# Patient Record
Sex: MALE | Race: WHITE | Employment: FULL TIME | URBAN - METROPOLITAN AREA
[De-identification: names, ages, dates, MRNs, and addresses within clinical notes are randomized per-mention and may not be internally consistent; named-entity substitution may affect disease eponyms.]

---

## 2019-01-19 ENCOUNTER — HOSPITAL ENCOUNTER (INPATIENT)
Age: 49
LOS: 3 days | Discharge: HOME | DRG: 661 | End: 2019-01-22
Attending: FAMILY MEDICINE | Admitting: INTERNAL MEDICINE
Payer: COMMERCIAL

## 2019-01-19 ENCOUNTER — APPOINTMENT (OUTPATIENT)
Dept: CT IMAGING | Age: 49
DRG: 661 | End: 2019-01-19
Payer: COMMERCIAL

## 2019-01-19 DIAGNOSIS — N20.0 KIDNEY STONE: Primary | ICD-10-CM

## 2019-01-19 DIAGNOSIS — N20.1 URETERAL CALCULUS, LEFT: ICD-10-CM

## 2019-01-19 PROBLEM — N17.9 AKI (ACUTE KIDNEY INJURY) (HCC): Status: ACTIVE | Noted: 2019-01-19

## 2019-01-19 PROBLEM — E78.5 HYPERLIPIDEMIA: Status: ACTIVE | Noted: 2019-01-19

## 2019-01-19 PROBLEM — N13.2 HYDRONEPHROSIS WITH URINARY OBSTRUCTION DUE TO URETERAL CALCULUS: Status: ACTIVE | Noted: 2019-01-19

## 2019-01-19 PROBLEM — I10 ESSENTIAL HYPERTENSION: Status: ACTIVE | Noted: 2019-01-19

## 2019-01-19 PROBLEM — N13.30 HYDRONEPHROSIS: Status: ACTIVE | Noted: 2019-01-19

## 2019-01-19 PROBLEM — E11.9 TYPE 2 DIABETES MELLITUS WITHOUT COMPLICATION, WITHOUT LONG-TERM CURRENT USE OF INSULIN (HCC): Status: ACTIVE | Noted: 2019-01-19

## 2019-01-19 LAB
ALBUMIN SERPL-MCNC: 3.8 G/DL (ref 3.5–5.2)
ALBUMIN/GLOBULIN RATIO: ABNORMAL (ref 1–2.5)
ALP BLD-CCNC: 80 U/L (ref 40–129)
ALT SERPL-CCNC: 14 U/L (ref 5–41)
ANION GAP SERPL CALCULATED.3IONS-SCNC: 15 MMOL/L (ref 9–17)
AST SERPL-CCNC: 10 U/L
BILIRUB SERPL-MCNC: 0.69 MG/DL (ref 0.3–1.2)
BUN BLDV-MCNC: 18 MG/DL (ref 6–20)
BUN/CREAT BLD: 11 (ref 9–20)
C-REACTIVE PROTEIN: 33.7 MG/L (ref 0–5)
CALCIUM SERPL-MCNC: 8.6 MG/DL (ref 8.6–10.4)
CHLORIDE BLD-SCNC: 101 MMOL/L (ref 98–107)
CHP ED QC CHECK: NORMAL
CO2: 24 MMOL/L (ref 20–31)
CREAT SERPL-MCNC: 1.62 MG/DL (ref 0.7–1.2)
GFR AFRICAN AMERICAN: 56 ML/MIN
GFR NON-AFRICAN AMERICAN: 46 ML/MIN
GFR SERPL CREATININE-BSD FRML MDRD: ABNORMAL ML/MIN/{1.73_M2}
GFR SERPL CREATININE-BSD FRML MDRD: ABNORMAL ML/MIN/{1.73_M2}
GLUCOSE BLD-MCNC: 168 MG/DL (ref 75–110)
GLUCOSE BLD-MCNC: 188 MG/DL (ref 70–99)
HCT VFR BLD CALC: 42.6 % (ref 41–53)
HEMOGLOBIN: 14.6 G/DL (ref 13.5–17.5)
MCH RBC QN AUTO: 28.4 PG (ref 26–34)
MCHC RBC AUTO-ENTMCNC: 34.3 G/DL (ref 31–37)
MCV RBC AUTO: 82.6 FL (ref 80–100)
NRBC AUTOMATED: NORMAL PER 100 WBC
PDW BLD-RTO: 14.1 % (ref 11.5–14.5)
PLATELET # BLD: 194 K/UL (ref 130–400)
PMV BLD AUTO: 7.6 FL (ref 6–12)
POTASSIUM SERPL-SCNC: 4.1 MMOL/L (ref 3.7–5.3)
RBC # BLD: 5.16 M/UL (ref 4.5–5.9)
SODIUM BLD-SCNC: 140 MMOL/L (ref 135–144)
TOTAL PROTEIN: 6.7 G/DL (ref 6.4–8.3)
WBC # BLD: 8.8 K/UL (ref 3.5–11)

## 2019-01-19 PROCEDURE — 6360000002 HC RX W HCPCS: Performed by: FAMILY MEDICINE

## 2019-01-19 PROCEDURE — 2580000003 HC RX 258: Performed by: INTERNAL MEDICINE

## 2019-01-19 PROCEDURE — 87086 URINE CULTURE/COLONY COUNT: CPT

## 2019-01-19 PROCEDURE — 82947 ASSAY GLUCOSE BLOOD QUANT: CPT

## 2019-01-19 PROCEDURE — 99222 1ST HOSP IP/OBS MODERATE 55: CPT | Performed by: INTERNAL MEDICINE

## 2019-01-19 PROCEDURE — 2580000003 HC RX 258: Performed by: FAMILY MEDICINE

## 2019-01-19 PROCEDURE — 1200000000 HC SEMI PRIVATE

## 2019-01-19 PROCEDURE — 99285 EMERGENCY DEPT VISIT HI MDM: CPT

## 2019-01-19 PROCEDURE — 36415 COLL VENOUS BLD VENIPUNCTURE: CPT

## 2019-01-19 PROCEDURE — 86140 C-REACTIVE PROTEIN: CPT

## 2019-01-19 PROCEDURE — 80053 COMPREHEN METABOLIC PANEL: CPT

## 2019-01-19 PROCEDURE — 85027 COMPLETE CBC AUTOMATED: CPT

## 2019-01-19 PROCEDURE — 74176 CT ABD & PELVIS W/O CONTRAST: CPT

## 2019-01-19 PROCEDURE — 6370000000 HC RX 637 (ALT 250 FOR IP): Performed by: INTERNAL MEDICINE

## 2019-01-19 PROCEDURE — 96360 HYDRATION IV INFUSION INIT: CPT

## 2019-01-19 RX ORDER — ONDANSETRON 2 MG/ML
4 INJECTION INTRAMUSCULAR; INTRAVENOUS EVERY 6 HOURS PRN
Status: DISCONTINUED | OUTPATIENT
Start: 2019-01-19 | End: 2019-01-19 | Stop reason: CLARIF

## 2019-01-19 RX ORDER — BISACODYL 10 MG
10 SUPPOSITORY, RECTAL RECTAL DAILY PRN
Status: DISCONTINUED | OUTPATIENT
Start: 2019-01-19 | End: 2019-01-22 | Stop reason: HOSPADM

## 2019-01-19 RX ORDER — POTASSIUM CHLORIDE 20 MEQ/1
40 TABLET, EXTENDED RELEASE ORAL PRN
Status: DISCONTINUED | OUTPATIENT
Start: 2019-01-19 | End: 2019-01-22 | Stop reason: HOSPADM

## 2019-01-19 RX ORDER — ONDANSETRON 4 MG/1
4 TABLET, ORALLY DISINTEGRATING ORAL EVERY 6 HOURS PRN
Status: DISCONTINUED | OUTPATIENT
Start: 2019-01-19 | End: 2019-01-22 | Stop reason: HOSPADM

## 2019-01-19 RX ORDER — SIMVASTATIN 20 MG
20 TABLET ORAL NIGHTLY
Status: DISCONTINUED | OUTPATIENT
Start: 2019-01-19 | End: 2019-01-22 | Stop reason: HOSPADM

## 2019-01-19 RX ORDER — CIPROFLOXACIN 2 MG/ML
400 INJECTION, SOLUTION INTRAVENOUS ONCE
Status: COMPLETED | OUTPATIENT
Start: 2019-01-19 | End: 2019-01-19

## 2019-01-19 RX ORDER — 0.9 % SODIUM CHLORIDE 0.9 %
1000 INTRAVENOUS SOLUTION INTRAVENOUS ONCE
Status: COMPLETED | OUTPATIENT
Start: 2019-01-19 | End: 2019-01-19

## 2019-01-19 RX ORDER — CIPROFLOXACIN 500 MG/1
500 TABLET, FILM COATED ORAL EVERY 12 HOURS SCHEDULED
Status: DISCONTINUED | OUTPATIENT
Start: 2019-01-19 | End: 2019-01-22 | Stop reason: HOSPADM

## 2019-01-19 RX ORDER — ONDANSETRON 2 MG/ML
4 INJECTION INTRAMUSCULAR; INTRAVENOUS EVERY 6 HOURS PRN
Status: DISCONTINUED | OUTPATIENT
Start: 2019-01-19 | End: 2019-01-22 | Stop reason: HOSPADM

## 2019-01-19 RX ORDER — SODIUM CHLORIDE 0.9 % (FLUSH) 0.9 %
10 SYRINGE (ML) INJECTION PRN
Status: DISCONTINUED | OUTPATIENT
Start: 2019-01-19 | End: 2019-01-22 | Stop reason: HOSPADM

## 2019-01-19 RX ORDER — NICOTINE POLACRILEX 4 MG
15 LOZENGE BUCCAL PRN
Status: DISCONTINUED | OUTPATIENT
Start: 2019-01-19 | End: 2019-01-22 | Stop reason: HOSPADM

## 2019-01-19 RX ORDER — POTASSIUM CHLORIDE 7.45 MG/ML
10 INJECTION INTRAVENOUS PRN
Status: DISCONTINUED | OUTPATIENT
Start: 2019-01-19 | End: 2019-01-22 | Stop reason: HOSPADM

## 2019-01-19 RX ORDER — MAGNESIUM SULFATE 1 G/100ML
1 INJECTION INTRAVENOUS PRN
Status: DISCONTINUED | OUTPATIENT
Start: 2019-01-19 | End: 2019-01-22 | Stop reason: HOSPADM

## 2019-01-19 RX ORDER — CARVEDILOL 6.25 MG/1
6.25 TABLET ORAL 2 TIMES DAILY WITH MEALS
Status: DISCONTINUED | OUTPATIENT
Start: 2019-01-19 | End: 2019-01-22 | Stop reason: HOSPADM

## 2019-01-19 RX ORDER — POTASSIUM CHLORIDE 20MEQ/15ML
40 LIQUID (ML) ORAL PRN
Status: DISCONTINUED | OUTPATIENT
Start: 2019-01-19 | End: 2019-01-22 | Stop reason: HOSPADM

## 2019-01-19 RX ORDER — HYDROCODONE BITARTRATE AND ACETAMINOPHEN 5; 325 MG/1; MG/1
1 TABLET ORAL EVERY 4 HOURS PRN
Status: DISCONTINUED | OUTPATIENT
Start: 2019-01-19 | End: 2019-01-22 | Stop reason: HOSPADM

## 2019-01-19 RX ORDER — DEXTROSE MONOHYDRATE 50 MG/ML
100 INJECTION, SOLUTION INTRAVENOUS PRN
Status: DISCONTINUED | OUTPATIENT
Start: 2019-01-19 | End: 2019-01-22 | Stop reason: HOSPADM

## 2019-01-19 RX ORDER — DEXTROSE MONOHYDRATE 25 G/50ML
12.5 INJECTION, SOLUTION INTRAVENOUS PRN
Status: DISCONTINUED | OUTPATIENT
Start: 2019-01-19 | End: 2019-01-22 | Stop reason: HOSPADM

## 2019-01-19 RX ORDER — SODIUM CHLORIDE 9 MG/ML
INJECTION, SOLUTION INTRAVENOUS CONTINUOUS
Status: DISCONTINUED | OUTPATIENT
Start: 2019-01-19 | End: 2019-01-22 | Stop reason: HOSPADM

## 2019-01-19 RX ORDER — SODIUM CHLORIDE 0.9 % (FLUSH) 0.9 %
10 SYRINGE (ML) INJECTION EVERY 12 HOURS SCHEDULED
Status: DISCONTINUED | OUTPATIENT
Start: 2019-01-19 | End: 2019-01-22 | Stop reason: HOSPADM

## 2019-01-19 RX ORDER — ALLOPURINOL 300 MG/1
300 TABLET ORAL DAILY
Status: DISCONTINUED | OUTPATIENT
Start: 2019-01-19 | End: 2019-01-22 | Stop reason: HOSPADM

## 2019-01-19 RX ADMIN — SODIUM CHLORIDE: 9 INJECTION, SOLUTION INTRAVENOUS at 18:18

## 2019-01-19 RX ADMIN — SODIUM CHLORIDE 1000 ML: 9 INJECTION, SOLUTION INTRAVENOUS at 14:04

## 2019-01-19 RX ADMIN — CIPROFLOXACIN 400 MG: 2 INJECTION, SOLUTION INTRAVENOUS at 15:00

## 2019-01-19 RX ADMIN — CARVEDILOL 6.25 MG: 6.25 TABLET, FILM COATED ORAL at 18:18

## 2019-01-19 RX ADMIN — ALLOPURINOL 300 MG: 300 TABLET ORAL at 18:18

## 2019-01-19 RX ADMIN — CIPROFLOXACIN 500 MG: 500 TABLET ORAL at 20:38

## 2019-01-19 ASSESSMENT — ENCOUNTER SYMPTOMS
EYES NEGATIVE: 1
ALLERGIC/IMMUNOLOGIC NEGATIVE: 1
GASTROINTESTINAL NEGATIVE: 1
RESPIRATORY NEGATIVE: 1

## 2019-01-19 ASSESSMENT — PAIN SCALES - GENERAL: PAINLEVEL_OUTOF10: 8

## 2019-01-19 ASSESSMENT — PAIN DESCRIPTION - LOCATION: LOCATION: ABDOMEN

## 2019-01-19 ASSESSMENT — PAIN DESCRIPTION - PROGRESSION: CLINICAL_PROGRESSION: NOT CHANGED

## 2019-01-19 ASSESSMENT — PAIN DESCRIPTION - DESCRIPTORS: DESCRIPTORS: ACHING;DISCOMFORT

## 2019-01-19 ASSESSMENT — PAIN DESCRIPTION - PAIN TYPE: TYPE: ACUTE PAIN

## 2019-01-19 ASSESSMENT — PAIN DESCRIPTION - FREQUENCY: FREQUENCY: CONTINUOUS

## 2019-01-19 ASSESSMENT — PAIN DESCRIPTION - ONSET: ONSET: ON-GOING

## 2019-01-19 ASSESSMENT — PAIN DESCRIPTION - ORIENTATION: ORIENTATION: LEFT;LOWER

## 2019-01-20 ENCOUNTER — ANESTHESIA (OUTPATIENT)
Dept: OPERATING ROOM | Age: 49
DRG: 661 | End: 2019-01-20
Payer: COMMERCIAL

## 2019-01-20 ENCOUNTER — APPOINTMENT (OUTPATIENT)
Dept: GENERAL RADIOLOGY | Age: 49
DRG: 661 | End: 2019-01-20
Payer: COMMERCIAL

## 2019-01-20 ENCOUNTER — ANESTHESIA EVENT (OUTPATIENT)
Dept: OPERATING ROOM | Age: 49
DRG: 661 | End: 2019-01-20
Payer: COMMERCIAL

## 2019-01-20 VITALS — OXYGEN SATURATION: 95 % | SYSTOLIC BLOOD PRESSURE: 135 MMHG | DIASTOLIC BLOOD PRESSURE: 79 MMHG

## 2019-01-20 PROBLEM — N20.1 URETERAL CALCULUS, LEFT: Status: ACTIVE | Noted: 2019-01-20

## 2019-01-20 LAB
ANION GAP SERPL CALCULATED.3IONS-SCNC: 14 MMOL/L (ref 9–17)
BUN BLDV-MCNC: 25 MG/DL (ref 6–20)
BUN/CREAT BLD: 7 (ref 9–20)
CALCIUM SERPL-MCNC: 8.3 MG/DL (ref 8.6–10.4)
CHLORIDE BLD-SCNC: 102 MMOL/L (ref 98–107)
CO2: 24 MMOL/L (ref 20–31)
CREAT SERPL-MCNC: 3.57 MG/DL (ref 0.7–1.2)
CULTURE: NO GROWTH
GFR AFRICAN AMERICAN: 22 ML/MIN
GFR NON-AFRICAN AMERICAN: 18 ML/MIN
GFR SERPL CREATININE-BSD FRML MDRD: ABNORMAL ML/MIN/{1.73_M2}
GFR SERPL CREATININE-BSD FRML MDRD: ABNORMAL ML/MIN/{1.73_M2}
GLUCOSE BLD-MCNC: 163 MG/DL (ref 75–110)
GLUCOSE BLD-MCNC: 175 MG/DL (ref 75–110)
GLUCOSE BLD-MCNC: 182 MG/DL (ref 70–99)
GLUCOSE BLD-MCNC: 195 MG/DL (ref 75–110)
GLUCOSE BLD-MCNC: 201 MG/DL (ref 75–110)
GLUCOSE BLD-MCNC: 208 MG/DL (ref 75–110)
HCT VFR BLD CALC: 39.8 % (ref 41–53)
HEMOGLOBIN: 13.5 G/DL (ref 13.5–17.5)
INR BLD: 1.1
Lab: NORMAL
MCH RBC QN AUTO: 28.3 PG (ref 26–34)
MCHC RBC AUTO-ENTMCNC: 33.9 G/DL (ref 31–37)
MCV RBC AUTO: 83.6 FL (ref 80–100)
NRBC AUTOMATED: ABNORMAL PER 100 WBC
PDW BLD-RTO: 14.4 % (ref 11.5–14.5)
PLATELET # BLD: 179 K/UL (ref 130–400)
PMV BLD AUTO: 7.4 FL (ref 6–12)
POTASSIUM SERPL-SCNC: 4.2 MMOL/L (ref 3.7–5.3)
PROTHROMBIN TIME: 11 SEC (ref 9.7–11.6)
RBC # BLD: 4.77 M/UL (ref 4.5–5.9)
SODIUM BLD-SCNC: 140 MMOL/L (ref 135–144)
SPECIMEN DESCRIPTION: NORMAL
STATUS: NORMAL
WBC # BLD: 8.7 K/UL (ref 3.5–11)

## 2019-01-20 PROCEDURE — 99232 SBSQ HOSP IP/OBS MODERATE 35: CPT | Performed by: INTERNAL MEDICINE

## 2019-01-20 PROCEDURE — 3600000012 HC SURGERY LEVEL 2 ADDTL 15MIN: Performed by: UROLOGY

## 2019-01-20 PROCEDURE — C1769 GUIDE WIRE: HCPCS | Performed by: UROLOGY

## 2019-01-20 PROCEDURE — C2617 STENT, NON-COR, TEM W/O DEL: HCPCS | Performed by: UROLOGY

## 2019-01-20 PROCEDURE — 85027 COMPLETE CBC AUTOMATED: CPT

## 2019-01-20 PROCEDURE — 82947 ASSAY GLUCOSE BLOOD QUANT: CPT

## 2019-01-20 PROCEDURE — 0T778DZ DILATION OF LEFT URETER WITH INTRALUMINAL DEVICE, VIA NATURAL OR ARTIFICIAL OPENING ENDOSCOPIC: ICD-10-PCS | Performed by: UROLOGY

## 2019-01-20 PROCEDURE — 74420 UROGRAPHY RTRGR +-KUB: CPT

## 2019-01-20 PROCEDURE — 7100000001 HC PACU RECOVERY - ADDTL 15 MIN: Performed by: UROLOGY

## 2019-01-20 PROCEDURE — 2709999900 HC NON-CHARGEABLE SUPPLY: Performed by: UROLOGY

## 2019-01-20 PROCEDURE — 80048 BASIC METABOLIC PNL TOTAL CA: CPT

## 2019-01-20 PROCEDURE — 1200000000 HC SEMI PRIVATE

## 2019-01-20 PROCEDURE — 3700000001 HC ADD 15 MINUTES (ANESTHESIA): Performed by: UROLOGY

## 2019-01-20 PROCEDURE — BT1FZZZ FLUOROSCOPY OF LEFT KIDNEY, URETER AND BLADDER: ICD-10-PCS | Performed by: UROLOGY

## 2019-01-20 PROCEDURE — 3600000002 HC SURGERY LEVEL 2 BASE: Performed by: UROLOGY

## 2019-01-20 PROCEDURE — 6370000000 HC RX 637 (ALT 250 FOR IP): Performed by: INTERNAL MEDICINE

## 2019-01-20 PROCEDURE — 3700000000 HC ANESTHESIA ATTENDED CARE: Performed by: UROLOGY

## 2019-01-20 PROCEDURE — 6360000004 HC RX CONTRAST MEDICATION: Performed by: UROLOGY

## 2019-01-20 PROCEDURE — 6360000002 HC RX W HCPCS: Performed by: ANESTHESIOLOGY

## 2019-01-20 PROCEDURE — 6360000002 HC RX W HCPCS: Performed by: INTERNAL MEDICINE

## 2019-01-20 PROCEDURE — 36415 COLL VENOUS BLD VENIPUNCTURE: CPT

## 2019-01-20 PROCEDURE — 2580000003 HC RX 258: Performed by: INTERNAL MEDICINE

## 2019-01-20 PROCEDURE — 3209999900 FLUORO FOR SURGICAL PROCEDURES

## 2019-01-20 PROCEDURE — 85610 PROTHROMBIN TIME: CPT

## 2019-01-20 PROCEDURE — 2500000003 HC RX 250 WO HCPCS: Performed by: ANESTHESIOLOGY

## 2019-01-20 PROCEDURE — C1758 CATHETER, URETERAL: HCPCS | Performed by: UROLOGY

## 2019-01-20 PROCEDURE — 7100000000 HC PACU RECOVERY - FIRST 15 MIN: Performed by: UROLOGY

## 2019-01-20 DEVICE — URETERAL STENT
Type: IMPLANTABLE DEVICE | Site: URETER | Status: FUNCTIONAL
Brand: POLARIS™ ULTRA

## 2019-01-20 RX ORDER — FENTANYL CITRATE 50 UG/ML
50 INJECTION, SOLUTION INTRAMUSCULAR; INTRAVENOUS EVERY 5 MIN PRN
Status: DISCONTINUED | OUTPATIENT
Start: 2019-01-20 | End: 2019-01-20 | Stop reason: HOSPADM

## 2019-01-20 RX ORDER — PROPOFOL 10 MG/ML
INJECTION, EMULSION INTRAVENOUS PRN
Status: DISCONTINUED | OUTPATIENT
Start: 2019-01-20 | End: 2019-01-20 | Stop reason: SDUPTHER

## 2019-01-20 RX ORDER — ONDANSETRON 2 MG/ML
INJECTION INTRAMUSCULAR; INTRAVENOUS PRN
Status: DISCONTINUED | OUTPATIENT
Start: 2019-01-20 | End: 2019-01-20 | Stop reason: SDUPTHER

## 2019-01-20 RX ORDER — ONDANSETRON 2 MG/ML
4 INJECTION INTRAMUSCULAR; INTRAVENOUS
Status: DISCONTINUED | OUTPATIENT
Start: 2019-01-20 | End: 2019-01-20 | Stop reason: HOSPADM

## 2019-01-20 RX ORDER — SUCCINYLCHOLINE CHLORIDE 20 MG/ML
INJECTION INTRAMUSCULAR; INTRAVENOUS PRN
Status: DISCONTINUED | OUTPATIENT
Start: 2019-01-20 | End: 2019-01-20 | Stop reason: SDUPTHER

## 2019-01-20 RX ORDER — LIDOCAINE HYDROCHLORIDE 20 MG/ML
INJECTION, SOLUTION INFILTRATION; PERINEURAL PRN
Status: DISCONTINUED | OUTPATIENT
Start: 2019-01-20 | End: 2019-01-20 | Stop reason: SDUPTHER

## 2019-01-20 RX ORDER — FENTANYL CITRATE 50 UG/ML
INJECTION, SOLUTION INTRAMUSCULAR; INTRAVENOUS PRN
Status: DISCONTINUED | OUTPATIENT
Start: 2019-01-20 | End: 2019-01-20 | Stop reason: SDUPTHER

## 2019-01-20 RX ORDER — FENTANYL CITRATE 50 UG/ML
25 INJECTION, SOLUTION INTRAMUSCULAR; INTRAVENOUS EVERY 5 MIN PRN
Status: DISCONTINUED | OUTPATIENT
Start: 2019-01-20 | End: 2019-01-20 | Stop reason: HOSPADM

## 2019-01-20 RX ADMIN — PROPOFOL 100 MG: 10 INJECTION, EMULSION INTRAVENOUS at 10:24

## 2019-01-20 RX ADMIN — CIPROFLOXACIN 500 MG: 500 TABLET ORAL at 21:20

## 2019-01-20 RX ADMIN — SODIUM CHLORIDE: 9 INJECTION, SOLUTION INTRAVENOUS at 15:41

## 2019-01-20 RX ADMIN — INSULIN LISPRO 4 UNITS: 100 INJECTION, SOLUTION INTRAVENOUS; SUBCUTANEOUS at 17:22

## 2019-01-20 RX ADMIN — ONDANSETRON 4 MG: 2 INJECTION, SOLUTION INTRAMUSCULAR; INTRAVENOUS at 10:16

## 2019-01-20 RX ADMIN — PROPOFOL 200 MG: 10 INJECTION, EMULSION INTRAVENOUS at 10:11

## 2019-01-20 RX ADMIN — ONDANSETRON 4 MG: 2 INJECTION INTRAMUSCULAR; INTRAVENOUS at 07:41

## 2019-01-20 RX ADMIN — SUCCINYLCHOLINE CHLORIDE 100 MG: 20 INJECTION, SOLUTION INTRAMUSCULAR; INTRAVENOUS at 10:11

## 2019-01-20 RX ADMIN — INSULIN LISPRO 2 UNITS: 100 INJECTION, SOLUTION INTRAVENOUS; SUBCUTANEOUS at 21:27

## 2019-01-20 RX ADMIN — CARVEDILOL 6.25 MG: 6.25 TABLET, FILM COATED ORAL at 15:41

## 2019-01-20 RX ADMIN — FENTANYL CITRATE 100 MCG: 50 INJECTION, SOLUTION INTRAMUSCULAR; INTRAVENOUS at 10:11

## 2019-01-20 RX ADMIN — CIPROFLOXACIN 500 MG: 500 TABLET ORAL at 07:58

## 2019-01-20 RX ADMIN — LIDOCAINE HYDROCHLORIDE 5 ML: 20 INJECTION, SOLUTION INFILTRATION; PERINEURAL at 10:11

## 2019-01-20 RX ADMIN — SIMVASTATIN 20 MG: 20 TABLET, FILM COATED ORAL at 21:20

## 2019-01-20 ASSESSMENT — PULMONARY FUNCTION TESTS
PIF_VALUE: 36
PIF_VALUE: 0
PIF_VALUE: 26
PIF_VALUE: 25
PIF_VALUE: 35
PIF_VALUE: 0
PIF_VALUE: 29
PIF_VALUE: 0
PIF_VALUE: 29
PIF_VALUE: 2
PIF_VALUE: 36
PIF_VALUE: 40
PIF_VALUE: 1
PIF_VALUE: 27
PIF_VALUE: 25
PIF_VALUE: -1
PIF_VALUE: 27
PIF_VALUE: 40
PIF_VALUE: 26
PIF_VALUE: 27
PIF_VALUE: 2
PIF_VALUE: 25
PIF_VALUE: 27
PIF_VALUE: 0
PIF_VALUE: 37
PIF_VALUE: 26
PIF_VALUE: 26
PIF_VALUE: 1
PIF_VALUE: 25
PIF_VALUE: -2

## 2019-01-20 ASSESSMENT — PAIN DESCRIPTION - PAIN TYPE: TYPE: SURGICAL PAIN

## 2019-01-20 ASSESSMENT — PAIN DESCRIPTION - DESCRIPTORS: DESCRIPTORS: ACHING;DISCOMFORT

## 2019-01-20 ASSESSMENT — PAIN DESCRIPTION - LOCATION: LOCATION: ABDOMEN

## 2019-01-20 ASSESSMENT — PAIN DESCRIPTION - PROGRESSION: CLINICAL_PROGRESSION: NOT CHANGED

## 2019-01-20 ASSESSMENT — PAIN DESCRIPTION - FREQUENCY: FREQUENCY: CONTINUOUS

## 2019-01-20 ASSESSMENT — PAIN DESCRIPTION - ONSET: ONSET: ON-GOING

## 2019-01-20 ASSESSMENT — PAIN SCALES - GENERAL
PAINLEVEL_OUTOF10: 0
PAINLEVEL_OUTOF10: 0
PAINLEVEL_OUTOF10: 1
PAINLEVEL_OUTOF10: 0
PAINLEVEL_OUTOF10: 0

## 2019-01-20 ASSESSMENT — PAIN DESCRIPTION - ORIENTATION: ORIENTATION: LEFT;LOWER

## 2019-01-21 LAB
ANION GAP SERPL CALCULATED.3IONS-SCNC: 13 MMOL/L (ref 9–17)
BUN BLDV-MCNC: 22 MG/DL (ref 6–20)
BUN/CREAT BLD: 9 (ref 9–20)
CALCIUM SERPL-MCNC: 8.7 MG/DL (ref 8.6–10.4)
CHLORIDE BLD-SCNC: 106 MMOL/L (ref 98–107)
CO2: 25 MMOL/L (ref 20–31)
CREAT SERPL-MCNC: 2.35 MG/DL (ref 0.7–1.2)
GFR AFRICAN AMERICAN: 36 ML/MIN
GFR NON-AFRICAN AMERICAN: 30 ML/MIN
GFR SERPL CREATININE-BSD FRML MDRD: ABNORMAL ML/MIN/{1.73_M2}
GFR SERPL CREATININE-BSD FRML MDRD: ABNORMAL ML/MIN/{1.73_M2}
GLUCOSE BLD-MCNC: 168 MG/DL (ref 75–110)
GLUCOSE BLD-MCNC: 174 MG/DL (ref 75–110)
GLUCOSE BLD-MCNC: 177 MG/DL (ref 70–99)
GLUCOSE BLD-MCNC: 200 MG/DL (ref 75–110)
GLUCOSE BLD-MCNC: 220 MG/DL (ref 75–110)
POTASSIUM SERPL-SCNC: 4.2 MMOL/L (ref 3.7–5.3)
SODIUM BLD-SCNC: 144 MMOL/L (ref 135–144)

## 2019-01-21 PROCEDURE — 6370000000 HC RX 637 (ALT 250 FOR IP): Performed by: INTERNAL MEDICINE

## 2019-01-21 PROCEDURE — 83036 HEMOGLOBIN GLYCOSYLATED A1C: CPT

## 2019-01-21 PROCEDURE — 36415 COLL VENOUS BLD VENIPUNCTURE: CPT

## 2019-01-21 PROCEDURE — 99232 SBSQ HOSP IP/OBS MODERATE 35: CPT | Performed by: INTERNAL MEDICINE

## 2019-01-21 PROCEDURE — 80048 BASIC METABOLIC PNL TOTAL CA: CPT

## 2019-01-21 PROCEDURE — 1200000000 HC SEMI PRIVATE

## 2019-01-21 PROCEDURE — 82947 ASSAY GLUCOSE BLOOD QUANT: CPT

## 2019-01-21 PROCEDURE — 2580000003 HC RX 258: Performed by: INTERNAL MEDICINE

## 2019-01-21 RX ORDER — GLIPIZIDE 10 MG/1
10 TABLET, FILM COATED, EXTENDED RELEASE ORAL 2 TIMES DAILY
COMMUNITY

## 2019-01-21 RX ADMIN — CARVEDILOL 6.25 MG: 6.25 TABLET, FILM COATED ORAL at 08:44

## 2019-01-21 RX ADMIN — CIPROFLOXACIN 500 MG: 500 TABLET ORAL at 08:44

## 2019-01-21 RX ADMIN — INSULIN LISPRO 3 UNITS: 100 INJECTION, SOLUTION INTRAVENOUS; SUBCUTANEOUS at 22:17

## 2019-01-21 RX ADMIN — SIMVASTATIN 20 MG: 20 TABLET, FILM COATED ORAL at 22:17

## 2019-01-21 RX ADMIN — CARVEDILOL 6.25 MG: 6.25 TABLET, FILM COATED ORAL at 17:03

## 2019-01-21 RX ADMIN — INSULIN LISPRO 3 UNITS: 100 INJECTION, SOLUTION INTRAVENOUS; SUBCUTANEOUS at 17:02

## 2019-01-21 RX ADMIN — ALLOPURINOL 300 MG: 300 TABLET ORAL at 08:44

## 2019-01-21 RX ADMIN — SODIUM CHLORIDE: 9 INJECTION, SOLUTION INTRAVENOUS at 16:26

## 2019-01-21 RX ADMIN — INSULIN LISPRO 2 UNITS: 100 INJECTION, SOLUTION INTRAVENOUS; SUBCUTANEOUS at 08:45

## 2019-01-21 RX ADMIN — CIPROFLOXACIN 500 MG: 500 TABLET ORAL at 22:17

## 2019-01-21 RX ADMIN — INSULIN LISPRO 4 UNITS: 100 INJECTION, SOLUTION INTRAVENOUS; SUBCUTANEOUS at 11:42

## 2019-01-21 ASSESSMENT — PAIN DESCRIPTION - PROGRESSION

## 2019-01-21 ASSESSMENT — PAIN SCALES - GENERAL: PAINLEVEL_OUTOF10: 1

## 2019-01-21 ASSESSMENT — PAIN DESCRIPTION - ONSET: ONSET: ON-GOING

## 2019-01-21 ASSESSMENT — PAIN DESCRIPTION - DESCRIPTORS: DESCRIPTORS: ACHING;DISCOMFORT

## 2019-01-21 ASSESSMENT — PAIN DESCRIPTION - PAIN TYPE: TYPE: SURGICAL PAIN

## 2019-01-21 ASSESSMENT — PAIN DESCRIPTION - FREQUENCY: FREQUENCY: CONTINUOUS

## 2019-01-21 ASSESSMENT — PAIN DESCRIPTION - LOCATION: LOCATION: ABDOMEN

## 2019-01-21 ASSESSMENT — PAIN DESCRIPTION - ORIENTATION: ORIENTATION: LEFT;LOWER

## 2019-01-22 ENCOUNTER — APPOINTMENT (OUTPATIENT)
Dept: GENERAL RADIOLOGY | Age: 49
DRG: 661 | End: 2019-01-22
Payer: COMMERCIAL

## 2019-01-22 VITALS
BODY MASS INDEX: 38.04 KG/M2 | HEIGHT: 70 IN | RESPIRATION RATE: 16 BRPM | SYSTOLIC BLOOD PRESSURE: 158 MMHG | HEART RATE: 78 BPM | TEMPERATURE: 97.5 F | WEIGHT: 265.7 LBS | OXYGEN SATURATION: 91 % | DIASTOLIC BLOOD PRESSURE: 84 MMHG

## 2019-01-22 LAB
ANION GAP SERPL CALCULATED.3IONS-SCNC: 15 MMOL/L (ref 9–17)
BUN BLDV-MCNC: 16 MG/DL (ref 6–20)
BUN/CREAT BLD: 10 (ref 9–20)
CALCIUM SERPL-MCNC: 8.7 MG/DL (ref 8.6–10.4)
CHLORIDE BLD-SCNC: 102 MMOL/L (ref 98–107)
CO2: 25 MMOL/L (ref 20–31)
CREAT SERPL-MCNC: 1.54 MG/DL (ref 0.7–1.2)
ESTIMATED AVERAGE GLUCOSE: 214 MG/DL
GFR AFRICAN AMERICAN: 59 ML/MIN
GFR NON-AFRICAN AMERICAN: 49 ML/MIN
GFR SERPL CREATININE-BSD FRML MDRD: ABNORMAL ML/MIN/{1.73_M2}
GFR SERPL CREATININE-BSD FRML MDRD: ABNORMAL ML/MIN/{1.73_M2}
GLUCOSE BLD-MCNC: 172 MG/DL (ref 75–110)
GLUCOSE BLD-MCNC: 182 MG/DL (ref 75–110)
GLUCOSE BLD-MCNC: 184 MG/DL (ref 70–99)
GLUCOSE BLD-MCNC: 240 MG/DL (ref 75–110)
HBA1C MFR BLD: 9.1 % (ref 4–6)
POTASSIUM SERPL-SCNC: 4 MMOL/L (ref 3.7–5.3)
SODIUM BLD-SCNC: 142 MMOL/L (ref 135–144)

## 2019-01-22 PROCEDURE — 6370000000 HC RX 637 (ALT 250 FOR IP): Performed by: UROLOGY

## 2019-01-22 PROCEDURE — 36415 COLL VENOUS BLD VENIPUNCTURE: CPT

## 2019-01-22 PROCEDURE — 99232 SBSQ HOSP IP/OBS MODERATE 35: CPT | Performed by: INTERNAL MEDICINE

## 2019-01-22 PROCEDURE — 6370000000 HC RX 637 (ALT 250 FOR IP): Performed by: INTERNAL MEDICINE

## 2019-01-22 PROCEDURE — 74018 RADEX ABDOMEN 1 VIEW: CPT

## 2019-01-22 PROCEDURE — 2580000003 HC RX 258: Performed by: INTERNAL MEDICINE

## 2019-01-22 PROCEDURE — 80048 BASIC METABOLIC PNL TOTAL CA: CPT

## 2019-01-22 PROCEDURE — 51798 US URINE CAPACITY MEASURE: CPT

## 2019-01-22 PROCEDURE — 82947 ASSAY GLUCOSE BLOOD QUANT: CPT

## 2019-01-22 RX ORDER — CIPROFLOXACIN 500 MG/1
500 TABLET, FILM COATED ORAL EVERY 12 HOURS SCHEDULED
Qty: 10 TABLET | Refills: 0 | Status: SHIPPED | OUTPATIENT
Start: 2019-01-22 | End: 2019-01-27

## 2019-01-22 RX ORDER — HYDROCODONE BITARTRATE AND ACETAMINOPHEN 5; 325 MG/1; MG/1
1 TABLET ORAL EVERY 6 HOURS PRN
Qty: 12 TABLET | Refills: 0 | Status: SHIPPED | OUTPATIENT
Start: 2019-01-22 | End: 2019-01-25

## 2019-01-22 RX ORDER — BISACODYL 10 MG
10 SUPPOSITORY, RECTAL RECTAL DAILY PRN
Status: DISCONTINUED | OUTPATIENT
Start: 2019-01-22 | End: 2019-01-22 | Stop reason: SDUPTHER

## 2019-01-22 RX ADMIN — INSULIN LISPRO 3 UNITS: 100 INJECTION, SOLUTION INTRAVENOUS; SUBCUTANEOUS at 08:19

## 2019-01-22 RX ADMIN — INSULIN LISPRO 6 UNITS: 100 INJECTION, SOLUTION INTRAVENOUS; SUBCUTANEOUS at 13:14

## 2019-01-22 RX ADMIN — HYDROCODONE BITARTRATE AND ACETAMINOPHEN 1 TABLET: 5; 325 TABLET ORAL at 08:19

## 2019-01-22 RX ADMIN — HYDROCODONE BITARTRATE AND ACETAMINOPHEN 1 TABLET: 5; 325 TABLET ORAL at 16:04

## 2019-01-22 RX ADMIN — CARVEDILOL 6.25 MG: 6.25 TABLET, FILM COATED ORAL at 17:13

## 2019-01-22 RX ADMIN — MAGNESIUM HYDROXIDE 30 ML: 400 SUSPENSION ORAL at 16:04

## 2019-01-22 RX ADMIN — SODIUM CHLORIDE: 9 INJECTION, SOLUTION INTRAVENOUS at 05:37

## 2019-01-22 RX ADMIN — ALLOPURINOL 300 MG: 300 TABLET ORAL at 08:18

## 2019-01-22 RX ADMIN — INSULIN LISPRO 3 UNITS: 100 INJECTION, SOLUTION INTRAVENOUS; SUBCUTANEOUS at 17:13

## 2019-01-22 RX ADMIN — CIPROFLOXACIN 500 MG: 500 TABLET ORAL at 08:18

## 2019-01-22 RX ADMIN — CARVEDILOL 6.25 MG: 6.25 TABLET, FILM COATED ORAL at 08:19

## 2019-01-22 ASSESSMENT — PAIN DESCRIPTION - PROGRESSION

## 2019-01-22 ASSESSMENT — PAIN SCALES - GENERAL
PAINLEVEL_OUTOF10: 0
PAINLEVEL_OUTOF10: 4
PAINLEVEL_OUTOF10: 0
PAINLEVEL_OUTOF10: 4
PAINLEVEL_OUTOF10: 0
PAINLEVEL_OUTOF10: 0

## 2019-02-06 ENCOUNTER — ANESTHESIA EVENT (OUTPATIENT)
Dept: OPERATING ROOM | Age: 49
End: 2019-02-06
Payer: COMMERCIAL

## 2019-02-07 ENCOUNTER — HOSPITAL ENCOUNTER (OUTPATIENT)
Age: 49
Setting detail: OUTPATIENT SURGERY
Discharge: HOME | End: 2019-02-07
Attending: UROLOGY | Admitting: UROLOGY
Payer: COMMERCIAL

## 2019-02-07 ENCOUNTER — APPOINTMENT (OUTPATIENT)
Dept: GENERAL RADIOLOGY | Age: 49
End: 2019-02-07
Attending: UROLOGY
Payer: COMMERCIAL

## 2019-02-07 ENCOUNTER — ANESTHESIA (OUTPATIENT)
Dept: OPERATING ROOM | Age: 49
End: 2019-02-07
Payer: COMMERCIAL

## 2019-02-07 VITALS
BODY MASS INDEX: 38.37 KG/M2 | OXYGEN SATURATION: 98 % | RESPIRATION RATE: 9 BRPM | SYSTOLIC BLOOD PRESSURE: 134 MMHG | WEIGHT: 268 LBS | TEMPERATURE: 97.5 F | HEART RATE: 99 BPM | DIASTOLIC BLOOD PRESSURE: 84 MMHG | HEIGHT: 70 IN

## 2019-02-07 VITALS
DIASTOLIC BLOOD PRESSURE: 58 MMHG | TEMPERATURE: 97 F | SYSTOLIC BLOOD PRESSURE: 114 MMHG | OXYGEN SATURATION: 96 % | RESPIRATION RATE: 20 BRPM

## 2019-02-07 DIAGNOSIS — N13.2 HYDRONEPHROSIS WITH URINARY OBSTRUCTION DUE TO URETERAL CALCULUS: Primary | ICD-10-CM

## 2019-02-07 LAB
GLUCOSE BLD-MCNC: 142 MG/DL (ref 75–110)
GLUCOSE BLD-MCNC: 181 MG/DL (ref 74–100)
POC POTASSIUM: 4.4 MMOL/L (ref 3.5–4.5)

## 2019-02-07 PROCEDURE — 6360000002 HC RX W HCPCS: Performed by: STUDENT IN AN ORGANIZED HEALTH CARE EDUCATION/TRAINING PROGRAM

## 2019-02-07 PROCEDURE — 84132 ASSAY OF SERUM POTASSIUM: CPT

## 2019-02-07 PROCEDURE — 6370000000 HC RX 637 (ALT 250 FOR IP): Performed by: UROLOGY

## 2019-02-07 PROCEDURE — 3700000001 HC ADD 15 MINUTES (ANESTHESIA): Performed by: UROLOGY

## 2019-02-07 PROCEDURE — 3600000014 HC SURGERY LEVEL 4 ADDTL 15MIN: Performed by: UROLOGY

## 2019-02-07 PROCEDURE — 7100000001 HC PACU RECOVERY - ADDTL 15 MIN: Performed by: UROLOGY

## 2019-02-07 PROCEDURE — 6360000002 HC RX W HCPCS: Performed by: NURSE ANESTHETIST, CERTIFIED REGISTERED

## 2019-02-07 PROCEDURE — C1758 CATHETER, URETERAL: HCPCS | Performed by: UROLOGY

## 2019-02-07 PROCEDURE — 6370000000 HC RX 637 (ALT 250 FOR IP): Performed by: STUDENT IN AN ORGANIZED HEALTH CARE EDUCATION/TRAINING PROGRAM

## 2019-02-07 PROCEDURE — 3700000000 HC ANESTHESIA ATTENDED CARE: Performed by: UROLOGY

## 2019-02-07 PROCEDURE — 7100000011 HC PHASE II RECOVERY - ADDTL 15 MIN: Performed by: UROLOGY

## 2019-02-07 PROCEDURE — C2617 STENT, NON-COR, TEM W/O DEL: HCPCS | Performed by: UROLOGY

## 2019-02-07 PROCEDURE — 7100000010 HC PHASE II RECOVERY - FIRST 15 MIN: Performed by: UROLOGY

## 2019-02-07 PROCEDURE — 7100000000 HC PACU RECOVERY - FIRST 15 MIN: Performed by: UROLOGY

## 2019-02-07 PROCEDURE — 3600000004 HC SURGERY LEVEL 4 BASE: Performed by: UROLOGY

## 2019-02-07 PROCEDURE — 2580000003 HC RX 258: Performed by: UROLOGY

## 2019-02-07 PROCEDURE — 74018 RADEX ABDOMEN 1 VIEW: CPT

## 2019-02-07 PROCEDURE — 2720000010 HC SURG SUPPLY STERILE: Performed by: UROLOGY

## 2019-02-07 PROCEDURE — 2500000003 HC RX 250 WO HCPCS: Performed by: NURSE ANESTHETIST, CERTIFIED REGISTERED

## 2019-02-07 PROCEDURE — 2709999900 HC NON-CHARGEABLE SUPPLY: Performed by: UROLOGY

## 2019-02-07 PROCEDURE — 82947 ASSAY GLUCOSE BLOOD QUANT: CPT

## 2019-02-07 PROCEDURE — C1769 GUIDE WIRE: HCPCS | Performed by: UROLOGY

## 2019-02-07 PROCEDURE — 2580000003 HC RX 258: Performed by: ANESTHESIOLOGY

## 2019-02-07 DEVICE — URETERAL STENT
Type: IMPLANTABLE DEVICE | Status: FUNCTIONAL
Brand: POLARIS™ ULTRA

## 2019-02-07 RX ORDER — TRAMADOL HYDROCHLORIDE 50 MG/1
50 TABLET ORAL ONCE
Status: COMPLETED | OUTPATIENT
Start: 2019-02-07 | End: 2019-02-07

## 2019-02-07 RX ORDER — SODIUM CHLORIDE, SODIUM LACTATE, POTASSIUM CHLORIDE, CALCIUM CHLORIDE 600; 310; 30; 20 MG/100ML; MG/100ML; MG/100ML; MG/100ML
INJECTION, SOLUTION INTRAVENOUS CONTINUOUS
Status: DISCONTINUED | OUTPATIENT
Start: 2019-02-07 | End: 2019-02-07 | Stop reason: HOSPADM

## 2019-02-07 RX ORDER — MAGNESIUM HYDROXIDE 1200 MG/15ML
LIQUID ORAL PRN
Status: DISCONTINUED | OUTPATIENT
Start: 2019-02-07 | End: 2019-02-07 | Stop reason: ALTCHOICE

## 2019-02-07 RX ORDER — ULTRASOUND COUPLING MEDIUM
GEL (GRAM) TOPICAL PRN
Status: DISCONTINUED | OUTPATIENT
Start: 2019-02-07 | End: 2019-02-07 | Stop reason: ALTCHOICE

## 2019-02-07 RX ORDER — GLYCOPYRROLATE 1 MG/5 ML
SYRINGE (ML) INTRAVENOUS PRN
Status: DISCONTINUED | OUTPATIENT
Start: 2019-02-07 | End: 2019-02-07 | Stop reason: SDUPTHER

## 2019-02-07 RX ORDER — CIPROFLOXACIN 2 MG/ML
400 INJECTION, SOLUTION INTRAVENOUS
Status: COMPLETED | OUTPATIENT
Start: 2019-02-07 | End: 2019-02-07

## 2019-02-07 RX ORDER — PROPOFOL 10 MG/ML
INJECTION, EMULSION INTRAVENOUS PRN
Status: DISCONTINUED | OUTPATIENT
Start: 2019-02-07 | End: 2019-02-07 | Stop reason: SDUPTHER

## 2019-02-07 RX ORDER — LIDOCAINE HYDROCHLORIDE 10 MG/ML
INJECTION, SOLUTION EPIDURAL; INFILTRATION; INTRACAUDAL; PERINEURAL PRN
Status: DISCONTINUED | OUTPATIENT
Start: 2019-02-07 | End: 2019-02-07 | Stop reason: SDUPTHER

## 2019-02-07 RX ORDER — TRAMADOL HYDROCHLORIDE 50 MG/1
50 TABLET ORAL EVERY 6 HOURS PRN
Qty: 15 TABLET | Refills: 0 | Status: SHIPPED | OUTPATIENT
Start: 2019-02-07 | End: 2019-02-10

## 2019-02-07 RX ORDER — TAMSULOSIN HYDROCHLORIDE 0.4 MG/1
0.4 CAPSULE ORAL DAILY
Qty: 30 CAPSULE | Refills: 0 | Status: ON HOLD | OUTPATIENT
Start: 2019-02-07 | End: 2019-02-12 | Stop reason: HOSPADM

## 2019-02-07 RX ORDER — MAGNESIUM HYDROXIDE 1200 MG/15ML
LIQUID ORAL CONTINUOUS PRN
Status: COMPLETED | OUTPATIENT
Start: 2019-02-07 | End: 2019-02-07

## 2019-02-07 RX ORDER — FENTANYL CITRATE 50 UG/ML
INJECTION, SOLUTION INTRAMUSCULAR; INTRAVENOUS PRN
Status: DISCONTINUED | OUTPATIENT
Start: 2019-02-07 | End: 2019-02-07 | Stop reason: SDUPTHER

## 2019-02-07 RX ORDER — ONDANSETRON 2 MG/ML
INJECTION INTRAMUSCULAR; INTRAVENOUS PRN
Status: DISCONTINUED | OUTPATIENT
Start: 2019-02-07 | End: 2019-02-07 | Stop reason: SDUPTHER

## 2019-02-07 RX ADMIN — CIPROFLOXACIN 400 MG: 2 INJECTION, SOLUTION INTRAVENOUS at 16:35

## 2019-02-07 RX ADMIN — TRAMADOL HYDROCHLORIDE 50 MG: 50 TABLET, FILM COATED ORAL at 18:11

## 2019-02-07 RX ADMIN — LIDOCAINE HYDROCHLORIDE 50 MG: 10 INJECTION, SOLUTION EPIDURAL; INFILTRATION; INTRACAUDAL; PERINEURAL at 16:29

## 2019-02-07 RX ADMIN — SODIUM CHLORIDE, POTASSIUM CHLORIDE, SODIUM LACTATE AND CALCIUM CHLORIDE: 600; 310; 30; 20 INJECTION, SOLUTION INTRAVENOUS at 14:35

## 2019-02-07 RX ADMIN — FENTANYL CITRATE 25 MCG: 50 INJECTION INTRAMUSCULAR; INTRAVENOUS at 16:48

## 2019-02-07 RX ADMIN — Medication 0.4 MG: at 16:29

## 2019-02-07 RX ADMIN — PROPOFOL 200 MG: 10 INJECTION, EMULSION INTRAVENOUS at 16:29

## 2019-02-07 RX ADMIN — FENTANYL CITRATE 25 MCG: 50 INJECTION INTRAMUSCULAR; INTRAVENOUS at 16:41

## 2019-02-07 RX ADMIN — PROPOFOL 200 MG: 10 INJECTION, EMULSION INTRAVENOUS at 16:31

## 2019-02-07 RX ADMIN — ONDANSETRON 4 MG: 2 INJECTION, SOLUTION INTRAMUSCULAR; INTRAVENOUS at 17:14

## 2019-02-07 ASSESSMENT — PULMONARY FUNCTION TESTS
PIF_VALUE: 8
PIF_VALUE: 24
PIF_VALUE: 3
PIF_VALUE: 14
PIF_VALUE: 5
PIF_VALUE: 14
PIF_VALUE: 22
PIF_VALUE: 14
PIF_VALUE: 2
PIF_VALUE: 25
PIF_VALUE: 22
PIF_VALUE: 2
PIF_VALUE: 19
PIF_VALUE: 14
PIF_VALUE: 19
PIF_VALUE: 2
PIF_VALUE: 20
PIF_VALUE: 2
PIF_VALUE: 18
PIF_VALUE: 14
PIF_VALUE: 2
PIF_VALUE: 15
PIF_VALUE: 3
PIF_VALUE: 2
PIF_VALUE: 9
PIF_VALUE: 2
PIF_VALUE: 2
PIF_VALUE: 14
PIF_VALUE: 14
PIF_VALUE: 1
PIF_VALUE: 3
PIF_VALUE: 24
PIF_VALUE: 2
PIF_VALUE: 2
PIF_VALUE: 19
PIF_VALUE: 2
PIF_VALUE: 14
PIF_VALUE: 3
PIF_VALUE: 26
PIF_VALUE: 15
PIF_VALUE: 24
PIF_VALUE: 6
PIF_VALUE: 18
PIF_VALUE: 2
PIF_VALUE: 14
PIF_VALUE: 2
PIF_VALUE: 14
PIF_VALUE: 5
PIF_VALUE: 14
PIF_VALUE: 2
PIF_VALUE: 2
PIF_VALUE: 14
PIF_VALUE: 25
PIF_VALUE: 2
PIF_VALUE: 9
PIF_VALUE: 24
PIF_VALUE: 22
PIF_VALUE: 20

## 2019-02-07 ASSESSMENT — PAIN SCALES - GENERAL
PAINLEVEL_OUTOF10: 0
PAINLEVEL_OUTOF10: 2
PAINLEVEL_OUTOF10: 0

## 2019-02-07 ASSESSMENT — PAIN - FUNCTIONAL ASSESSMENT: PAIN_FUNCTIONAL_ASSESSMENT: 0-10

## 2019-02-11 ENCOUNTER — ANESTHESIA EVENT (OUTPATIENT)
Dept: OPERATING ROOM | Age: 49
End: 2019-02-11
Payer: COMMERCIAL

## 2019-02-12 ENCOUNTER — ANESTHESIA (OUTPATIENT)
Dept: OPERATING ROOM | Age: 49
End: 2019-02-12
Payer: COMMERCIAL

## 2019-02-12 ENCOUNTER — HOSPITAL ENCOUNTER (OUTPATIENT)
Age: 49
Setting detail: OUTPATIENT SURGERY
Discharge: HOME | End: 2019-02-12
Attending: UROLOGY | Admitting: UROLOGY
Payer: COMMERCIAL

## 2019-02-12 VITALS
HEART RATE: 86 BPM | OXYGEN SATURATION: 96 % | SYSTOLIC BLOOD PRESSURE: 145 MMHG | TEMPERATURE: 97.3 F | WEIGHT: 268.96 LBS | HEIGHT: 70 IN | RESPIRATION RATE: 16 BRPM | DIASTOLIC BLOOD PRESSURE: 72 MMHG | BODY MASS INDEX: 38.51 KG/M2

## 2019-02-12 VITALS — DIASTOLIC BLOOD PRESSURE: 94 MMHG | OXYGEN SATURATION: 99 % | SYSTOLIC BLOOD PRESSURE: 146 MMHG

## 2019-02-12 LAB
GLUCOSE BLD-MCNC: 163 MG/DL (ref 75–110)
GLUCOSE BLD-MCNC: 199 MG/DL (ref 75–110)

## 2019-02-12 PROCEDURE — 2580000003 HC RX 258: Performed by: ANESTHESIOLOGY

## 2019-02-12 PROCEDURE — 7100000041 HC SPAR PHASE II RECOVERY - ADDTL 15 MIN: Performed by: UROLOGY

## 2019-02-12 PROCEDURE — 3600000012 HC SURGERY LEVEL 2 ADDTL 15MIN: Performed by: UROLOGY

## 2019-02-12 PROCEDURE — 82947 ASSAY GLUCOSE BLOOD QUANT: CPT

## 2019-02-12 PROCEDURE — 6370000000 HC RX 637 (ALT 250 FOR IP): Performed by: UROLOGY

## 2019-02-12 PROCEDURE — 3700000000 HC ANESTHESIA ATTENDED CARE: Performed by: UROLOGY

## 2019-02-12 PROCEDURE — 7100000040 HC SPAR PHASE II RECOVERY - FIRST 15 MIN: Performed by: UROLOGY

## 2019-02-12 PROCEDURE — 2709999900 HC NON-CHARGEABLE SUPPLY: Performed by: UROLOGY

## 2019-02-12 PROCEDURE — 3600000002 HC SURGERY LEVEL 2 BASE: Performed by: UROLOGY

## 2019-02-12 PROCEDURE — 6360000002 HC RX W HCPCS: Performed by: NURSE ANESTHETIST, CERTIFIED REGISTERED

## 2019-02-12 PROCEDURE — 3700000001 HC ADD 15 MINUTES (ANESTHESIA): Performed by: UROLOGY

## 2019-02-12 PROCEDURE — 2500000003 HC RX 250 WO HCPCS: Performed by: NURSE ANESTHETIST, CERTIFIED REGISTERED

## 2019-02-12 RX ORDER — PROPOFOL 10 MG/ML
INJECTION, EMULSION INTRAVENOUS PRN
Status: DISCONTINUED | OUTPATIENT
Start: 2019-02-12 | End: 2019-02-12 | Stop reason: SDUPTHER

## 2019-02-12 RX ORDER — SODIUM CHLORIDE, SODIUM LACTATE, POTASSIUM CHLORIDE, CALCIUM CHLORIDE 600; 310; 30; 20 MG/100ML; MG/100ML; MG/100ML; MG/100ML
INJECTION, SOLUTION INTRAVENOUS CONTINUOUS
Status: DISCONTINUED | OUTPATIENT
Start: 2019-02-12 | End: 2019-02-12 | Stop reason: HOSPADM

## 2019-02-12 RX ORDER — LIDOCAINE HYDROCHLORIDE 10 MG/ML
1 INJECTION, SOLUTION EPIDURAL; INFILTRATION; INTRACAUDAL; PERINEURAL
Status: DISCONTINUED | OUTPATIENT
Start: 2019-02-12 | End: 2019-02-12 | Stop reason: HOSPADM

## 2019-02-12 RX ORDER — LIDOCAINE HYDROCHLORIDE 10 MG/ML
INJECTION, SOLUTION INFILTRATION; PERINEURAL PRN
Status: DISCONTINUED | OUTPATIENT
Start: 2019-02-12 | End: 2019-02-12 | Stop reason: SDUPTHER

## 2019-02-12 RX ADMIN — PROPOFOL 20 MG: 10 INJECTION, EMULSION INTRAVENOUS at 10:43

## 2019-02-12 RX ADMIN — PROPOFOL 30 MG: 10 INJECTION, EMULSION INTRAVENOUS at 10:38

## 2019-02-12 RX ADMIN — SODIUM CHLORIDE, POTASSIUM CHLORIDE, SODIUM LACTATE AND CALCIUM CHLORIDE: 600; 310; 30; 20 INJECTION, SOLUTION INTRAVENOUS at 10:03

## 2019-02-12 RX ADMIN — PROPOFOL 20 MG: 10 INJECTION, EMULSION INTRAVENOUS at 10:37

## 2019-02-12 RX ADMIN — PROPOFOL 20 MG: 10 INJECTION, EMULSION INTRAVENOUS at 10:45

## 2019-02-12 RX ADMIN — PROPOFOL 30 MG: 10 INJECTION, EMULSION INTRAVENOUS at 10:36

## 2019-02-12 RX ADMIN — PROPOFOL 30 MG: 10 INJECTION, EMULSION INTRAVENOUS at 10:39

## 2019-02-12 RX ADMIN — LIDOCAINE HYDROCHLORIDE 50 MG: 10 INJECTION, SOLUTION INFILTRATION; PERINEURAL at 10:35

## 2019-02-12 RX ADMIN — PROPOFOL 20 MG: 10 INJECTION, EMULSION INTRAVENOUS at 10:44

## 2019-02-12 RX ADMIN — PROPOFOL 20 MG: 10 INJECTION, EMULSION INTRAVENOUS at 10:40

## 2019-02-12 RX ADMIN — PROPOFOL 50 MG: 10 INJECTION, EMULSION INTRAVENOUS at 10:35

## 2019-02-12 RX ADMIN — PROPOFOL 20 MG: 10 INJECTION, EMULSION INTRAVENOUS at 10:41

## 2019-02-12 RX ADMIN — PROPOFOL 20 MG: 10 INJECTION, EMULSION INTRAVENOUS at 10:42

## 2019-02-12 ASSESSMENT — PULMONARY FUNCTION TESTS
PIF_VALUE: 1

## 2019-02-12 ASSESSMENT — PAIN SCALES - GENERAL
PAINLEVEL_OUTOF10: 0

## 2019-02-12 ASSESSMENT — PAIN - FUNCTIONAL ASSESSMENT: PAIN_FUNCTIONAL_ASSESSMENT: 0-10

## 2019-11-15 RX ORDER — ICOSAPENT ETHYL 1000 MG/1
2 CAPSULE ORAL 2 TIMES DAILY
COMMUNITY

## 2019-11-19 ENCOUNTER — HOSPITAL ENCOUNTER (OUTPATIENT)
Age: 49
Setting detail: OUTPATIENT SURGERY
Discharge: HOME | End: 2019-11-19
Attending: UROLOGY | Admitting: UROLOGY
Payer: COMMERCIAL

## 2019-11-19 ENCOUNTER — APPOINTMENT (OUTPATIENT)
Dept: GENERAL RADIOLOGY | Age: 49
End: 2019-11-19
Attending: UROLOGY
Payer: COMMERCIAL

## 2019-11-19 ENCOUNTER — ANESTHESIA EVENT (OUTPATIENT)
Dept: OPERATING ROOM | Age: 49
End: 2019-11-19
Payer: COMMERCIAL

## 2019-11-19 ENCOUNTER — ANESTHESIA (OUTPATIENT)
Dept: OPERATING ROOM | Age: 49
End: 2019-11-19
Payer: COMMERCIAL

## 2019-11-19 VITALS — TEMPERATURE: 96 F | SYSTOLIC BLOOD PRESSURE: 124 MMHG | OXYGEN SATURATION: 99 % | DIASTOLIC BLOOD PRESSURE: 89 MMHG

## 2019-11-19 VITALS
SYSTOLIC BLOOD PRESSURE: 151 MMHG | HEIGHT: 70 IN | RESPIRATION RATE: 15 BRPM | WEIGHT: 285.06 LBS | DIASTOLIC BLOOD PRESSURE: 97 MMHG | BODY MASS INDEX: 40.81 KG/M2 | TEMPERATURE: 98.4 F | OXYGEN SATURATION: 93 % | HEART RATE: 95 BPM

## 2019-11-19 DIAGNOSIS — N13.2 HYDRONEPHROSIS WITH URINARY OBSTRUCTION DUE TO URETERAL CALCULUS: Primary | ICD-10-CM

## 2019-11-19 LAB
GFR NON-AFRICAN AMERICAN: >60 ML/MIN
GFR SERPL CREATININE-BSD FRML MDRD: >60 ML/MIN
GFR SERPL CREATININE-BSD FRML MDRD: NORMAL ML/MIN/{1.73_M2}
GLUCOSE BLD-MCNC: 196 MG/DL (ref 74–100)
GLUCOSE BLD-MCNC: 211 MG/DL (ref 75–110)
POC CREATININE: 1.01 MG/DL (ref 0.51–1.19)

## 2019-11-19 PROCEDURE — 7100000001 HC PACU RECOVERY - ADDTL 15 MIN: Performed by: UROLOGY

## 2019-11-19 PROCEDURE — 7100000000 HC PACU RECOVERY - FIRST 15 MIN: Performed by: UROLOGY

## 2019-11-19 PROCEDURE — 3600000014 HC SURGERY LEVEL 4 ADDTL 15MIN: Performed by: UROLOGY

## 2019-11-19 PROCEDURE — C1758 CATHETER, URETERAL: HCPCS | Performed by: UROLOGY

## 2019-11-19 PROCEDURE — 6360000002 HC RX W HCPCS: Performed by: NURSE ANESTHETIST, CERTIFIED REGISTERED

## 2019-11-19 PROCEDURE — 74018 RADEX ABDOMEN 1 VIEW: CPT

## 2019-11-19 PROCEDURE — 3600000004 HC SURGERY LEVEL 4 BASE: Performed by: UROLOGY

## 2019-11-19 PROCEDURE — 2580000003 HC RX 258: Performed by: UROLOGY

## 2019-11-19 PROCEDURE — 3700000000 HC ANESTHESIA ATTENDED CARE: Performed by: UROLOGY

## 2019-11-19 PROCEDURE — 2580000003 HC RX 258: Performed by: ANESTHESIOLOGY

## 2019-11-19 PROCEDURE — 2500000003 HC RX 250 WO HCPCS: Performed by: NURSE ANESTHETIST, CERTIFIED REGISTERED

## 2019-11-19 PROCEDURE — C1769 GUIDE WIRE: HCPCS | Performed by: UROLOGY

## 2019-11-19 PROCEDURE — 82565 ASSAY OF CREATININE: CPT

## 2019-11-19 PROCEDURE — 82947 ASSAY GLUCOSE BLOOD QUANT: CPT

## 2019-11-19 PROCEDURE — C2617 STENT, NON-COR, TEM W/O DEL: HCPCS | Performed by: UROLOGY

## 2019-11-19 PROCEDURE — 3700000001 HC ADD 15 MINUTES (ANESTHESIA): Performed by: UROLOGY

## 2019-11-19 PROCEDURE — 7100000011 HC PHASE II RECOVERY - ADDTL 15 MIN: Performed by: UROLOGY

## 2019-11-19 PROCEDURE — 2720000010 HC SURG SUPPLY STERILE: Performed by: UROLOGY

## 2019-11-19 PROCEDURE — 93005 ELECTROCARDIOGRAM TRACING: CPT | Performed by: UROLOGY

## 2019-11-19 PROCEDURE — 7100000010 HC PHASE II RECOVERY - FIRST 15 MIN: Performed by: UROLOGY

## 2019-11-19 PROCEDURE — 6360000002 HC RX W HCPCS: Performed by: STUDENT IN AN ORGANIZED HEALTH CARE EDUCATION/TRAINING PROGRAM

## 2019-11-19 PROCEDURE — 2709999900 HC NON-CHARGEABLE SUPPLY: Performed by: UROLOGY

## 2019-11-19 DEVICE — URETERAL STENT
Type: IMPLANTABLE DEVICE | Status: FUNCTIONAL
Brand: POLARIS™ ULTRA

## 2019-11-19 RX ORDER — PHENYLEPHRINE HYDROCHLORIDE 10 MG/ML
INJECTION INTRAVENOUS PRN
Status: DISCONTINUED | OUTPATIENT
Start: 2019-11-19 | End: 2019-11-19 | Stop reason: SDUPTHER

## 2019-11-19 RX ORDER — OXYBUTYNIN CHLORIDE 10 MG/1
10 TABLET, EXTENDED RELEASE ORAL DAILY PRN
Qty: 14 TABLET | Refills: 0 | Status: SHIPPED | OUTPATIENT
Start: 2019-11-19 | End: 2020-03-12

## 2019-11-19 RX ORDER — FENTANYL CITRATE 50 UG/ML
50 INJECTION, SOLUTION INTRAMUSCULAR; INTRAVENOUS EVERY 5 MIN PRN
Status: DISCONTINUED | OUTPATIENT
Start: 2019-11-19 | End: 2019-11-19 | Stop reason: HOSPADM

## 2019-11-19 RX ORDER — SODIUM CHLORIDE 0.9 % (FLUSH) 0.9 %
10 SYRINGE (ML) INJECTION PRN
Status: DISCONTINUED | OUTPATIENT
Start: 2019-11-19 | End: 2019-11-19 | Stop reason: HOSPADM

## 2019-11-19 RX ORDER — PROPOFOL 10 MG/ML
INJECTION, EMULSION INTRAVENOUS PRN
Status: DISCONTINUED | OUTPATIENT
Start: 2019-11-19 | End: 2019-11-19 | Stop reason: SDUPTHER

## 2019-11-19 RX ORDER — CIPROFLOXACIN 2 MG/ML
400 INJECTION, SOLUTION INTRAVENOUS
Status: COMPLETED | OUTPATIENT
Start: 2019-11-19 | End: 2019-11-19

## 2019-11-19 RX ORDER — SUCCINYLCHOLINE/SOD CL,ISO/PF 100 MG/5ML
SYRINGE (ML) INTRAVENOUS PRN
Status: DISCONTINUED | OUTPATIENT
Start: 2019-11-19 | End: 2019-11-19 | Stop reason: SDUPTHER

## 2019-11-19 RX ORDER — HYDROCODONE BITARTRATE AND ACETAMINOPHEN 5; 325 MG/1; MG/1
2 TABLET ORAL EVERY 6 HOURS PRN
Qty: 15 TABLET | Refills: 0 | Status: SHIPPED | OUTPATIENT
Start: 2019-11-19 | End: 2019-11-22

## 2019-11-19 RX ORDER — FENTANYL CITRATE 50 UG/ML
INJECTION, SOLUTION INTRAMUSCULAR; INTRAVENOUS PRN
Status: DISCONTINUED | OUTPATIENT
Start: 2019-11-19 | End: 2019-11-19 | Stop reason: SDUPTHER

## 2019-11-19 RX ORDER — SODIUM CHLORIDE, SODIUM LACTATE, POTASSIUM CHLORIDE, CALCIUM CHLORIDE 600; 310; 30; 20 MG/100ML; MG/100ML; MG/100ML; MG/100ML
INJECTION, SOLUTION INTRAVENOUS CONTINUOUS
Status: DISCONTINUED | OUTPATIENT
Start: 2019-11-19 | End: 2019-11-19 | Stop reason: HOSPADM

## 2019-11-19 RX ORDER — SODIUM CHLORIDE 0.9 % (FLUSH) 0.9 %
10 SYRINGE (ML) INJECTION EVERY 12 HOURS SCHEDULED
Status: DISCONTINUED | OUTPATIENT
Start: 2019-11-19 | End: 2019-11-19 | Stop reason: HOSPADM

## 2019-11-19 RX ORDER — FENTANYL CITRATE 50 UG/ML
25 INJECTION, SOLUTION INTRAMUSCULAR; INTRAVENOUS ONCE
Status: DISCONTINUED | OUTPATIENT
Start: 2019-11-19 | End: 2019-11-19 | Stop reason: HOSPADM

## 2019-11-19 RX ORDER — DOCUSATE SODIUM 100 MG/1
100 CAPSULE, LIQUID FILLED ORAL 2 TIMES DAILY
Qty: 14 CAPSULE | Refills: 0 | Status: SHIPPED | OUTPATIENT
Start: 2019-11-19 | End: 2019-11-26

## 2019-11-19 RX ORDER — MAGNESIUM HYDROXIDE 1200 MG/15ML
LIQUID ORAL CONTINUOUS PRN
Status: COMPLETED | OUTPATIENT
Start: 2019-11-19 | End: 2019-11-19

## 2019-11-19 RX ORDER — ONDANSETRON 2 MG/ML
4 INJECTION INTRAMUSCULAR; INTRAVENOUS ONCE
Status: DISCONTINUED | OUTPATIENT
Start: 2019-11-19 | End: 2019-11-19 | Stop reason: HOSPADM

## 2019-11-19 RX ORDER — ALLOPURINOL 100 MG/1
100 TABLET ORAL DAILY
COMMUNITY

## 2019-11-19 RX ORDER — LIDOCAINE HYDROCHLORIDE 10 MG/ML
INJECTION, SOLUTION EPIDURAL; INFILTRATION; INTRACAUDAL; PERINEURAL PRN
Status: DISCONTINUED | OUTPATIENT
Start: 2019-11-19 | End: 2019-11-19 | Stop reason: SDUPTHER

## 2019-11-19 RX ORDER — POTASSIUM CITRATE 10 MEQ/1
10 TABLET, EXTENDED RELEASE ORAL
Qty: 90 TABLET | Refills: 3 | Status: SHIPPED | OUTPATIENT
Start: 2019-11-19

## 2019-11-19 RX ORDER — DEXAMETHASONE SODIUM PHOSPHATE 10 MG/ML
INJECTION INTRAMUSCULAR; INTRAVENOUS PRN
Status: DISCONTINUED | OUTPATIENT
Start: 2019-11-19 | End: 2019-11-19 | Stop reason: SDUPTHER

## 2019-11-19 RX ORDER — ONDANSETRON 2 MG/ML
INJECTION INTRAMUSCULAR; INTRAVENOUS PRN
Status: DISCONTINUED | OUTPATIENT
Start: 2019-11-19 | End: 2019-11-19 | Stop reason: SDUPTHER

## 2019-11-19 RX ORDER — TAMSULOSIN HYDROCHLORIDE 0.4 MG/1
0.4 CAPSULE ORAL DAILY
Qty: 14 CAPSULE | Refills: 1 | Status: ON HOLD | OUTPATIENT
Start: 2019-11-19 | End: 2019-11-27 | Stop reason: HOSPADM

## 2019-11-19 RX ORDER — MIDAZOLAM HYDROCHLORIDE 1 MG/ML
2 INJECTION INTRAMUSCULAR; INTRAVENOUS
Status: DISCONTINUED | OUTPATIENT
Start: 2019-11-19 | End: 2019-11-19 | Stop reason: HOSPADM

## 2019-11-19 RX ADMIN — PHENYLEPHRINE HYDROCHLORIDE 200 MCG: 10 INJECTION INTRAVENOUS at 12:43

## 2019-11-19 RX ADMIN — SODIUM CHLORIDE, POTASSIUM CHLORIDE, SODIUM LACTATE AND CALCIUM CHLORIDE: 600; 310; 30; 20 INJECTION, SOLUTION INTRAVENOUS at 11:25

## 2019-11-19 RX ADMIN — PROPOFOL 200 MG: 10 INJECTION, EMULSION INTRAVENOUS at 11:57

## 2019-11-19 RX ADMIN — DEXAMETHASONE SODIUM PHOSPHATE 10 MG: 10 INJECTION INTRAMUSCULAR; INTRAVENOUS at 12:06

## 2019-11-19 RX ADMIN — PROPOFOL 50 MG: 10 INJECTION, EMULSION INTRAVENOUS at 12:21

## 2019-11-19 RX ADMIN — LIDOCAINE HYDROCHLORIDE 50 MG: 10 INJECTION, SOLUTION EPIDURAL; INFILTRATION; INTRACAUDAL; PERINEURAL at 11:57

## 2019-11-19 RX ADMIN — FENTANYL CITRATE 100 MCG: 50 INJECTION INTRAMUSCULAR; INTRAVENOUS at 11:57

## 2019-11-19 RX ADMIN — CIPROFLOXACIN 400 MG: 2 INJECTION, SOLUTION INTRAVENOUS at 12:06

## 2019-11-19 RX ADMIN — PROPOFOL 50 MG: 10 INJECTION, EMULSION INTRAVENOUS at 12:00

## 2019-11-19 RX ADMIN — ONDANSETRON 4 MG: 2 INJECTION, SOLUTION INTRAMUSCULAR; INTRAVENOUS at 12:11

## 2019-11-19 RX ADMIN — PHENYLEPHRINE HYDROCHLORIDE 100 MCG: 10 INJECTION INTRAVENOUS at 12:35

## 2019-11-19 RX ADMIN — Medication 100 MG: at 11:59

## 2019-11-19 RX ADMIN — PHENYLEPHRINE HYDROCHLORIDE 100 MCG: 10 INJECTION INTRAVENOUS at 12:38

## 2019-11-19 ASSESSMENT — PULMONARY FUNCTION TESTS
PIF_VALUE: 28
PIF_VALUE: 28
PIF_VALUE: 26
PIF_VALUE: 29
PIF_VALUE: 22
PIF_VALUE: 29
PIF_VALUE: 35
PIF_VALUE: 24
PIF_VALUE: 28
PIF_VALUE: 33
PIF_VALUE: 26
PIF_VALUE: 27
PIF_VALUE: 1
PIF_VALUE: 27
PIF_VALUE: 1
PIF_VALUE: 26
PIF_VALUE: 27
PIF_VALUE: 1
PIF_VALUE: 29
PIF_VALUE: 26
PIF_VALUE: 29
PIF_VALUE: 5
PIF_VALUE: 1
PIF_VALUE: 2
PIF_VALUE: 27
PIF_VALUE: 22
PIF_VALUE: 29
PIF_VALUE: 21
PIF_VALUE: 29
PIF_VALUE: 28
PIF_VALUE: 28
PIF_VALUE: 26
PIF_VALUE: 29
PIF_VALUE: 28
PIF_VALUE: 29
PIF_VALUE: 1
PIF_VALUE: 27
PIF_VALUE: 1
PIF_VALUE: 28
PIF_VALUE: 26
PIF_VALUE: 28
PIF_VALUE: 15
PIF_VALUE: 32
PIF_VALUE: 32
PIF_VALUE: 1
PIF_VALUE: 30
PIF_VALUE: 1
PIF_VALUE: 29
PIF_VALUE: 1
PIF_VALUE: 26
PIF_VALUE: 29
PIF_VALUE: 27
PIF_VALUE: 26
PIF_VALUE: 29
PIF_VALUE: 2
PIF_VALUE: 34
PIF_VALUE: 29
PIF_VALUE: 26
PIF_VALUE: 26
PIF_VALUE: 1
PIF_VALUE: 31
PIF_VALUE: 29
PIF_VALUE: 26
PIF_VALUE: 23
PIF_VALUE: 32
PIF_VALUE: 28
PIF_VALUE: 22
PIF_VALUE: 25
PIF_VALUE: 24
PIF_VALUE: 1
PIF_VALUE: 28
PIF_VALUE: 21
PIF_VALUE: 28
PIF_VALUE: 26
PIF_VALUE: 39
PIF_VALUE: 29
PIF_VALUE: 28
PIF_VALUE: 28
PIF_VALUE: 1
PIF_VALUE: 27
PIF_VALUE: 29

## 2019-11-19 ASSESSMENT — PAIN SCALES - GENERAL
PAINLEVEL_OUTOF10: 0
PAINLEVEL_OUTOF10: 3
PAINLEVEL_OUTOF10: 0
PAINLEVEL_OUTOF10: 3
PAINLEVEL_OUTOF10: 3

## 2019-11-19 ASSESSMENT — PAIN DESCRIPTION - ORIENTATION: ORIENTATION: RIGHT;LOWER

## 2019-11-19 ASSESSMENT — PAIN - FUNCTIONAL ASSESSMENT: PAIN_FUNCTIONAL_ASSESSMENT: 0-10

## 2019-11-19 ASSESSMENT — PAIN DESCRIPTION - LOCATION: LOCATION: ABDOMEN

## 2019-11-20 LAB
EKG ATRIAL RATE: 82 BPM
EKG P AXIS: 41 DEGREES
EKG P-R INTERVAL: 162 MS
EKG Q-T INTERVAL: 370 MS
EKG QRS DURATION: 92 MS
EKG QTC CALCULATION (BAZETT): 432 MS
EKG R AXIS: -49 DEGREES
EKG T AXIS: 40 DEGREES
EKG VENTRICULAR RATE: 82 BPM

## 2019-11-20 PROCEDURE — 93010 ELECTROCARDIOGRAM REPORT: CPT | Performed by: INTERNAL MEDICINE

## 2019-11-27 ENCOUNTER — ANESTHESIA EVENT (OUTPATIENT)
Dept: OPERATING ROOM | Age: 49
End: 2019-11-27
Payer: COMMERCIAL

## 2019-11-27 ENCOUNTER — HOSPITAL ENCOUNTER (OUTPATIENT)
Age: 49
Setting detail: OUTPATIENT SURGERY
Discharge: HOME | End: 2019-11-27
Attending: UROLOGY | Admitting: UROLOGY
Payer: COMMERCIAL

## 2019-11-27 ENCOUNTER — ANESTHESIA (OUTPATIENT)
Dept: OPERATING ROOM | Age: 49
End: 2019-11-27
Payer: COMMERCIAL

## 2019-11-27 ENCOUNTER — APPOINTMENT (OUTPATIENT)
Dept: GENERAL RADIOLOGY | Age: 49
End: 2019-11-27
Attending: UROLOGY
Payer: COMMERCIAL

## 2019-11-27 VITALS
HEIGHT: 70 IN | HEART RATE: 90 BPM | SYSTOLIC BLOOD PRESSURE: 132 MMHG | BODY MASS INDEX: 40.8 KG/M2 | OXYGEN SATURATION: 99 % | TEMPERATURE: 97.3 F | DIASTOLIC BLOOD PRESSURE: 77 MMHG | WEIGHT: 285 LBS | RESPIRATION RATE: 19 BRPM

## 2019-11-27 VITALS — DIASTOLIC BLOOD PRESSURE: 68 MMHG | OXYGEN SATURATION: 95 % | SYSTOLIC BLOOD PRESSURE: 119 MMHG

## 2019-11-27 DIAGNOSIS — G89.18 POSTOPERATIVE PAIN: Primary | ICD-10-CM

## 2019-11-27 LAB
GLUCOSE BLD-MCNC: 191 MG/DL (ref 74–100)
GLUCOSE BLD-MCNC: 197 MG/DL (ref 75–110)
POC POTASSIUM: 4.2 MMOL/L (ref 3.5–4.5)

## 2019-11-27 PROCEDURE — 6360000002 HC RX W HCPCS: Performed by: PHYSICIAN ASSISTANT

## 2019-11-27 PROCEDURE — 7100000000 HC PACU RECOVERY - FIRST 15 MIN: Performed by: UROLOGY

## 2019-11-27 PROCEDURE — 6360000002 HC RX W HCPCS: Performed by: NURSE ANESTHETIST, CERTIFIED REGISTERED

## 2019-11-27 PROCEDURE — C1769 GUIDE WIRE: HCPCS | Performed by: UROLOGY

## 2019-11-27 PROCEDURE — 7100000010 HC PHASE II RECOVERY - FIRST 15 MIN: Performed by: UROLOGY

## 2019-11-27 PROCEDURE — 2709999900 HC NON-CHARGEABLE SUPPLY: Performed by: UROLOGY

## 2019-11-27 PROCEDURE — 7100000011 HC PHASE II RECOVERY - ADDTL 15 MIN: Performed by: UROLOGY

## 2019-11-27 PROCEDURE — 2580000003 HC RX 258: Performed by: UROLOGY

## 2019-11-27 PROCEDURE — 2500000003 HC RX 250 WO HCPCS: Performed by: NURSE ANESTHETIST, CERTIFIED REGISTERED

## 2019-11-27 PROCEDURE — 84132 ASSAY OF SERUM POTASSIUM: CPT

## 2019-11-27 PROCEDURE — 3600000014 HC SURGERY LEVEL 4 ADDTL 15MIN: Performed by: UROLOGY

## 2019-11-27 PROCEDURE — C2617 STENT, NON-COR, TEM W/O DEL: HCPCS | Performed by: UROLOGY

## 2019-11-27 PROCEDURE — 2580000003 HC RX 258: Performed by: ANESTHESIOLOGY

## 2019-11-27 PROCEDURE — 2500000003 HC RX 250 WO HCPCS: Performed by: ANESTHESIOLOGY

## 2019-11-27 PROCEDURE — 2720000010 HC SURG SUPPLY STERILE: Performed by: UROLOGY

## 2019-11-27 PROCEDURE — 3700000001 HC ADD 15 MINUTES (ANESTHESIA): Performed by: UROLOGY

## 2019-11-27 PROCEDURE — 82947 ASSAY GLUCOSE BLOOD QUANT: CPT

## 2019-11-27 PROCEDURE — 7100000001 HC PACU RECOVERY - ADDTL 15 MIN: Performed by: UROLOGY

## 2019-11-27 PROCEDURE — 3700000000 HC ANESTHESIA ATTENDED CARE: Performed by: UROLOGY

## 2019-11-27 PROCEDURE — 74018 RADEX ABDOMEN 1 VIEW: CPT

## 2019-11-27 PROCEDURE — 3600000004 HC SURGERY LEVEL 4 BASE: Performed by: UROLOGY

## 2019-11-27 DEVICE — URETERAL STENT
Type: IMPLANTABLE DEVICE | Site: URETER | Status: FUNCTIONAL
Brand: POLARIS™ ULTRA

## 2019-11-27 RX ORDER — HYDROCODONE BITARTRATE AND ACETAMINOPHEN 5; 325 MG/1; MG/1
1 TABLET ORAL PRN
Status: DISCONTINUED | OUTPATIENT
Start: 2019-11-27 | End: 2019-11-27 | Stop reason: HOSPADM

## 2019-11-27 RX ORDER — LABETALOL HYDROCHLORIDE 5 MG/ML
5 INJECTION, SOLUTION INTRAVENOUS EVERY 10 MIN PRN
Status: DISCONTINUED | OUTPATIENT
Start: 2019-11-27 | End: 2019-11-27 | Stop reason: HOSPADM

## 2019-11-27 RX ORDER — TAMSULOSIN HYDROCHLORIDE 0.4 MG/1
0.4 CAPSULE ORAL DAILY
Qty: 30 CAPSULE | Refills: 0 | Status: SHIPPED | OUTPATIENT
Start: 2019-11-27 | End: 2020-03-12

## 2019-11-27 RX ORDER — HYDROCODONE BITARTRATE AND ACETAMINOPHEN 5; 325 MG/1; MG/1
2 TABLET ORAL PRN
Status: DISCONTINUED | OUTPATIENT
Start: 2019-11-27 | End: 2019-11-27 | Stop reason: HOSPADM

## 2019-11-27 RX ORDER — SODIUM CHLORIDE 0.9 % (FLUSH) 0.9 %
10 SYRINGE (ML) INJECTION PRN
Status: DISCONTINUED | OUTPATIENT
Start: 2019-11-27 | End: 2019-11-27 | Stop reason: HOSPADM

## 2019-11-27 RX ORDER — SODIUM CHLORIDE 0.9 % (FLUSH) 0.9 %
10 SYRINGE (ML) INJECTION EVERY 12 HOURS SCHEDULED
Status: DISCONTINUED | OUTPATIENT
Start: 2019-11-27 | End: 2019-11-27 | Stop reason: HOSPADM

## 2019-11-27 RX ORDER — SODIUM CHLORIDE, SODIUM LACTATE, POTASSIUM CHLORIDE, CALCIUM CHLORIDE 600; 310; 30; 20 MG/100ML; MG/100ML; MG/100ML; MG/100ML
INJECTION, SOLUTION INTRAVENOUS CONTINUOUS
Status: DISCONTINUED | OUTPATIENT
Start: 2019-11-27 | End: 2019-11-27 | Stop reason: HOSPADM

## 2019-11-27 RX ORDER — KETAMINE HYDROCHLORIDE 100 MG/ML
INJECTION, SOLUTION INTRAMUSCULAR; INTRAVENOUS PRN
Status: DISCONTINUED | OUTPATIENT
Start: 2019-11-27 | End: 2019-11-27 | Stop reason: SDUPTHER

## 2019-11-27 RX ORDER — TRAMADOL HYDROCHLORIDE 50 MG/1
50 TABLET ORAL EVERY 6 HOURS PRN
Qty: 15 TABLET | Refills: 0 | Status: SHIPPED | OUTPATIENT
Start: 2019-11-27 | End: 2019-11-27 | Stop reason: SDUPTHER

## 2019-11-27 RX ORDER — TAMSULOSIN HYDROCHLORIDE 0.4 MG/1
0.4 CAPSULE ORAL DAILY
Qty: 30 CAPSULE | Refills: 0 | Status: SHIPPED | OUTPATIENT
Start: 2019-11-27 | End: 2019-11-27 | Stop reason: SDUPTHER

## 2019-11-27 RX ORDER — ONDANSETRON 2 MG/ML
4 INJECTION INTRAMUSCULAR; INTRAVENOUS
Status: DISCONTINUED | OUTPATIENT
Start: 2019-11-27 | End: 2019-11-27 | Stop reason: HOSPADM

## 2019-11-27 RX ORDER — SULFAMETHOXAZOLE AND TRIMETHOPRIM 800; 160 MG/1; MG/1
1 TABLET ORAL 2 TIMES DAILY
Qty: 10 TABLET | Refills: 0 | Status: SHIPPED | OUTPATIENT
Start: 2019-11-27 | End: 2019-12-02

## 2019-11-27 RX ORDER — SULFAMETHOXAZOLE AND TRIMETHOPRIM 800; 160 MG/1; MG/1
1 TABLET ORAL 2 TIMES DAILY
Qty: 10 TABLET | Refills: 0 | Status: SHIPPED | OUTPATIENT
Start: 2019-11-27 | End: 2019-11-27 | Stop reason: SDUPTHER

## 2019-11-27 RX ORDER — PROPOFOL 10 MG/ML
INJECTION, EMULSION INTRAVENOUS PRN
Status: DISCONTINUED | OUTPATIENT
Start: 2019-11-27 | End: 2019-11-27 | Stop reason: SDUPTHER

## 2019-11-27 RX ORDER — MIDAZOLAM HYDROCHLORIDE 1 MG/ML
1 INJECTION INTRAMUSCULAR; INTRAVENOUS EVERY 10 MIN PRN
Status: DISCONTINUED | OUTPATIENT
Start: 2019-11-27 | End: 2019-11-27 | Stop reason: HOSPADM

## 2019-11-27 RX ORDER — FENTANYL CITRATE 50 UG/ML
50 INJECTION, SOLUTION INTRAMUSCULAR; INTRAVENOUS EVERY 5 MIN PRN
Status: DISCONTINUED | OUTPATIENT
Start: 2019-11-27 | End: 2019-11-27 | Stop reason: HOSPADM

## 2019-11-27 RX ORDER — CIPROFLOXACIN 2 MG/ML
400 INJECTION, SOLUTION INTRAVENOUS
Status: COMPLETED | OUTPATIENT
Start: 2019-11-27 | End: 2019-11-27

## 2019-11-27 RX ORDER — LIDOCAINE HYDROCHLORIDE 10 MG/ML
1 INJECTION, SOLUTION EPIDURAL; INFILTRATION; INTRACAUDAL; PERINEURAL
Status: COMPLETED | OUTPATIENT
Start: 2019-11-27 | End: 2019-11-27

## 2019-11-27 RX ORDER — FENTANYL CITRATE 50 UG/ML
25 INJECTION, SOLUTION INTRAMUSCULAR; INTRAVENOUS EVERY 5 MIN PRN
Status: DISCONTINUED | OUTPATIENT
Start: 2019-11-27 | End: 2019-11-27 | Stop reason: HOSPADM

## 2019-11-27 RX ORDER — TRAMADOL HYDROCHLORIDE 50 MG/1
50 TABLET ORAL EVERY 6 HOURS PRN
Qty: 15 TABLET | Refills: 0 | Status: SHIPPED | OUTPATIENT
Start: 2019-11-27 | End: 2019-12-01

## 2019-11-27 RX ORDER — MAGNESIUM HYDROXIDE 1200 MG/15ML
LIQUID ORAL CONTINUOUS PRN
Status: COMPLETED | OUTPATIENT
Start: 2019-11-27 | End: 2019-11-27

## 2019-11-27 RX ORDER — GENTAMICIN SULFATE 40 MG/ML
INJECTION, SOLUTION INTRAMUSCULAR; INTRAVENOUS PRN
Status: DISCONTINUED | OUTPATIENT
Start: 2019-11-27 | End: 2019-11-27 | Stop reason: SDUPTHER

## 2019-11-27 RX ADMIN — LIDOCAINE HYDROCHLORIDE 50 MG: 10 INJECTION, SOLUTION EPIDURAL; INFILTRATION; INTRACAUDAL at 15:51

## 2019-11-27 RX ADMIN — SODIUM CHLORIDE, POTASSIUM CHLORIDE, SODIUM LACTATE AND CALCIUM CHLORIDE: 600; 310; 30; 20 INJECTION, SOLUTION INTRAVENOUS at 15:29

## 2019-11-27 RX ADMIN — KETAMINE HYDROCHLORIDE 30 MG: 100 INJECTION, SOLUTION, CONCENTRATE INTRAMUSCULAR; INTRAVENOUS at 16:01

## 2019-11-27 RX ADMIN — SODIUM CHLORIDE, POTASSIUM CHLORIDE, SODIUM LACTATE AND CALCIUM CHLORIDE: 600; 310; 30; 20 INJECTION, SOLUTION INTRAVENOUS at 15:44

## 2019-11-27 RX ADMIN — CIPROFLOXACIN 400 MG: 2 INJECTION, SOLUTION INTRAVENOUS at 16:01

## 2019-11-27 RX ADMIN — PROPOFOL 300 MG: 10 INJECTION, EMULSION INTRAVENOUS at 15:52

## 2019-11-27 RX ADMIN — GENTAMICIN SULFATE 80 MG: 40 INJECTION, SOLUTION INTRAMUSCULAR; INTRAVENOUS at 15:55

## 2019-11-27 ASSESSMENT — PULMONARY FUNCTION TESTS
PIF_VALUE: 15
PIF_VALUE: 16
PIF_VALUE: 15
PIF_VALUE: 15
PIF_VALUE: 9
PIF_VALUE: 15
PIF_VALUE: 5
PIF_VALUE: 18
PIF_VALUE: 18
PIF_VALUE: 15
PIF_VALUE: 18
PIF_VALUE: 29
PIF_VALUE: 0
PIF_VALUE: 15
PIF_VALUE: 9
PIF_VALUE: 15
PIF_VALUE: 16
PIF_VALUE: 4
PIF_VALUE: 15
PIF_VALUE: 0
PIF_VALUE: 25
PIF_VALUE: 24
PIF_VALUE: 15
PIF_VALUE: 16
PIF_VALUE: 15
PIF_VALUE: 19
PIF_VALUE: 15
PIF_VALUE: 24
PIF_VALUE: 15
PIF_VALUE: 18
PIF_VALUE: 15
PIF_VALUE: 19
PIF_VALUE: 22
PIF_VALUE: 9
PIF_VALUE: 18
PIF_VALUE: 3
PIF_VALUE: 1
PIF_VALUE: 16
PIF_VALUE: 18
PIF_VALUE: 0
PIF_VALUE: 3
PIF_VALUE: 18
PIF_VALUE: 18
PIF_VALUE: 15
PIF_VALUE: 19
PIF_VALUE: 15
PIF_VALUE: 15
PIF_VALUE: 18
PIF_VALUE: 18
PIF_VALUE: 16
PIF_VALUE: 15
PIF_VALUE: 16
PIF_VALUE: 15
PIF_VALUE: 13
PIF_VALUE: 3
PIF_VALUE: 6
PIF_VALUE: 15
PIF_VALUE: 1
PIF_VALUE: 4
PIF_VALUE: 23
PIF_VALUE: 1

## 2019-11-27 ASSESSMENT — PAIN SCALES - GENERAL
PAINLEVEL_OUTOF10: 0

## 2019-11-27 ASSESSMENT — PAIN - FUNCTIONAL ASSESSMENT: PAIN_FUNCTIONAL_ASSESSMENT: 0-10

## 2019-12-21 ENCOUNTER — OFFICE VISIT (OUTPATIENT)
Dept: FAMILY MEDICINE CLINIC | Age: 49
End: 2019-12-21
Payer: COMMERCIAL

## 2019-12-21 VITALS
HEART RATE: 89 BPM | DIASTOLIC BLOOD PRESSURE: 85 MMHG | RESPIRATION RATE: 18 BRPM | WEIGHT: 284 LBS | HEIGHT: 70 IN | TEMPERATURE: 99.5 F | SYSTOLIC BLOOD PRESSURE: 147 MMHG | OXYGEN SATURATION: 97 % | BODY MASS INDEX: 40.66 KG/M2

## 2019-12-21 DIAGNOSIS — J40 BRONCHITIS: ICD-10-CM

## 2019-12-21 DIAGNOSIS — B96.89 ACUTE BACTERIAL SINUSITIS: Primary | ICD-10-CM

## 2019-12-21 DIAGNOSIS — J01.90 ACUTE BACTERIAL SINUSITIS: Primary | ICD-10-CM

## 2019-12-21 PROCEDURE — 99213 OFFICE O/P EST LOW 20 MIN: CPT | Performed by: NURSE PRACTITIONER

## 2019-12-21 RX ORDER — AZITHROMYCIN 250 MG/1
TABLET, FILM COATED ORAL
Qty: 1 PACKET | Refills: 0 | Status: SHIPPED | OUTPATIENT
Start: 2019-12-21 | End: 2020-03-12

## 2019-12-21 RX ORDER — BENZONATATE 200 MG/1
200 CAPSULE ORAL
COMMUNITY
Start: 2019-12-17 | End: 2019-12-21 | Stop reason: SDUPTHER

## 2019-12-21 RX ORDER — FLUTICASONE PROPIONATE 50 MCG
2 SPRAY, SUSPENSION (ML) NASAL DAILY
Qty: 1 BOTTLE | Refills: 0 | Status: SHIPPED | OUTPATIENT
Start: 2019-12-21 | End: 2020-03-12

## 2019-12-21 RX ORDER — PIOGLITAZONEHYDROCHLORIDE 45 MG/1
45 TABLET ORAL DAILY
COMMUNITY

## 2019-12-21 RX ORDER — BENZONATATE 200 MG/1
200 CAPSULE ORAL 3 TIMES DAILY PRN
Qty: 30 CAPSULE | Refills: 0 | Status: SHIPPED | OUTPATIENT
Start: 2019-12-21 | End: 2019-12-31

## 2019-12-21 RX ORDER — ALBUTEROL SULFATE 90 UG/1
2 AEROSOL, METERED RESPIRATORY (INHALATION) EVERY 4 HOURS PRN
Qty: 1 INHALER | Refills: 3 | Status: SHIPPED | OUTPATIENT
Start: 2019-12-21 | End: 2021-02-03

## 2019-12-21 ASSESSMENT — ENCOUNTER SYMPTOMS
WHEEZING: 1
HOARSE VOICE: 0
HEARTBURN: 0
RHINORRHEA: 0
HEMOPTYSIS: 0
SHORTNESS OF BREATH: 0
SORE THROAT: 1
SINUS PRESSURE: 1
COUGH: 1
SWOLLEN GLANDS: 0

## 2020-10-10 ENCOUNTER — APPOINTMENT (OUTPATIENT)
Dept: GENERAL RADIOLOGY | Age: 50
End: 2020-10-10
Payer: COMMERCIAL

## 2020-10-10 ENCOUNTER — HOSPITAL ENCOUNTER (EMERGENCY)
Age: 50
Discharge: HOME | End: 2020-10-10
Attending: EMERGENCY MEDICINE
Payer: COMMERCIAL

## 2020-10-10 VITALS
SYSTOLIC BLOOD PRESSURE: 177 MMHG | BODY MASS INDEX: 41.52 KG/M2 | OXYGEN SATURATION: 97 % | HEIGHT: 70 IN | RESPIRATION RATE: 16 BRPM | HEART RATE: 108 BPM | WEIGHT: 290 LBS | DIASTOLIC BLOOD PRESSURE: 98 MMHG | TEMPERATURE: 99 F

## 2020-10-10 PROCEDURE — 73130 X-RAY EXAM OF HAND: CPT

## 2020-10-10 PROCEDURE — 73110 X-RAY EXAM OF WRIST: CPT

## 2020-10-10 PROCEDURE — 99283 EMERGENCY DEPT VISIT LOW MDM: CPT

## 2020-10-10 PROCEDURE — 6370000000 HC RX 637 (ALT 250 FOR IP): Performed by: STUDENT IN AN ORGANIZED HEALTH CARE EDUCATION/TRAINING PROGRAM

## 2020-10-10 RX ORDER — LIDOCAINE 4 G/G
1 PATCH TOPICAL DAILY
Status: DISCONTINUED | OUTPATIENT
Start: 2020-10-10 | End: 2020-10-10 | Stop reason: HOSPADM

## 2020-10-10 RX ORDER — LIDOCAINE 4 G/G
1 PATCH TOPICAL DAILY
Qty: 6 PATCH | Refills: 0 | Status: SHIPPED | OUTPATIENT
Start: 2020-10-10 | End: 2020-10-16

## 2020-10-10 ASSESSMENT — PAIN DESCRIPTION - DESCRIPTORS: DESCRIPTORS: ACHING

## 2020-10-10 ASSESSMENT — PAIN DESCRIPTION - PROGRESSION: CLINICAL_PROGRESSION: GRADUALLY WORSENING

## 2020-10-10 ASSESSMENT — PAIN DESCRIPTION - ONSET: ONSET: SUDDEN

## 2020-10-10 ASSESSMENT — PAIN DESCRIPTION - ORIENTATION: ORIENTATION: RIGHT

## 2020-10-10 ASSESSMENT — PAIN DESCRIPTION - LOCATION: LOCATION: ARM

## 2020-10-10 ASSESSMENT — PAIN SCALES - GENERAL: PAINLEVEL_OUTOF10: 7

## 2020-10-10 ASSESSMENT — PAIN DESCRIPTION - FREQUENCY: FREQUENCY: CONTINUOUS

## 2020-10-10 ASSESSMENT — PAIN DESCRIPTION - PAIN TYPE: TYPE: ACUTE PAIN

## 2020-10-10 NOTE — ED NOTES
Patient sent to [facility] drug screen person called in.      Melida Moody RN  10/10/20 2329

## 2020-10-10 NOTE — ED PROVIDER NOTES
[facility]     Emergency Department     Faculty Note/ Attestation      Pt Name: Dayana Gore                                       MRN: [id number]  [place] 1970  Date of evaluation: 10/10/2020    Patients PCP:    Grisel Domingo MD    Attestation  I performed a history and physical examination of the patient and discussed management with the resident. I reviewed the residents note and agree with the documented findings and plan of care. Any areas of disagreement are noted on the chart. I was personally present for the key portions of any procedures. I have documented in the chart those procedures where I was not present during the key portions. I have reviewed the emergency nurses triage note. I agree with the chief complaint, past medical history, past surgical history, allergies, medications, social and family history as documented unless otherwise noted below. For Physician Assistant/ Nurse Practitioner cases/documentation I have personally evaluated this patient and have completed at least one if not all key elements of the E/M (history, physical exam, and MDM). Additional findings are as noted. Initial Screens:             Vitals:    Vitals:    10/10/20 1713   BP: (!) 177/98   Pulse: 108   Resp: 16   Temp: 99 °F (37.2 °C)   TempSrc: Oral   SpO2: 97%   Weight: 290 lb (131.5 kg)   Height: 5' 10\" (1.778 m)       CHIEF COMPLAINT       Chief Complaint   Patient presents with    Arm Pain       Wrist pain in the right wrist after door swung back into hand at work. DIAGNOSTIC RESULTS     RADIOLOGY:   No orders to display       LABS:  Labs Reviewed - No data to display    EMERGENCY DEPARTMENT COURSE:     -------------------------  BP: (!) 177/98, Temp: 99 °F (37.2 °C), Pulse: 108, Resp: 16  Physical Exam __  Constitutional:  oriented to person, place, and time. appears well-developed and well-nourished.    HENT: no facial swelling or edema  Head: Normocephalic and atraumatic. Eyes: Right eye exhibits no discharge. Left eye exhibits no discharge. No scleral icterus. Neck: No JVD present. No tracheal deviation present. Cardiovascular: pulses present in extremities  Pulmonary/Chest: Effort normal. No respiratory distress. Musculoskeletal: Swelling and decreased ROM of the wrist on the right max tenderness around the wris on the right no decreased PMS distally. Neurological: they are alert and oriented to person, place, and time. Skin: Skin is warm and dry. Psychiatric: has a normal mood and affect. behavior is normal.      Comments  X ray for possible fracure given allergies and intolerances will provide option for topical pain medications. Cohen DO, RDMS.   Attending Emergency Physician          Nelson Barton DO  10/10/20 9916

## 2020-10-10 NOTE — ED PROVIDER NOTES
[facility]  ED  Emergency Department Encounter  Emergency Medicine Resident     Pt Name: Sara Garvin  RNL:[id number]  [place] 1970  Date of evaluation: 10/10/20  PCP:  Gurpreet Diaz MD    [address] [place] [address]       Chief Complaint   Patient presents with    Arm Pain     HISTORY OF PRESENT ILLNESS  (Location/Symptom, Timing/Onset, Context/Setting, Quality, Duration, ModifyingFactors, Severity.)      Sara Garvin is a 53 y.o. male who presents for evaluation of right arm pain x2 days. Patient is a  and was opening the back of the truck when the wind blew the door open into his hand, hyperextending his wrist.  Immediate onset of pain that has persisted for the past 2 days. Currently 7/10. Is now also developed swelling of his right hand. It is difficult to make a fist due to pain. No numbness, tingling, weakness. He did not hit his head or lose consciousness. Not on anticoagulation. PAST MEDICAL / SURGICAL / SOCIAL /FAMILY HISTORY      has a past medical history of Arthritis, Diabetes mellitus ([address] Difficult intravenous access, ED (erectile dysfunction), Gout, Hyperlipidemia, Hypertension, Kidney stone, Kidney stone, and Prolonged emergence from general anesthesia. No other pertinent PMH on review with patient/guardian. has a past surgical history that includes Tonsillectomy (1976); Cystoscopy (Left, 1/20/2019); Lithotripsy (Left, 02/07/2019); cysto/uretero/pyeloscopy, calculus tx (Left, 2/7/2019); knee surgery (Right, 1983); lipoma resection (Right); Cystoscopy (N/A, 02/12/2019); CYSTOSCOPY INSERTION / REMOVAL STENT / STONE (Left, 2/12/2019); Carpal tunnel release (Bilateral); cystourethroscopy (11/19/2019); Lithotripsy (Right, 11/19/2019); Ureter stent placement (Right, 11/19/2019); cysto/uretero/pyeloscopy, calculus tx (Right, 11/19/2019);  Cystoscopy (Right, 11/27/2019); cysto/uretero/pyeloscopy, calculus tx (Right, 11/27/2019); and Knee arthroscopy (Right, 1983). No other pertinent PSH on review with patient/guardian. Social History     Socioeconomic History    Marital status:      Spouse name: Not on file    Number of children: Not on file    Years of education: Not on file    Highest education level: Not on file   Occupational History    Not on file   Social Needs    Financial resource strain: Not on file    Food insecurity     Worry: Not on file     Inability: Not on file    Transportation needs     Medical: Not on file     Non-medical: Not on file   Tobacco Use    Smoking status: Never Smoker    Smokeless tobacco: Never Used   Substance and Sexual Activity    Alcohol use: Not Currently    Drug use: No    Sexual activity: Not on file   Lifestyle    Physical activity     Days per week: Not on file     Minutes per session: Not on file    Stress: Not on file   Relationships    Social connections     Talks on phone: Not on file     Gets together: Not on file     Attends Jehovah's witness service: Not on file     Active member of club or organization: Not on file     Attends meetings of clubs or organizations: Not on file     Relationship status: Not on file    Intimate partner violence     Fear of current or ex partner: Not on file     Emotionally abused: Not on file     Physically abused: Not on file     Forced sexual activity: Not on file   Other Topics Concern    Not on file   Social History Narrative    Not on file       I counseled the patient against using tobacco products. Family History   Problem Relation Age of Onset    Cancer Mother         LUNG    High Blood Pressure Father     Other Father         CIRRHOSIS    Alcohol Abuse Father     Asthma Brother      No other pertinent FamHx on review with patient/guardian. Allergies:  Penicillins and Percocet [oxycodone-acetaminophen]    Home Medications:  Prior to Admission medications    Medication Sig Start Date End Date Taking?  Authorizing Provider   lidocaine 4 % external patch Place 1 patch onto the skin daily for 6 days 10/10/20 10/16/20 Yes Luna Le DO   pioglitazone (ACTOS) 45 MG tablet Take 45 mg by mouth daily    Yes Historical Provider, MD   metFORMIN (GLUCOPHAGE) 1000 MG tablet Take 1,000 mg by mouth 2 times daily (with meals)   Yes Historical Provider, MD   allopurinol (ZYLOPRIM) 100 MG tablet Take 100 mg by mouth daily   Yes Historical Provider, MD   Icosapent Ethyl (VASCEPA) 1 g CAPS capsule Take 2 capsules by mouth 2 times daily   Yes Historical Provider, MD   glipiZIDE (GLUCOTROL XL) 10 MG extended release tablet Take 10 mg by mouth 2 times daily   Yes Historical Provider, MD   sildenafil (VIAGRA) 50 MG tablet Take 50 mg by mouth as needed for Erectile Dysfunction   Yes Historical Provider, MD   simvastatin (ZOCOR) 20 MG tablet Take 20 mg by mouth nightly. Yes Historical Provider, MD   carvedilol (COREG) 6.25 MG tablet Take 6.25 mg by mouth daily    Yes Historical Provider, MD   Semaglutide (OZEMPIC, 1 MG/DOSE, SC) Inject 1 mg into the skin once a week TAKES ON SUNDAYS    Historical Provider, MD   albuterol sulfate HFA (PROAIR HFA) 108 (90 Base) MCG/ACT inhaler Inhale 2 puffs into the lungs every 4 hours as needed for Wheezing 12/21/19   RIKKI Reyes CNP   potassium citrate (UROCIT-K) 10 MEQ (1080 MG) extended release tablet Take 1 tablet by mouth 3 times daily (with meals)  Patient taking differently: Take 10 mEq by mouth 3 times daily (with meals) 03/12/2020 NOT TAKING 11/19/19   Tania Bloch, MD   aspirin 81 MG tablet Take 81 mg by mouth daily STOPPED TAKING 02/09/2020. Historical Provider, MD       REVIEW OF SYSTEMS    (2-9 systems for level 4, 10 ormore for level 5)      Review of Systems   Constitutional: Negative for fever. Musculoskeletal:        Right wrist pain. Skin: Negative for wound. Allergic/Immunologic: Negative for immunocompromised state. Hematological: Does not bruise/bleed easily.      PHYSICAL EXAM   (up to 7 for wrist 2 days ago. Patient afebrile. Exam patient has tenderness over the right ulnar carpal area. He also has diffuse swelling of the hand and an area of swelling on the dorsum of the distal forearm. Neurovascularly intact. Exam is not concerning for scaphoid fracture or compartment syndrome. Will obtain x-ray to evaluate for bony injury. Limited options for pain control due to patient's history of EDENILSON with NSAIDs and anaphylaxis with Percocet. Lidocaine patch ordered. XR did not show any bony abnormalities. Patient advised to ice and I prescribed lidocaine patches for analgesia. Follow-up with occupational health on Monday. No use of right hand until evaluation by them. Work note was provided as patient is right-handed and drives with this hand. I discussed signs and symptoms that would require reevaluation in the ED. The patient expressed understanding and agreement with plan. All questions answered. Patient/Guardian requesting discharge. Patient/Guardian was given written and verbal instructions prior to discharge. Patient/Guardian understood and agreed. Patient/Guardian had no further questions. RADIOLOGY:  XR WRIST RIGHT (MIN 3 VIEWS)   Final Result   1. No acute osseous abnormality of the right hand. 2. No acute osseous abnormality of the right wrist.   3. Dorsal soft tissue swelling. XR HAND RIGHT (MIN 3 VIEWS)   Final Result   1. No acute osseous abnormality of the right hand. 2. No acute osseous abnormality of the right wrist.   3. Dorsal soft tissue swelling. FINAL IMPRESSION      1.  Right arm pain          DISPOSITION / PLAN     DISPOSITION Decision To Discharge 10/10/2020 06:18:09 PM      PATIENT REFERREDTO:  Keon Brownlee MD  [address] [place] [address]  [phone]    Schedule an appointment as soon as possible for a visit         DISCHARGE MEDICATIONS:  New Prescriptions    LIDOCAINE 4 % EXTERNAL PATCH    Place 1 patch onto the skin daily for 6 lydia Clark DO  PGY 1  Resident Physician Emergency Medicine  10/10/20 6:44 PM    (Please note that portions of this note were completed with a voice recognition program.Efforts were made to edit the dictations but occasionally words are mis-transcribed.)       Chano Yu DO  Resident  10/10/20 7653

## 2020-10-10 NOTE — ED NOTES
Ace wrap applied to right wrist/hand.  Distal motor, sensory, and capillary refill intact     Fawad Machado, [facility]  10/10/20 1928

## 2020-12-31 ENCOUNTER — APPOINTMENT (OUTPATIENT)
Dept: CT IMAGING | Age: 50
End: 2020-12-31
Payer: COMMERCIAL

## 2020-12-31 ENCOUNTER — HOSPITAL ENCOUNTER (OUTPATIENT)
Age: 50
Setting detail: OBSERVATION
Discharge: HOME | End: 2021-01-02
Attending: EMERGENCY MEDICINE | Admitting: HOSPITALIST
Payer: COMMERCIAL

## 2020-12-31 DIAGNOSIS — G51.0: ICD-10-CM

## 2020-12-31 DIAGNOSIS — R29.90 STROKE-LIKE SYMPTOMS: Primary | ICD-10-CM

## 2020-12-31 LAB
% CKMB: 2.7 % (ref 0–3.5)
ABSOLUTE EOS #: 0.39 K/UL (ref 0–0.44)
ABSOLUTE IMMATURE GRANULOCYTE: 0.04 K/UL (ref 0–0.3)
ABSOLUTE LYMPH #: 1.64 K/UL (ref 1.1–3.7)
ABSOLUTE MONO #: 0.72 K/UL (ref 0.1–1.2)
ANION GAP SERPL CALCULATED.3IONS-SCNC: 10 MMOL/L (ref 9–17)
BASOPHILS # BLD: 1 % (ref 0–2)
BASOPHILS ABSOLUTE: 0.07 K/UL (ref 0–0.2)
BUN BLDV-MCNC: 22 MG/DL (ref 6–20)
BUN/CREAT BLD: 16 (ref 9–20)
CALCIUM SERPL-MCNC: 9.3 MG/DL (ref 8.6–10.4)
CHLORIDE BLD-SCNC: 103 MMOL/L (ref 98–107)
CK MB: 3.8 NG/ML
CKMB INTERPRETATION: NORMAL
CO2: 28 MMOL/L (ref 20–31)
CREAT SERPL-MCNC: 1.4 MG/DL (ref 0.7–1.2)
DIFFERENTIAL TYPE: ABNORMAL
EOSINOPHILS RELATIVE PERCENT: 4 % (ref 1–4)
GFR AFRICAN AMERICAN: >60 ML/MIN
GFR NON-AFRICAN AMERICAN: 54 ML/MIN
GFR SERPL CREATININE-BSD FRML MDRD: ABNORMAL ML/MIN/{1.73_M2}
GFR SERPL CREATININE-BSD FRML MDRD: ABNORMAL ML/MIN/{1.73_M2}
GLUCOSE BLD-MCNC: 138 MG/DL (ref 75–110)
GLUCOSE BLD-MCNC: 171 MG/DL (ref 70–99)
HCT VFR BLD CALC: 45.4 % (ref 40.7–50.3)
HEMOGLOBIN: 14.4 G/DL (ref 13–17)
IMMATURE GRANULOCYTES: 1 %
INR BLD: 1.1
LYMPHOCYTES # BLD: 19 % (ref 24–43)
MAGNESIUM: 1.8 MG/DL (ref 1.6–2.6)
MCH RBC QN AUTO: 27.6 PG (ref 25.2–33.5)
MCHC RBC AUTO-ENTMCNC: 31.7 G/DL (ref 28.4–34.8)
MCV RBC AUTO: 87 FL (ref 82.6–102.9)
MONOCYTES # BLD: 8 % (ref 3–12)
MYOGLOBIN: 71 NG/ML (ref 28–72)
NRBC AUTOMATED: 0 PER 100 WBC
PARTIAL THROMBOPLASTIN TIME: 34.4 SEC (ref 23.9–33.8)
PDW BLD-RTO: 13.5 % (ref 11.8–14.4)
PLATELET # BLD: 210 K/UL (ref 138–453)
PLATELET ESTIMATE: ABNORMAL
PMV BLD AUTO: 9.2 FL (ref 8.1–13.5)
POTASSIUM SERPL-SCNC: 4 MMOL/L (ref 3.7–5.3)
PROTHROMBIN TIME: 13.7 SEC (ref 11.5–14.2)
RBC # BLD: 5.22 M/UL (ref 4.21–5.77)
RBC # BLD: ABNORMAL 10*6/UL
SEG NEUTROPHILS: 67 % (ref 36–65)
SEGMENTED NEUTROPHILS ABSOLUTE COUNT: 5.98 K/UL (ref 1.5–8.1)
SODIUM BLD-SCNC: 141 MMOL/L (ref 135–144)
TOTAL CK: 143 U/L (ref 39–308)
TROPONIN INTERP: NORMAL
TROPONIN T: NORMAL NG/ML
TROPONIN, HIGH SENSITIVITY: 20 NG/L (ref 0–22)
WBC # BLD: 8.8 K/UL (ref 3.5–11.3)
WBC # BLD: ABNORMAL 10*3/UL

## 2020-12-31 PROCEDURE — 82947 ASSAY GLUCOSE BLOOD QUANT: CPT

## 2020-12-31 PROCEDURE — 85610 PROTHROMBIN TIME: CPT

## 2020-12-31 PROCEDURE — 82550 ASSAY OF CK (CPK): CPT

## 2020-12-31 PROCEDURE — 99448 NTRPROF PH1/NTRNET/EHR 21-30: CPT | Performed by: PSYCHIATRY & NEUROLOGY

## 2020-12-31 PROCEDURE — 96365 THER/PROPH/DIAG IV INF INIT: CPT

## 2020-12-31 PROCEDURE — 6360000002 HC RX W HCPCS: Performed by: EMERGENCY MEDICINE

## 2020-12-31 PROCEDURE — 93005 ELECTROCARDIOGRAM TRACING: CPT

## 2020-12-31 PROCEDURE — 99284 EMERGENCY DEPT VISIT MOD MDM: CPT

## 2020-12-31 PROCEDURE — 80048 BASIC METABOLIC PNL TOTAL CA: CPT

## 2020-12-31 PROCEDURE — 83735 ASSAY OF MAGNESIUM: CPT

## 2020-12-31 PROCEDURE — 83874 ASSAY OF MYOGLOBIN: CPT

## 2020-12-31 PROCEDURE — 82553 CREATINE MB FRACTION: CPT

## 2020-12-31 PROCEDURE — 85025 COMPLETE CBC W/AUTO DIFF WBC: CPT

## 2020-12-31 PROCEDURE — 84484 ASSAY OF TROPONIN QUANT: CPT

## 2020-12-31 PROCEDURE — 96375 TX/PRO/DX INJ NEW DRUG ADDON: CPT

## 2020-12-31 PROCEDURE — 2580000003 HC RX 258: Performed by: EMERGENCY MEDICINE

## 2020-12-31 PROCEDURE — 70450 CT HEAD/BRAIN W/O DYE: CPT

## 2020-12-31 PROCEDURE — 96372 THER/PROPH/DIAG INJ SC/IM: CPT

## 2020-12-31 PROCEDURE — 85730 THROMBOPLASTIN TIME PARTIAL: CPT

## 2020-12-31 RX ORDER — DEXAMETHASONE SODIUM PHOSPHATE 10 MG/ML
10 INJECTION, SOLUTION INTRAMUSCULAR; INTRAVENOUS ONCE
Status: COMPLETED | OUTPATIENT
Start: 2020-12-31 | End: 2020-12-31

## 2020-12-31 RX ADMIN — ACYCLOVIR SODIUM 500 MG: 50 INJECTION, SOLUTION INTRAVENOUS at 22:55

## 2020-12-31 RX ADMIN — DEXAMETHASONE SODIUM PHOSPHATE 10 MG: 10 INJECTION, SOLUTION INTRAMUSCULAR; INTRAVENOUS at 22:56

## 2021-01-01 ENCOUNTER — APPOINTMENT (OUTPATIENT)
Dept: MRI IMAGING | Age: 51
End: 2021-01-01
Payer: COMMERCIAL

## 2021-01-01 PROBLEM — D57.1 ACUTE CVA (CEREBROVASCULAR ACCIDENT) DUE TO SICKLE-CELL DISEASE (HCC): Status: ACTIVE | Noted: 2021-01-01

## 2021-01-01 PROBLEM — I63.9 ACUTE CVA (CEREBROVASCULAR ACCIDENT) DUE TO SICKLE-CELL DISEASE (HCC): Status: ACTIVE | Noted: 2021-01-01

## 2021-01-01 LAB
ABSOLUTE EOS #: 0.03 K/UL (ref 0–0.44)
ABSOLUTE IMMATURE GRANULOCYTE: 0.06 K/UL (ref 0–0.3)
ABSOLUTE LYMPH #: 0.75 K/UL (ref 1.1–3.7)
ABSOLUTE MONO #: 0.09 K/UL (ref 0.1–1.2)
ANION GAP SERPL CALCULATED.3IONS-SCNC: 9 MMOL/L (ref 9–17)
BASOPHILS # BLD: 0 % (ref 0–2)
BASOPHILS ABSOLUTE: 0.04 K/UL (ref 0–0.2)
BUN BLDV-MCNC: 22 MG/DL (ref 6–20)
BUN/CREAT BLD: 16 (ref 9–20)
CALCIUM SERPL-MCNC: 9.1 MG/DL (ref 8.6–10.4)
CHLORIDE BLD-SCNC: 104 MMOL/L (ref 98–107)
CO2: 24 MMOL/L (ref 20–31)
CREAT SERPL-MCNC: 1.4 MG/DL (ref 0.7–1.2)
DIFFERENTIAL TYPE: ABNORMAL
EOSINOPHILS RELATIVE PERCENT: 0 % (ref 1–4)
ESTIMATED AVERAGE GLUCOSE: 157 MG/DL
GFR AFRICAN AMERICAN: >60 ML/MIN
GFR NON-AFRICAN AMERICAN: 54 ML/MIN
GFR SERPL CREATININE-BSD FRML MDRD: ABNORMAL ML/MIN/{1.73_M2}
GFR SERPL CREATININE-BSD FRML MDRD: ABNORMAL ML/MIN/{1.73_M2}
GLUCOSE BLD-MCNC: 267 MG/DL (ref 75–110)
GLUCOSE BLD-MCNC: 281 MG/DL (ref 75–110)
GLUCOSE BLD-MCNC: 316 MG/DL (ref 70–99)
GLUCOSE BLD-MCNC: 322 MG/DL (ref 75–110)
HBA1C MFR BLD: 7.1 % (ref 4–6)
HCT VFR BLD CALC: 43.6 % (ref 40.7–50.3)
HEMOGLOBIN: 13.8 G/DL (ref 13–17)
IMMATURE GRANULOCYTES: 1 %
LYMPHOCYTES # BLD: 8 % (ref 24–43)
MCH RBC QN AUTO: 27.3 PG (ref 25.2–33.5)
MCHC RBC AUTO-ENTMCNC: 31.7 G/DL (ref 28.4–34.8)
MCV RBC AUTO: 86.3 FL (ref 82.6–102.9)
MONOCYTES # BLD: 1 % (ref 3–12)
NRBC AUTOMATED: 0 PER 100 WBC
PDW BLD-RTO: 13.3 % (ref 11.8–14.4)
PLATELET # BLD: 195 K/UL (ref 138–453)
PLATELET ESTIMATE: ABNORMAL
PMV BLD AUTO: 9.4 FL (ref 8.1–13.5)
POTASSIUM SERPL-SCNC: 4.7 MMOL/L (ref 3.7–5.3)
RBC # BLD: 5.05 M/UL (ref 4.21–5.77)
RBC # BLD: ABNORMAL 10*6/UL
SEG NEUTROPHILS: 90 % (ref 36–65)
SEGMENTED NEUTROPHILS ABSOLUTE COUNT: 8.8 K/UL (ref 1.5–8.1)
SODIUM BLD-SCNC: 137 MMOL/L (ref 135–144)
TROPONIN INTERP: NORMAL
TROPONIN T: NORMAL NG/ML
TROPONIN, HIGH SENSITIVITY: 17 NG/L (ref 0–22)
TROPONIN, HIGH SENSITIVITY: 17 NG/L (ref 0–22)
TROPONIN, HIGH SENSITIVITY: 18 NG/L (ref 0–22)
WBC # BLD: 9.8 K/UL (ref 3.5–11.3)
WBC # BLD: ABNORMAL 10*3/UL

## 2021-01-01 PROCEDURE — 2580000003 HC RX 258: Performed by: HOSPITALIST

## 2021-01-01 PROCEDURE — 84484 ASSAY OF TROPONIN QUANT: CPT

## 2021-01-01 PROCEDURE — 80061 LIPID PANEL: CPT

## 2021-01-01 PROCEDURE — 83036 HEMOGLOBIN GLYCOSYLATED A1C: CPT

## 2021-01-01 PROCEDURE — 6360000002 HC RX W HCPCS: Performed by: HOSPITALIST

## 2021-01-01 PROCEDURE — G0378 HOSPITAL OBSERVATION PER HR: HCPCS

## 2021-01-01 PROCEDURE — 80048 BASIC METABOLIC PNL TOTAL CA: CPT

## 2021-01-01 PROCEDURE — 70551 MRI BRAIN STEM W/O DYE: CPT

## 2021-01-01 PROCEDURE — 6370000000 HC RX 637 (ALT 250 FOR IP): Performed by: HOSPITALIST

## 2021-01-01 PROCEDURE — 96375 TX/PRO/DX INJ NEW DRUG ADDON: CPT

## 2021-01-01 PROCEDURE — 85025 COMPLETE CBC W/AUTO DIFF WBC: CPT

## 2021-01-01 PROCEDURE — 96372 THER/PROPH/DIAG INJ SC/IM: CPT

## 2021-01-01 PROCEDURE — 82947 ASSAY GLUCOSE BLOOD QUANT: CPT

## 2021-01-01 PROCEDURE — 6370000000 HC RX 637 (ALT 250 FOR IP): Performed by: PSYCHIATRY & NEUROLOGY

## 2021-01-01 PROCEDURE — 99220 PR INITIAL OBSERVATION CARE/DAY 70 MINUTES: CPT | Performed by: PSYCHIATRY & NEUROLOGY

## 2021-01-01 PROCEDURE — 36415 COLL VENOUS BLD VENIPUNCTURE: CPT

## 2021-01-01 RX ORDER — MAGNESIUM SULFATE 1 G/100ML
1 INJECTION INTRAVENOUS PRN
Status: DISCONTINUED | OUTPATIENT
Start: 2021-01-01 | End: 2021-01-01 | Stop reason: SDUPTHER

## 2021-01-01 RX ORDER — MAGNESIUM SULFATE 1 G/100ML
1 INJECTION INTRAVENOUS PRN
Status: DISCONTINUED | OUTPATIENT
Start: 2021-01-01 | End: 2021-01-02 | Stop reason: HOSPADM

## 2021-01-01 RX ORDER — ASPIRIN 81 MG/1
81 TABLET, CHEWABLE ORAL DAILY
Status: DISCONTINUED | OUTPATIENT
Start: 2021-01-01 | End: 2021-01-02 | Stop reason: HOSPADM

## 2021-01-01 RX ORDER — FAMOTIDINE 20 MG/1
20 TABLET, FILM COATED ORAL 2 TIMES DAILY
Status: DISCONTINUED | OUTPATIENT
Start: 2021-01-01 | End: 2021-01-02 | Stop reason: HOSPADM

## 2021-01-01 RX ORDER — SODIUM CHLORIDE 9 MG/ML
INJECTION, SOLUTION INTRAVENOUS CONTINUOUS
Status: DISCONTINUED | OUTPATIENT
Start: 2021-01-01 | End: 2021-01-02 | Stop reason: HOSPADM

## 2021-01-01 RX ORDER — HEPARIN SODIUM 5000 [USP'U]/ML
5000 INJECTION, SOLUTION INTRAVENOUS; SUBCUTANEOUS 2 TIMES DAILY
Status: DISCONTINUED | OUTPATIENT
Start: 2021-01-01 | End: 2021-01-02 | Stop reason: HOSPADM

## 2021-01-01 RX ORDER — AMLODIPINE BESYLATE 5 MG/1
5 TABLET ORAL DAILY
COMMUNITY

## 2021-01-01 RX ORDER — DEXTROSE MONOHYDRATE 50 MG/ML
100 INJECTION, SOLUTION INTRAVENOUS PRN
Status: DISCONTINUED | OUTPATIENT
Start: 2021-01-01 | End: 2021-01-02 | Stop reason: HOSPADM

## 2021-01-01 RX ORDER — CARVEDILOL 6.25 MG/1
6.25 TABLET ORAL DAILY
Status: DISCONTINUED | OUTPATIENT
Start: 2021-01-01 | End: 2021-01-02 | Stop reason: HOSPADM

## 2021-01-01 RX ORDER — ONDANSETRON 2 MG/ML
4 INJECTION INTRAMUSCULAR; INTRAVENOUS EVERY 6 HOURS PRN
Status: DISCONTINUED | OUTPATIENT
Start: 2021-01-01 | End: 2021-01-02 | Stop reason: HOSPADM

## 2021-01-01 RX ORDER — HYDRALAZINE HYDROCHLORIDE 20 MG/ML
10 INJECTION INTRAMUSCULAR; INTRAVENOUS EVERY 6 HOURS PRN
Status: DISCONTINUED | OUTPATIENT
Start: 2021-01-01 | End: 2021-01-02 | Stop reason: HOSPADM

## 2021-01-01 RX ORDER — PROMETHAZINE HYDROCHLORIDE 25 MG/1
12.5 TABLET ORAL EVERY 6 HOURS PRN
Status: DISCONTINUED | OUTPATIENT
Start: 2021-01-01 | End: 2021-01-02 | Stop reason: HOSPADM

## 2021-01-01 RX ORDER — POTASSIUM CHLORIDE 20 MEQ/1
40 TABLET, EXTENDED RELEASE ORAL PRN
Status: DISCONTINUED | OUTPATIENT
Start: 2021-01-01 | End: 2021-01-02 | Stop reason: HOSPADM

## 2021-01-01 RX ORDER — SODIUM CHLORIDE 0.9 % (FLUSH) 0.9 %
10 SYRINGE (ML) INJECTION EVERY 12 HOURS SCHEDULED
Status: DISCONTINUED | OUTPATIENT
Start: 2021-01-01 | End: 2021-01-02 | Stop reason: HOSPADM

## 2021-01-01 RX ORDER — SENNA PLUS 8.6 MG/1
1 TABLET ORAL 2 TIMES DAILY PRN
Status: DISCONTINUED | OUTPATIENT
Start: 2021-01-01 | End: 2021-01-02 | Stop reason: HOSPADM

## 2021-01-01 RX ORDER — POTASSIUM CHLORIDE 7.45 MG/ML
10 INJECTION INTRAVENOUS PRN
Status: DISCONTINUED | OUTPATIENT
Start: 2021-01-01 | End: 2021-01-02 | Stop reason: HOSPADM

## 2021-01-01 RX ORDER — SODIUM CHLORIDE 0.9 % (FLUSH) 0.9 %
10 SYRINGE (ML) INJECTION PRN
Status: DISCONTINUED | OUTPATIENT
Start: 2021-01-01 | End: 2021-01-02 | Stop reason: HOSPADM

## 2021-01-01 RX ORDER — NICOTINE POLACRILEX 4 MG
15 LOZENGE BUCCAL PRN
Status: DISCONTINUED | OUTPATIENT
Start: 2021-01-01 | End: 2021-01-02 | Stop reason: HOSPADM

## 2021-01-01 RX ORDER — ATORVASTATIN CALCIUM 10 MG/1
10 TABLET, FILM COATED ORAL NIGHTLY
Status: DISCONTINUED | OUTPATIENT
Start: 2021-01-01 | End: 2021-01-02 | Stop reason: HOSPADM

## 2021-01-01 RX ORDER — DEXTROSE MONOHYDRATE 25 G/50ML
12.5 INJECTION, SOLUTION INTRAVENOUS PRN
Status: DISCONTINUED | OUTPATIENT
Start: 2021-01-01 | End: 2021-01-02 | Stop reason: HOSPADM

## 2021-01-01 RX ORDER — PREDNISONE 20 MG/1
80 TABLET ORAL DAILY
Status: DISCONTINUED | OUTPATIENT
Start: 2021-01-01 | End: 2021-01-02 | Stop reason: HOSPADM

## 2021-01-01 RX ADMIN — INSULIN LISPRO 6 UNITS: 100 INJECTION, SOLUTION INTRAVENOUS; SUBCUTANEOUS at 19:05

## 2021-01-01 RX ADMIN — PREDNISONE 80 MG: 20 TABLET ORAL at 11:48

## 2021-01-01 RX ADMIN — HEPARIN SODIUM 5000 UNITS: 5000 INJECTION INTRAVENOUS; SUBCUTANEOUS at 20:06

## 2021-01-01 RX ADMIN — SODIUM CHLORIDE: 9 INJECTION, SOLUTION INTRAVENOUS at 19:05

## 2021-01-01 RX ADMIN — FAMOTIDINE 20 MG: 20 TABLET, FILM COATED ORAL at 20:04

## 2021-01-01 RX ADMIN — CARVEDILOL 6.25 MG: 6.25 TABLET, FILM COATED ORAL at 08:13

## 2021-01-01 RX ADMIN — ASPIRIN 81 MG: 81 TABLET, CHEWABLE ORAL at 11:48

## 2021-01-01 RX ADMIN — FAMOTIDINE 20 MG: 20 TABLET, FILM COATED ORAL at 11:48

## 2021-01-01 RX ADMIN — SODIUM CHLORIDE: 9 INJECTION, SOLUTION INTRAVENOUS at 01:26

## 2021-01-01 RX ADMIN — ATORVASTATIN CALCIUM 10 MG: 10 TABLET, FILM COATED ORAL at 20:04

## 2021-01-01 RX ADMIN — PIOGLITAZONE 45 MG: 15 TABLET ORAL at 08:13

## 2021-01-01 RX ADMIN — HEPARIN SODIUM 5000 UNITS: 5000 INJECTION INTRAVENOUS; SUBCUTANEOUS at 11:48

## 2021-01-01 RX ADMIN — INSULIN LISPRO 4 UNITS: 100 INJECTION, SOLUTION INTRAVENOUS; SUBCUTANEOUS at 22:00

## 2021-01-01 RX ADMIN — HYDRALAZINE HYDROCHLORIDE 10 MG: 20 INJECTION, SOLUTION INTRAMUSCULAR; INTRAVENOUS at 19:32

## 2021-01-01 ASSESSMENT — PAIN SCALES - GENERAL: PAINLEVEL_OUTOF10: 0

## 2021-01-01 NOTE — CONSULTS
[facility]   IN-PATIENT SERVICE      NEUROLOGY CONSULT  NOTE            Date:   1/1/2021  Patient name:  Abhishek Lay  Date of admission:  12/31/2020  YOB: 1970      Chief Complaint:     Chief Complaint   Patient presents with    Headache     headache that began yesterday and ended this morning.  Facial Droop     RIGHT sided facial droop that began at noon today. pt thought it was stress related and thatis why he didn't come in. Pt has no hx of stroke       Reason for Consult:      Right facial droop    History of Present Illness: The patient is a 53 y.o. male who presents with Headache (headache that began yesterday and ended this morning. ) and Facial Droop (RIGHT sided facial droop that began at noon today. pt thought it was stress related and thatis why he didn't come in. Pt has no hx of stroke)  . The patient was seen and examined and the chart was reviewed. Patient states around noon yesterday he noticed the right side of his face was not moving appropriately. He also noticed that the right eye has been irritated since the day prior. He had been dealing with chronic right ear pain over the last 1 month or so. He also admits to a headache in the posterior auricular region recently. Denies any recent viral illnesses. Upon arrival to the ED systolic blood pressure 190D. Stroke physician was contacted and impression was thought to be right-sided Bell's palsy although with patient significant stroke risk factors recommended MRI brain to confirm that there was no stroke. Patient was admitted for further work-up. He continues to be in the ED due to bed situation. He underwent MRI brain this morning which did not reveal any acute abnormalities. Systolic blood pressure continues to be elevated 160-170s. He did receive a dose of Decadron and acyclovir while he was in the ED.   He continues to have mild right facial weakness in addition to decreased right eyebrow raising, decreased right eye closure, decreased right cheek puffing. He does not have any other significant focal deficits.     Past Medical History:     Past Medical History:   Diagnosis Date    Arthritis     GENERALIZED ALL OVER BODY    Diabetes mellitus ([address] 2006    NIDDM ON RX    Difficult intravenous access     ED (erectile dysfunction)     Gout 2009    ON RX    Hyperlipidemia 2006    ON RX    Hypertension 2006    ON RX    Kidney stone 02/2019    LEFT KIDNEY    Kidney stone 10/2019    RIGHT    Prolonged emergence from general anesthesia         Past Surgical History:     Past Surgical History:   Procedure Laterality Date    CARPAL TUNNEL RELEASE Bilateral     LEFT 1996, RIGHT 1997    CYSTO/URETERO/PYELOSCOPY, CALCULUS TX Left 2/7/2019    HOLMIUM - CYSTOSCOPY, URETEROSCOPY, LITHOTRIPSY, STENT PLACEMENT performed by Suzy Hill MD at [address], [place] [address] Right 11/19/2019    HOLMIUM-CYSTO, URETEROSCOPY, LITHOTRIPSY, STENT PLACEMENT performed by Suzy Hill MD at [address], [place] [address] Right 11/27/2019    HOLMIUM- CYSTOSCOPY, URETEROSCOPY, LITHOTRIPSY, STENT EXCHANGE RIGHT URETER performed by Suzy Hill MD at [address] [place] Left 1/20/2019    CYSTOSCOPY URETERAL STENT INSERTION performed by Suzy Hill MD at [address] 02/12/2019    stent removal    CYSTOSCOPY Right 11/27/2019    HOLMIUM- CYSTOSCOPY, URETEROSCOPY, LITHOTRIPSY, STENT EXCHANGE RIGHT URETER    CYSTOSCOPY INSERTION / REMOVAL STENT / STONE Left 2/12/2019    CYSTOSCOPY, STENT REMOVAL performed by Suzy Hill MD at [address]  11/19/2019    hollium laser    KNEE ARTHROSCOPY Right 1983    X 2    KNEE SURGERY Right 1983     X 2    LIPOMA RESECTION Right     X 2 AXILLA    LITHOTRIPSY Left 02/07/2019    HOLMIUM - CYSTOSCOPY, URETEROSCOPY, LITHOTRIPSY, STENT PLACEMENT     LITHOTRIPSY Right 11/19/2019   [address] URETER STENT PLACEMENT Right 11/19/2019        Medications Prior to Admission:     Prior to Admission medications    Medication Sig Start Date End Date Taking? Authorizing Provider   pioglitazone (ACTOS) 45 MG tablet Take 45 mg by mouth daily    Yes Historical Provider, MD   albuterol sulfate HFA (PROAIR HFA) 108 (90 Base) MCG/ACT inhaler Inhale 2 puffs into the lungs every 4 hours as needed for Wheezing 12/21/19  Yes Christian Duque APRN - CNP   metFORMIN (GLUCOPHAGE) 1000 MG tablet Take 1,000 mg by mouth 2 times daily (with meals)   Yes Historical Provider, MD   allopurinol (ZYLOPRIM) 100 MG tablet Take 100 mg by mouth daily   Yes Historical Provider, MD   Icosapent Ethyl (VASCEPA) 1 g CAPS capsule Take 2 capsules by mouth 2 times daily   Yes Historical Provider, MD   glipiZIDE (GLUCOTROL XL) 10 MG extended release tablet Take 10 mg by mouth 2 times daily   Yes Historical Provider, MD   simvastatin (ZOCOR) 20 MG tablet Take 20 mg by mouth nightly. Yes Historical Provider, MD   Semaglutide (OZEMPIC, 1 MG/DOSE, SC) Inject 1 mg into the skin once a week TAKES ON SUNDAYS    Historical Provider, MD   potassium citrate (UROCIT-K) 10 MEQ (1080 MG) extended release tablet Take 1 tablet by mouth 3 times daily (with meals)  Patient taking differently: Take 10 mEq by mouth 3 times daily (with meals) 03/12/2020 NOT TAKING 11/19/19   Dianna Solis MD   sildenafil (VIAGRA) 50 MG tablet Take 50 mg by mouth as needed for Erectile Dysfunction    Historical Provider, MD   carvedilol (COREG) 6.25 MG tablet Take 6.25 mg by mouth daily     Historical Provider, MD   aspirin 81 MG tablet Take 81 mg by mouth daily STOPPED TAKING 02/09/2020. Historical Provider, MD        Allergies:     Penicillins and Percocet [oxycodone-acetaminophen]    Social History:     Tobacco:    reports that he has never smoked. He has never used smokeless tobacco.  Alcohol:      reports previous alcohol use.   Drug Use:  reports no history of drug use.    Family History:     Family History   Problem Relation Age of Onset    Cancer Mother         LUNG    High Blood Pressure Father     Other Father         CIRRHOSIS    Alcohol Abuse Father     Asthma Brother        Review of Systems:       Constitutional Negative for fever and chills   HEENT Negative for ear discharge, positive for ear pain, negative for nosebleed. Negative for photophobia, positive for headache. Musculoskeletal Negative for joint pain, negative for myalgia   Respiratory Negative for cough, dyspnea. Negative for hemoptysis and sputum. Cardiovascular Negative for palpitations, chest pain. Negative for orthopnea, claudication. Gastrointestinal Negative for nausea, vomiting. Negative for abdominal pain, diarrhea, blood in stool   Genitourinary  Negative for dysuria, hematuria. Negative for suprapubic pain. Negative for bladder incontinence. Skin Negative for rash or itching   Hematology Negative for ecchymosis, anemia   Psychiatric Negative for anxiety, depression.  Negative for suicidal ideation, hallucinations         Physical Exam:   BP (!) 174/100   Pulse 110   Temp 98.5 °F (36.9 °C) (Oral)   Resp 19   Ht 5' 10\" (1.778 m)   Wt 285 lb (129.3 kg)   SpO2 96%   BMI 40.89 kg/m²   Temp (24hrs), Av.5 °F (36.9 °C), Min:98.5 °F (36.9 °C), Max:98.5 °F (36.9 °C)        General examination:      General Appearance:  alert, well appearing, and in no acute distress  HEENT: Normocephalic, atraumatic, moist mucus membranes  Neck: supple, no carotid bruits, (-) nuchal rigidity  Lungs:  Respirations unlabored, chest wall no deformity, BS normal  Cardiovascular: normal rate, regular rhythm  Abdomen: Obese, soft, nontender, nondistended, normal bowel sounds  Skin: No gross lesions, rashes, bruising or bleeding on exposed skin area  Extremities:  peripheral pulses palpable, no cyanosis, clubbing or edema  Psych: normal affect      Neurological examination:    Mental status   Alert and oriented x 3; following all commands; speech is fluent, no dysarthria, aphasia. Cranial nerves   II - visual fields intact to confrontation; pupils reactive  III, IV, VI - extraocular muscles intact; no BETSY; no nystagmus; no ptosis   V - normal facial sensation                                                               VII -right facial weakness, decreased right eye closure, decreased right eyebrow raise                                                           VIII - intact hearing                                                                             IX, X - symmetrical palate elevation                                               XI - symmetrical shoulder shrug                                                       XII - midline tongue without atrophy or fasciculation     Motor function  Strength: 5/5 RUE, 5/5 RLE, 5/5 LUE, 5/5  LLE  Normal bulk and tone. No tremors                      Sensory function Intact to touch, pin, vibration, proprioception throughout     Cerebellar Intact finger-nose-finger testing. Intact heel-shin testing. No dysdiadochokinesia present. Reflex function 2/4 symmetric throughout . Downgoing plantar response bilaterally. (-)Su's sign bilaterally    Gait                  Normal station and gait             Diagnostics:      Laboratory Testing:  CBC:   Recent Labs     12/31/20 2230 01/01/21  0539   WBC 8.8 9.8   HGB 14.4 13.8    195     BMP:    Recent Labs     12/31/20 2230 01/01/21  0539    137   K 4.0 4.7    104   CO2 28 24   BUN 22* 22*   CREATININE 1.40* 1.40*   GLUCOSE 171* 316*         Lab Results   Component Value Date    CHOL 123 07/15/2013    LDLCHOLESTEROL 57 07/15/2013    HDL 18 (L) 07/15/2013    TRIG 238 (H) 07/15/2013    ALT 14 01/19/2019    AST 10 01/19/2019    TSH 1.45 06/16/2015    INR 1.1 12/31/2020    LABA1C 9.1 (H) 01/21/2019    LABMICR 5 07/15/2013       Imaging/Diagnostics:      CT head: No acute process.     MRI brain: No

## 2021-01-01 NOTE — VIRTUAL HEALTH
Consults  Patient Location:  [facility] Stroke and Vascular Neurology Consult for  [facility] Stroke Alert through [address] @ 23:29  12/31/2020 11:50 PM  Pt Name: Kevin Wilkes  MRN: [id number]  [place]: 1970  Date of evaluation: 12/31/2020  Primary Care Physician: Joyce Durant MD  Reason for Evaluation: Stroke evaluation with Phone Consult, Discussion and Review of imaging    Kevin Wilkes is a 53 y.o. male with medical history of diabetes, hyperlipidemia and hypertension who presents with right facial weakness which began this afternoon. The patient was in his normal state of health until 1200 today when he developed right facial weakness. Patient also noted recent ear discomfort and fullness. In the ED, the patient had a head CT performed which showed no acute processes. Stroke alert called. Allergies  is allergic to penicillins and percocet [oxycodone-acetaminophen]. Medications  Prior to Admission medications    Medication Sig Start Date End Date Taking? Authorizing Provider   pioglitazone (ACTOS) 45 MG tablet Take 45 mg by mouth daily    Yes Historical Provider, MD   albuterol sulfate HFA (PROAIR HFA) 108 (90 Base) MCG/ACT inhaler Inhale 2 puffs into the lungs every 4 hours as needed for Wheezing 12/21/19  Yes RIKKI Issa CNP   metFORMIN (GLUCOPHAGE) 1000 MG tablet Take 1,000 mg by mouth 2 times daily (with meals)   Yes Historical Provider, MD   allopurinol (ZYLOPRIM) 100 MG tablet Take 100 mg by mouth daily   Yes Historical Provider, MD   Icosapent Ethyl (VASCEPA) 1 g CAPS capsule Take 2 capsules by mouth 2 times daily   Yes Historical Provider, MD   glipiZIDE (GLUCOTROL XL) 10 MG extended release tablet Take 10 mg by mouth 2 times daily   Yes Historical Provider, MD   simvastatin (ZOCOR) 20 MG tablet Take 20 mg by mouth nightly.    Yes Historical Provider, MD   Semaglutide (OZEMPIC, 1 MG/DOSE, SC) Inject 1 mg into the skin once a week TAKES ON SUNDAYS    Historical Provider, MD   potassium citrate (UROCIT-K) 10 MEQ (1080 MG) extended release tablet Take 1 tablet by mouth 3 times daily (with meals)  Patient taking differently: Take 10 mEq by mouth 3 times daily (with meals) 03/12/2020 NOT TAKING 11/19/19   Ann Gonzales MD   sildenafil (VIAGRA) 50 MG tablet Take 50 mg by mouth as needed for Erectile Dysfunction    Historical Provider, MD   carvedilol (COREG) 6.25 MG tablet Take 6.25 mg by mouth daily     Historical Provider, MD   aspirin 81 MG tablet Take 81 mg by mouth daily STOPPED TAKING 02/09/2020. Historical Provider, MD    Scheduled Meds:   acyclovir  500 mg Intravenous Once     Continuous Infusions:  PRN Meds:.  Past Medical History   has a past medical history of Arthritis, Diabetes mellitus ([facility] [address] Difficult intravenous access, ED (erectile dysfunction), Gout, Hyperlipidemia, Hypertension, Kidney stone, Kidney stone, and Prolonged emergence from general anesthesia.   Social History  Social History     Socioeconomic History    Marital status:      Spouse name: Not on file    Number of children: Not on file    Years of education: Not on file    Highest education level: Not on file   Occupational History    Not on file   Social Needs    Financial resource strain: Not on file    Food insecurity     Worry: Not on file     Inability: Not on file    Transportation needs     Medical: Not on file     Non-medical: Not on file   Tobacco Use    Smoking status: Never Smoker    Smokeless tobacco: Never Used   Substance and Sexual Activity    Alcohol use: Not Currently    Drug use: No    Sexual activity: Not on file   Lifestyle    Physical activity     Days per week: Not on file     Minutes per session: Not on file    Stress: Not on file   Relationships    Social connections     Talks on phone: Not on file     Gets together: Not on file     Attends [employer] service: Not on file Active member of club or organization: Not on file     Attends meetings of clubs or organizations: Not on file     Relationship status: Not on file    Intimate partner violence     Fear of current or ex partner: Not on file     Emotionally abused: Not on file     Physically abused: Not on file     Forced sexual activity: Not on file   Other Topics Concern    Not on file   Social History Narrative    Not on file     Family History      Problem Relation Age of Onset    Cancer Mother         LUNG    High Blood Pressure Father     Other Father         CIRRHOSIS    Alcohol Abuse Father     Asthma Brother        OBJECTIVE  BP (!) 166/90   Pulse 87   Temp 98.5 °F (36.9 °C) (Oral)   Resp 14   Ht 5' 10\" (1.778 m)   Wt 285 lb (129.3 kg)   SpO2 96%   BMI 40.89 kg/m²     NIH Stroke Scale  NIH Stroke Scale Assessed: Yes  Interval: Baseline  Level of Consciousness (1a. ): Alert  LOC Questions (1b. ): Answers both correctly  LOC Commands (1c. ): Performs both tasks correctly  Best Gaze (2. ): Normal  Visual (3. ): No visual loss  Facial Palsy (4. ): (!) Partial paralysis  Motor Arm, Left (5a. ): No drift  Motor Arm, Right (5b. ): No drift  Motor Leg, Left (6a. ): No drift  Motor Leg, Right (6b. ): No drift  Limb Ataxia (7. ): Absent  Sensory (8. ): Normal  Best Language (9. ): No aphasia  Dysarthria (10. ): Normal  Extinction and Inattention (11): No abnormality  Total: 2  Pre-Morbid mRS: 0    Imaging:  Images were personally reviewed with PACS used to review images including:  CT brain without contrast: No acute processes  CTA imaging:  Deferred due to CKD    Assessment      Differential DDx:  1. Bell's palsy  2. TIA  3. Left subcortical lacunar infarct      Recommendations:  1. NIH 2  2. Recommend Inpatient/Observational Neurology Consult for further assessment and evaluation   3. Patient is not a tpa candidate due to low NIHSS.     4. Patient's symptoms and physical findings most consistent with Bell's palsy or

## 2021-01-01 NOTE — ED NOTES
22:26 Stroke Alert text sent  Electronically signed by Magen Parmar on 1/1/2021 at 12:49 AM       Magen Parmar  01/01/21 0049

## 2021-01-01 NOTE — PROGRESS NOTES
Admitted from ER to room 1002-2. Pt alert and oriented. Pt oriented to call light system and agreeable to call for assistance.  Admission documentation completed

## 2021-01-01 NOTE — H&P
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 *        Assesment:     · Strokelike symptoms  · Bell's palsy  · Acute kidney injury  · Diabetes mellitus type 2  · Essential hypertension  · Hyperlipidemia      Plan:     · Admit patient to intermediate floor  · Oxygen keep aspirin more than 90%  · Telemetry  · Check vital signs closely  · CBC BMP daily  · Carotid Doppler  · MRI brain  · Continue aspirin  · Atorvastatin  · Continue carvedilol  · Continue Actos, insulin sliding scale and close monitoring of blood sugar  · Neurology consult  · Continue to monitor  · Further recommendation to follow  · DVT and GI prophylaxis. Chief Complaint:     Chief Complaint   Patient presents with    Headache     headache that began yesterday and ended this morning.  Facial Droop     RIGHT sided facial droop that began at noon today. pt thought it was stress related and thatis why he didn't come in. Pt has no hx of stroke         History of Present Illness:      Cain Estrada is a 53 y.o.  male who presents with Headache (headache that began yesterday and ended this morning. ) and Facial Droop (RIGHT sided facial droop that began at noon today. pt thought it was stress related and thatis why he didn't come in.  Pt has no hx of stroke)  This is a 76-year-old gentleman who is been admitted by emergency room, patient came to the ER with a complaint of having strokelike symptoms, patient is noticed to have right-sided facial droop that started around noon, further patient denies any history of stroke, patient denies any change in his vision, or any difficulty speaking or swallowing or understanding, no numbness or tenderness, patient has some eye discomfort and eye dryness, and recent discomfort in his ear, further patient should testing in the emergency room included a CT brain which is unremarkable, concern for Bell's palsy and strokelike symptoms, admitted for further management    I have personally reviewed the past medical history, past surgical history, medications, social history, and family history, and summarized in the note. Review of Systems:     All 10 point system is reviewed and negative otherwise mentioned in HPI.       Past Medical History:     Past Medical History:   Diagnosis Date    Arthritis     GENERALIZED ALL OVER BODY    Diabetes mellitus ([address] 2006    NIDDM ON RX    Difficult intravenous access     ED (erectile dysfunction)     Gout 2009    ON RX    Hyperlipidemia 2006    ON RX    Hypertension 2006    ON RX    Kidney stone 02/2019    LEFT KIDNEY    Kidney stone 10/2019    RIGHT    Prolonged emergence from general anesthesia         Past Surgical History:     Past Surgical History:   Procedure Laterality Date    CARPAL TUNNEL RELEASE Bilateral     LEFT 1996, RIGHT 1997    CYSTO/URETERO/PYELOSCOPY, CALCULUS TX Left 2/7/2019    HOLMIUM - CYSTOSCOPY, URETEROSCOPY, LITHOTRIPSY, STENT PLACEMENT performed by Osiel Antonio MD at [address] [facility] [address] , [facility] [address] Right 11/19/2019    HOLMIUM-CYSTO, URETEROSCOPY, LITHOTRIPSY, STENT PLACEMENT performed by Osiel Antonio MD at [address] [facility] Right 11/27/2019    HOLMIUM- CYSTOSCOPY, URETEROSCOPY, LITHOTRIPSY, STENT EXCHANGE RIGHT URETER performed by Osiel Antonio MD at [address] [facility] Left 1/20/2019    CYSTOSCOPY URETERAL STENT INSERTION performed by Osiel Antonio MD at [address] [place] [address] 02/12/2019    stent removal    CYSTOSCOPY Right 11/27/2019    HOLMIUM- CYSTOSCOPY, URETEROSCOPY, LITHOTRIPSY, STENT EXCHANGE RIGHT URETER    CYSTOSCOPY INSERTION / REMOVAL STENT / STONE Left 2/12/2019    CYSTOSCOPY, STENT REMOVAL performed by Laura Thomas MD at [address]  11/19/2019    hollium laser    KNEE ARTHROSCOPY Right 1983    X 2    KNEE SURGERY Right 1983     X 2    LIPOMA RESECTION Right     X 2 AXILLA    LITHOTRIPSY Left 02/07/2019    HOLMIUM - CYSTOSCOPY, URETEROSCOPY, LITHOTRIPSY, STENT PLACEMENT     LITHOTRIPSY Right 11/19/2019    TONSILLECTOMY  1976    URETER STENT PLACEMENT Right 11/19/2019        Medications Prior to Admission:       Prior to Admission medications    Medication Sig Start Date End Date Taking? Authorizing Provider   pioglitazone (ACTOS) 45 MG tablet Take 45 mg by mouth daily    Yes Historical Provider, MD   albuterol sulfate HFA (PROAIR HFA) 108 (90 Base) MCG/ACT inhaler Inhale 2 puffs into the lungs every 4 hours as needed for Wheezing 12/21/19  Yes Mary Mohr APRN - CNP   metFORMIN (GLUCOPHAGE) 1000 MG tablet Take 1,000 mg by mouth 2 times daily (with meals)   Yes Historical Provider, MD   allopurinol (ZYLOPRIM) 100 MG tablet Take 100 mg by mouth daily   Yes Historical Provider, MD   Icosapent Ethyl (VASCEPA) 1 g CAPS capsule Take 2 capsules by mouth 2 times daily   Yes Historical Provider, MD   glipiZIDE (GLUCOTROL XL) 10 MG extended release tablet Take 10 mg by mouth 2 times daily   Yes Historical Provider, MD   simvastatin (ZOCOR) 20 MG tablet Take 20 mg by mouth nightly.    Yes Historical Provider, MD   Semaglutide (OZEMPIC, 1 MG/DOSE, SC) Inject 1 mg into the skin once a week TAKES ON SUNDAYS    Historical Provider, MD   potassium citrate (UROCIT-K) 10 MEQ (1080 MG) extended release tablet Take 1 tablet by mouth 3 times daily (with meals)  Patient taking differently: Take 10 mEq by mouth 3 times daily (with meals) 03/12/2020 NOT TAKING 11/19/19   Lit Humphrey MD   sildenafil (VIAGRA) 50 MG tablet Take 50 mg by mouth as needed for Erectile Dysfunction    Historical Provider, MD   carvedilol (COREG) 6.25 MG tablet Take 6.25 mg by mouth daily     Historical Provider, MD   aspirin 81 MG tablet Take 81 mg by mouth daily STOPPED TAKING 2020. Historical Provider, MD        Allergies:       Penicillins and Percocet [oxycodone-acetaminophen]    Social History:     Tobacco:    reports that he has never smoked. He has never used smokeless tobacco.  Alcohol:      reports previous alcohol use. Drug Use:  reports no history of drug use.     Family History:     Family History   Problem Relation Age of Onset    Cancer Mother         LUNG    High Blood Pressure Father     Other Father         CIRRHOSIS    Alcohol Abuse Father     Asthma Brother          Physical Exam:     Vitals:  BP (!) 174/100   Pulse 110   Temp 98.5 °F (36.9 °C) (Oral)   Resp 19   Ht 5' 10\" (1.778 m)   Wt 285 lb (129.3 kg)   SpO2 96%   BMI 40.89 kg/m²   Temp (24hrs), Av.5 °F (36.9 °C), Min:98.5 °F (36.9 °C), Max:98.5 °F (36.9 °C)          General appearance - alert, well appearing, and in no acute distress  Mental status - oriented to person, place, and time with normal affect  Head - normocephalic and atraumatic  Eyes - pupils equal and reactive, extraocular eye movements intact, conjunctiva clear  Ears - hearing appears to be intact  Nose - no drainage noted  Mouth - mucous membranes moist  Neck - supple, no carotid bruits, thyroid not palpable  Chest - clear to auscultation, normal effort  Heart - normal rate, regular rhythm, no murmur  Abdomen - soft, nontender, nondistended, bowel sounds present all four quadrants, no masses, hepatomegaly or splenomegaly  Neurological - normal speech, no focal findings or movement disorder noted, cranial nerves II through XII grossly intact, right-sided facial droop  Extremities - peripheral pulses palpable, no pedal edema or calf pain with palpation  Skin - no gross lesions, rashes, or induration noted        Data:     Labs:    Hematology:  Recent Labs     20  2230 21  0539   WBC 8.8 9.8   RBC content of the visit, no guarantees can be provided that every mistake has been identified and corrected by editing. Note was updated later by me after  physical examination and  completion of the assessment.

## 2021-01-01 NOTE — ED PROVIDER NOTES
[address] [place] [address] ED  eMERGENCY dEPARTMENT eNCOUnter      Pt Name: Andreia Hays  MRN: [id number]  [place] 1970  Date of evaluation: 12/31/2020  Provider: Davis Angelucci, MD    CHIEF COMPLAINT       Chief Complaint   Patient presents with    Headache     headache that began yesterday and ended this morning.  Facial Droop     RIGHT sided facial droop that began at noon today. pt thought it was stress related and thatis why he didn't come in. Pt has no hx of stroke         HISTORY OF PRESENT ILLNESS  (Location/Symptom, Timing/Onset, Context/Setting, Quality, Duration, Modifying Factors, Severity.)   Andreia Hays is a 53 y.o. male who presents to the emergency department for evaluation of strokelike symptoms. Patient had onset of right-sided facial droop about noon. He presents to the ER at 10 PM.  He has had recent right ear discomfort. No fevers chills or neck pain. He denies any changes in vision. No difficulty breathing speaking or swallowing. No numbness or tingling. He states he has had some eye discomfort and eye dryness noted with regard to the right as well      Nursing Notes were reviewed. ALLERGIES     Penicillins and Percocet [oxycodone-acetaminophen]    CURRENT MEDICATIONS       Previous Medications    ALBUTEROL SULFATE HFA (PROAIR HFA) 108 (90 BASE) MCG/ACT INHALER    Inhale 2 puffs into the lungs every 4 hours as needed for Wheezing    ALLOPURINOL (ZYLOPRIM) 100 MG TABLET    Take 100 mg by mouth daily    ASPIRIN 81 MG TABLET    Take 81 mg by mouth daily STOPPED TAKING 02/09/2020.     CARVEDILOL (COREG) 6.25 MG TABLET    Take 6.25 mg by mouth daily     GLIPIZIDE (GLUCOTROL XL) 10 MG EXTENDED RELEASE TABLET    Take 10 mg by mouth 2 times daily    ICOSAPENT ETHYL (VASCEPA) 1 G CAPS CAPSULE    Take 2 capsules by mouth 2 times daily    METFORMIN (GLUCOPHAGE) 1000 MG TABLET    Take 1,000 mg by mouth 2 times daily (with meals)    PIOGLITAZONE (ACTOS) 45 MG TABLET    Take 45 mg by mouth daily     POTASSIUM CITRATE (UROCIT-K) 10 MEQ (1080 MG) EXTENDED RELEASE TABLET    Take 1 tablet by mouth 3 times daily (with meals)    SEMAGLUTIDE (OZEMPIC, 1 MG/DOSE, SC)    Inject 1 mg into the skin once a week TAKES ON SUNDAYS    SILDENAFIL (VIAGRA) 50 MG TABLET    Take 50 mg by mouth as needed for Erectile Dysfunction    SIMVASTATIN (ZOCOR) 20 MG TABLET    Take 20 mg by mouth nightly.        PAST MEDICAL HISTORY         Diagnosis Date    Arthritis     GENERALIZED ALL OVER BODY    Diabetes mellitus ([address] 2006    NIDDM ON RX    Difficult intravenous access     ED (erectile dysfunction)     Gout 2009    ON RX    Hyperlipidemia 2006    ON RX    Hypertension 2006    ON RX    Kidney stone 02/2019    LEFT KIDNEY    Kidney stone 10/2019    RIGHT    Prolonged emergence from general anesthesia        SURGICAL HISTORY           Procedure Laterality Date    CARPAL TUNNEL RELEASE Bilateral     LEFT 1996, RIGHT 1997    CYSTO/URETERO/PYELOSCOPY, CALCULUS [address] Left 2/7/2019    HOLMIUM - CYSTOSCOPY, URETEROSCOPY, LITHOTRIPSY, STENT PLACEMENT performed by Curtis Mathias MD at [address] [place] [address] Right 11/19/2019    HOLMIUM-CYSTO, URETEROSCOPY, LITHOTRIPSY, STENT PLACEMENT performed by Curtis Mathias MD at [address] [place] [address] Right 11/27/2019    HOLMIUM- CYSTOSCOPY, URETEROSCOPY, LITHOTRIPSY, STENT EXCHANGE RIGHT URETER performed by Curtis Mathias MD at [address] [facility] Left 1/20/2019    CYSTOSCOPY URETERAL STENT INSERTION performed [facility] [address] [facility] 02/12[facility]    stent removal    CYSTOSCOPY Right 11/27/2019    HOLMIUM- CYSTOSCOPY, URETEROSCOPY, LITHOTRIPSY, STENT EXCHANGE RIGHT URETER    CYSTOSCOPY INSERTION / REMOVAL STENT / STONE Left 2/12/2019    CYSTOSCOPY, STENT REMOVAL performed by Curtis Mathias MD at [place] [address]  11/19/2019    hollium laser    KNEE ARTHROSCOPY Right 1983    X 2    swallowing. Heart regular rate and rhythm normal S1-S2 no murmurs rubs gallops. Lungs are clear to auscultation without wheezes rales or rhonchi. Abdomen is moderately obese soft and nontender. Extremities show no cyanosis clubbing or edema. Major muscle group strength present 5 out of 5 in all 4 extremities. He is ambulatory without assist without deficit. Deep tendon reflexes present 2+ in all 4 extremities. Other than his right-sided facial droop he has no significant deficits    DIAGNOSTIC RESULTS     EKG: All EKG's are interpreted by the Emergency Department Physician who either signs or Co-signs this chart in the absence of a cardiologist.    EKG demonstrates a sinus rhythm in the 90s. Nonspecific ST changes noted. No ectopy or ischemic change. RADIOLOGY:   Non-plain film images such as CT, Ultrasound and MRI are read by the radiologist. Plain radiographic images are visualized and preliminarily interpreted by the emergency physician with the below findings:        Interpretation per the Radiologist below, if available at the time of this note:    CT HEAD WO CONTRAST   Final Result   No acute intracranial abnormality. Findings were discussed with Vicky BOLDEN at 10:59 pm on 12/31/2020.       RECOMMENDATIONS:   For clinical suspicion of acute ischemia, recommend MRI brain with   diffusion-weighted imaging for further evaluation                 LABS:  Labs Reviewed   BASIC METABOLIC PANEL - Abnormal; Notable for the following components:       Result Value    Glucose 171 (*)     BUN 22 (*)     CREATININE 1.40 (*)     GFR Non- 54 (*)     All other components within normal limits   CBC WITH AUTO DIFFERENTIAL - Abnormal; Notable for the following components:    Seg Neutrophils 67 (*)     Lymphocytes 19 (*)     Immature Granulocytes 1 (*)     All other components within normal limits   APTT - Abnormal; Notable for the following components:    PTT 34.4 (*)     All other components within normal limits   POC GLUCOSE FINGERSTICK - Abnormal; Notable for the following components:    POC Glucose 138 (*)     All other components within normal limits   PROTIME-INR   CK ISOENZYMES   TROP/MYOGLOBIN   MAGNESIUM       All other labs were within normal range or not returned as of this dictation. EMERGENCY DEPARTMENT COURSE and DIFFERENTIAL DIAGNOSIS/MDM:   Vitals:    Vitals:    12/31/20 2220 12/31/20 2221 12/31/20 2256 12/31/20 2311   BP:  (!) 194/102 (!) 156/79 (!) 166/90   Pulse:  102 92 87   Resp:  18 16 14   Temp: 98.5 °F (36.9 °C)      TempSrc: Oral Oral     SpO2:  99% 97% 96%   Weight: 285 lb (129.3 kg)      Height: 5' 10\" (1.778 m)        Patient presents with strokelike symptoms. Symptoms are suggestive of Bell's palsy. This is discussed with patient. He does however have multiple risk factors. Initial head CT is negative. He does have mild renal insufficiency and CTA cannot be obtained. Care is discussed with neurology as well as radiology. Recommendation for observation admission and completion of TIA/CVA evaluation to include MRI without contrast in the morning    CONSULTS:  Armand Menon TO NEUROLOGY    PROCEDURES:  None    FINAL IMPRESSION      1. Stroke-like symptoms    2. Bell's paralysis          DISPOSITION/PLAN   DISPOSITION Decision To Admit 12/31/2020 11:49:31 PM      PATIENT REFERRED TO:   No follow-up provider specified.     DISCHARGE MEDICATIONS:     New Prescriptions    No medications on file           (Please note that portions of this note were completed with a voice recognition program.  Efforts were made to edit the dictations but occasionally words are mis-transcribed.)    Ozzie Osorio MD  Attending Emergency Physician          Ozzie Osorio MD  12/31/20 [id number]

## 2021-01-01 NOTE — CODE DOCUMENTATION
Pt presents with c/o right sided facial droop and headache. States his symptoms started at 1200 today, but he thought it was stress related. A+Ox4. Right sided facial droop noted. Dr. Shirin Romo at bedside.

## 2021-01-02 VITALS
RESPIRATION RATE: 16 BRPM | HEART RATE: 87 BPM | BODY MASS INDEX: 40.8 KG/M2 | WEIGHT: 285 LBS | OXYGEN SATURATION: 97 % | SYSTOLIC BLOOD PRESSURE: 156 MMHG | TEMPERATURE: 97.5 F | DIASTOLIC BLOOD PRESSURE: 79 MMHG | HEIGHT: 70 IN

## 2021-01-02 LAB
ABSOLUTE EOS #: <0.03 K/UL (ref 0–0.44)
ABSOLUTE IMMATURE GRANULOCYTE: 0.19 K/UL (ref 0–0.3)
ABSOLUTE LYMPH #: 1.17 K/UL (ref 1.1–3.7)
ABSOLUTE MONO #: 0.96 K/UL (ref 0.1–1.2)
ANION GAP SERPL CALCULATED.3IONS-SCNC: 10 MMOL/L (ref 9–17)
BASOPHILS # BLD: 0 % (ref 0–2)
BASOPHILS ABSOLUTE: 0.03 K/UL (ref 0–0.2)
BUN BLDV-MCNC: 26 MG/DL (ref 6–20)
BUN/CREAT BLD: 19 (ref 9–20)
CALCIUM SERPL-MCNC: 9.3 MG/DL (ref 8.6–10.4)
CHLORIDE BLD-SCNC: 104 MMOL/L (ref 98–107)
CHOLESTEROL/HDL RATIO: 6.4
CHOLESTEROL: 108 MG/DL
CO2: 24 MMOL/L (ref 20–31)
CREAT SERPL-MCNC: 1.37 MG/DL (ref 0.7–1.2)
DIFFERENTIAL TYPE: ABNORMAL
EOSINOPHILS RELATIVE PERCENT: 0 % (ref 1–4)
GFR AFRICAN AMERICAN: >60 ML/MIN
GFR NON-AFRICAN AMERICAN: 55 ML/MIN
GFR SERPL CREATININE-BSD FRML MDRD: ABNORMAL ML/MIN/{1.73_M2}
GFR SERPL CREATININE-BSD FRML MDRD: ABNORMAL ML/MIN/{1.73_M2}
GLUCOSE BLD-MCNC: 198 MG/DL (ref 75–110)
GLUCOSE BLD-MCNC: 234 MG/DL (ref 70–99)
GLUCOSE BLD-MCNC: 248 MG/DL (ref 75–110)
HCT VFR BLD CALC: 43.2 % (ref 40.7–50.3)
HDLC SERPL-MCNC: 17 MG/DL
HEMOGLOBIN: 14 G/DL (ref 13–17)
IMMATURE GRANULOCYTES: 1 %
LDL CHOLESTEROL: 50 MG/DL (ref 0–130)
LYMPHOCYTES # BLD: 8 % (ref 24–43)
MCH RBC QN AUTO: 27.5 PG (ref 25.2–33.5)
MCHC RBC AUTO-ENTMCNC: 32.4 G/DL (ref 28.4–34.8)
MCV RBC AUTO: 84.9 FL (ref 82.6–102.9)
MONOCYTES # BLD: 6 % (ref 3–12)
NRBC AUTOMATED: 0 PER 100 WBC
PDW BLD-RTO: 13.4 % (ref 11.8–14.4)
PLATELET # BLD: 221 K/UL (ref 138–453)
PLATELET ESTIMATE: ABNORMAL
PMV BLD AUTO: 9.4 FL (ref 8.1–13.5)
POTASSIUM SERPL-SCNC: 4.5 MMOL/L (ref 3.7–5.3)
RBC # BLD: 5.09 M/UL (ref 4.21–5.77)
RBC # BLD: ABNORMAL 10*6/UL
SEG NEUTROPHILS: 85 % (ref 36–65)
SEGMENTED NEUTROPHILS ABSOLUTE COUNT: 13.35 K/UL (ref 1.5–8.1)
SODIUM BLD-SCNC: 138 MMOL/L (ref 135–144)
TRIGL SERPL-MCNC: 207 MG/DL
VLDLC SERPL CALC-MCNC: ABNORMAL MG/DL (ref 1–30)
WBC # BLD: 15.7 K/UL (ref 3.5–11.3)
WBC # BLD: ABNORMAL 10*3/UL

## 2021-01-02 PROCEDURE — 6360000002 HC RX W HCPCS: Performed by: HOSPITALIST

## 2021-01-02 PROCEDURE — 93880 EXTRACRANIAL BILAT STUDY: CPT

## 2021-01-02 PROCEDURE — 85025 COMPLETE CBC W/AUTO DIFF WBC: CPT

## 2021-01-02 PROCEDURE — 36415 COLL VENOUS BLD VENIPUNCTURE: CPT

## 2021-01-02 PROCEDURE — 80048 BASIC METABOLIC PNL TOTAL CA: CPT

## 2021-01-02 PROCEDURE — 6370000000 HC RX 637 (ALT 250 FOR IP): Performed by: PSYCHIATRY & NEUROLOGY

## 2021-01-02 PROCEDURE — 6370000000 HC RX 637 (ALT 250 FOR IP): Performed by: HOSPITALIST

## 2021-01-02 PROCEDURE — G0378 HOSPITAL OBSERVATION PER HR: HCPCS

## 2021-01-02 PROCEDURE — 96372 THER/PROPH/DIAG INJ SC/IM: CPT

## 2021-01-02 PROCEDURE — 82947 ASSAY GLUCOSE BLOOD QUANT: CPT

## 2021-01-02 RX ORDER — PREDNISONE 20 MG/1
80 TABLET ORAL DAILY
Qty: 40 TABLET | Refills: 0 | Status: SHIPPED | OUTPATIENT
Start: 2021-01-03 | End: 2021-01-13

## 2021-01-02 RX ADMIN — PIOGLITAZONE 45 MG: 15 TABLET ORAL at 12:58

## 2021-01-02 RX ADMIN — ASPIRIN 81 MG: 81 TABLET, CHEWABLE ORAL at 09:16

## 2021-01-02 RX ADMIN — FAMOTIDINE 20 MG: 20 TABLET, FILM COATED ORAL at 09:16

## 2021-01-02 RX ADMIN — CARVEDILOL 6.25 MG: 6.25 TABLET, FILM COATED ORAL at 09:16

## 2021-01-02 RX ADMIN — HEPARIN SODIUM 5000 UNITS: 5000 INJECTION INTRAVENOUS; SUBCUTANEOUS at 09:16

## 2021-01-02 RX ADMIN — INSULIN LISPRO 2 UNITS: 100 INJECTION, SOLUTION INTRAVENOUS; SUBCUTANEOUS at 09:16

## 2021-01-02 RX ADMIN — PREDNISONE 80 MG: 20 TABLET ORAL at 09:16

## 2021-01-02 RX ADMIN — INSULIN LISPRO 4 UNITS: 100 INJECTION, SOLUTION INTRAVENOUS; SUBCUTANEOUS at 12:58

## 2021-01-02 NOTE — CARE COORDINATION
Case Management Initial Discharge Plan  Nolvia Barba,             Met with:patient to discuss discharge plans. Information verified: address, contacts, phone number, , insurance Yes    Emergency Contact/Next of Uma Blue name & number: Libia/spouse    [phone]    PCP: Ulisses Can MD  Date of last visit: In November    Insurance Provider: Philip    Discharge Planning    Living Arrangements:  Spouse/Significant Other   Support Systems:  Other (Comment)    Home has 1 stories  0 stairs to climb to get into front door, 0stairs to climb to reach second floor  Location of bedroom/bathroom in home main    Patient able to perform ADL's:Independent    Current Services (outpatient & in home) none  DME equipment: cane and CPAP  DME provider: 0    Receiving oral anticoagulation therapy? No    If indicated:   Physician managing anticoagulation treatment:   Where does patient obtain lab work for ATC treatment? Potential Assistance Needed:  N/A    Patient agreeable to home care: No  [place] of choice provided:  n/a    Prior SNF/Rehab Placement and Facility: n/a  Agreeable to SNF/Rehab: No  [place] of choice provided: n/a     Evaluation: no    Expected Discharge date:  21    Patient expects to be discharged to:  Home  Follow Up Appointment: Best Day/ Time: Monday AM    Transportation provider: has car in ED  Transportation arrangements needed for discharge: No    Readmission Risk              Risk of Unplanned Readmission:        0             Does patient have a readmission risk score greater than 14?: No  If yes, follow-up appointment must be made within 7 days of discharge. Goals of Care:       Discharge Plan: Dg: CVA  Patient lives with spouse in a 1 story home with 0 steps to enter  Has never had HC and uses a cane prn and a CPAP  Negative for CVA.  Has [place] palsy  Consults to PT/OT and neurology  Declines any skilled needs  [facility] is [facility] [place]  Has transportation home  Continue to follow          Electronically signed by Devonte Ricketts RN on 1/2/21 at 10:39 AM EST

## 2021-01-02 NOTE — PROGRESS NOTES
Physical Therapy  DATE: 2021    NAME: Kristian Kauffman  MRN: [id number]   : 1970    Patient not seen this date for Physical Therapy due to:  [] Blood transfusion in progress  [] Cancel by RN  [] Hemodialysis  []  Refusal by Patient   [] Spine Precautions   [] Strict Bedrest  [] Surgery  [] Testing      [] Other        [x] PT being discontinued at this time. Patient independent. No further needs. - Per RN and pt, no skilled PT needed at this time. Will sign off.   [] PT being discontinued at this time as the patient has been transferred to hospice care. No further needs.     Attila Blanco, PT

## 2021-01-02 NOTE — DISCHARGE SUMMARY
his primary care doctor to adjust his antihypertensive medications. Patient verbalized understanding. The plan was discussed in detail with patient who agreed with the plan and verbalized understanding . The patient was seen and examined on day of discharge and this discharge summary is in conjunction with any daily progress note from day of discharge.     Hospital Data:    Labs:    Hematology:  Recent Labs     12/31/20 2230 01/01/21  0539 01/02/21  0610   WBC 8.8 9.8 15.7*   RBC 5.22 5.05 5.09   HGB 14.4 13.8 14.0   HCT 45.4 43.6 43.2   MCV 87.0 86.3 84.9   MCH 27.6 27.3 27.5   MCHC 31.7 31.7 32.4   RDW 13.5 13.3 13.4    195 221   MPV 9.2 9.4 9.4   INR 1.1  --   --      Chemistry:  Recent Labs     12/31/20 2230 01/01/21 0202 01/01/21  0539 01/01/21  0945 01/01/21  1748 01/02/21  0610     --  137  --   --  138   K 4.0  --  4.7  --   --  4.5     --  104  --   --  104   CO2 28  --  24  --   --  24   GLUCOSE 171*  --  316*  --   --  234*   BUN 22*  --  22*  --   --  26*   CREATININE 1.40*  --  1.40*  --   --  1.37*   MG 1.8  --   --   --   --   --    ANIONGAP 10  --  9  --   --  10   LABGLOM 54*  --  54*  --   --  55*   GFRAA >60  --  >60  --   --  >60   CALCIUM 9.3  --  9.1  --   --  9.3   TROPHS 20 17  --  17 18  --    CKTOTAL 143  --   --   --   --   --    CKMB 3.8  --   --   --   --   --    MYOGLOBIN 71  --   --   --   --   --      Recent Labs     12/31/20 2217 01/01/21  0202 01/01/21  1729 01/01/21  1953 01/01/21  2156 01/02/21  0727 01/02/21  1105   LABA1C  --  7.1*  --   --   --   --   --    CHOL  --  108  --   --   --   --   --    HDL  --  17*  --   --   --   --   --    LDLCHOLESTEROL  --  50  --   --   --   --   --    CHOLHDLRATIO  --  6.4*  --   --   --   --   --    TRIG  --  207*  --   --   --   --   --    VLDL  --  NOT REPORTED*  --   --   --   --   --    POCGLU 138*  --  267* 281* 322* 198* 248*     Lab Results   Component Value Date    INR 1.1 12/31/2020    INR 1.1 2019    PROTIME 13.7 2020    PROTIME 11.0 2019     Lab Results   Component Value Date/Time    SPECIAL NOT REPORTED 2019 02:07 PM     Lab Results   Component Value Date/Time    CULTURE NO GROWTH 2019 02:07 PM       No results found for: POCPH, PHART, PH, POCPCO2, GXD2ZMB, PCO2, POCPO2, PO2ART, PO2, POCHCO3, HGV8HQG, HCO3, NBEA, PBEA, BEART, BE, THGBART, THB, USZ1GNN, AUKV8DLN, F9RNTGKJ, O2SAT, FIO2    Radiology:    Ct Head Wo Contrast    Result Date: 2020  No acute intracranial abnormality. Findings were discussed with Franck BOLDEN at 10:59 pm on 2020.  RECOMMENDATIONS: For clinical suspicion of acute ischemia, recommend MRI brain with diffusion-weighted imaging for further evaluation     Mri Brain Wo Contrast    Result Date: 2021  Unremarkable exam         All radiological studies reviewed      Reviews of Symptoms:    A 10 point system is reviewed and  negative except described in hospital course    Physical Exam:    Vitals:  BP (!) 156/79   Pulse 87   Temp 97.5 °F (36.4 °C) (Oral)   Resp 16   Ht 5' 10\" (1.778 m)   Wt 285 lb (129.3 kg)   SpO2 97%   BMI 40.89 kg/m²   Temp (24hrs), Av °F (36.7 °C), Min:97.5 °F (36.4 °C), Max:98.6 °F (37 °C)      General appearance - alert, well appearing, and in no acute distress  Mental status - oriented to person, place, and time with normal affect  Head - normocephalic and atraumatic  Eyes - pupils equal and reactive, extraocular eye movements intact, conjunctiva clear  Ears - hearing appears to be intact  Nose - no drainage noted  Mouth - mucous membranes moist  Neck - supple, no carotid bruits, thyroid not palpable  Chest - clear to auscultation, normal effort  Heart - normal rate, regular rhythm, no murmur  Abdomen - soft, nontender, nondistended, bowel sounds present all four quadrants, no masses, hepatomegaly or splenomegaly  Neurological - normal speech, Right-sided facial palsy the  Extremities - peripheral

## 2021-01-02 NOTE — PROGRESS NOTES
The pt was discharged to home. The discharge instructions were given and reviewed with the pt. He was escorted to the exit in stable condition.

## 2021-01-02 NOTE — PLAN OF CARE
Problem: Falls - Risk of:  Goal: Will remain free from falls  Description: Will remain free from falls  Outcome: Ongoing  Goal: Absence of physical injury  Description: Absence of physical injury  Outcome: Ongoing     Problem: Safety:  Goal: Free from accidental physical injury  Description: Free from accidental physical injury  Outcome: Ongoing     Problem: Daily Care:  Goal: Daily care needs are met  Description: Daily care needs are met  Outcome: Ongoing     Problem: Pain:  Goal: Patient's pain/discomfort is manageable  Description: Patient's pain/discomfort is manageable  Outcome: Ongoing     Problem: Discharge Planning:  Goal: Patients continuum of care needs are met  Description: Patients continuum of care needs are met  Outcome: Ongoing

## 2021-01-02 NOTE — PLAN OF CARE
Problem: Daily Care:  Goal: Daily care needs are met  Description: Daily care needs are met  1/2/2021 1141 by Pierre Brady RN  Outcome: Ongoing  1/1/2021 2207 by Sherry Estevez RN  Outcome: Ongoing

## 2021-01-02 NOTE — PROGRESS NOTES
Occupational Therapy  DATE: 2021    NAME: Andreia Hays  MRN: [id number]   : 1970    Patient not seen this date for Occupationall Therapy due to:  [] Blood transfusion in progress  [] Cancel by RN  [] Hemodialysis  []  Refusal by Patient   [] Spine Precautions   [] Strict Bedrest  [] Surgery  [] Testing      [] Other        [x] OT being discontinued at this time. Patient independent. No further needs. -OT met with pt and pt reported that he is IND with all ADLs and has no concerns for therapy to address at this time. [] OT being discontinued at this time as the patient has been transferred to hospice care. No further needs.     Deepa Brush, OT

## 2021-01-04 LAB
EKG ATRIAL RATE: 98 BPM
EKG P AXIS: 58 DEGREES
EKG P-R INTERVAL: 166 MS
EKG Q-T INTERVAL: 360 MS
EKG QRS DURATION: 102 MS
EKG QTC CALCULATION (BAZETT): 459 MS
EKG R AXIS: -54 DEGREES
EKG T AXIS: 61 DEGREES
EKG VENTRICULAR RATE: 98 BPM

## 2021-02-03 ENCOUNTER — OFFICE VISIT (OUTPATIENT)
Dept: NEUROLOGY | Age: 51
End: 2021-02-03
Payer: COMMERCIAL

## 2021-02-03 VITALS
SYSTOLIC BLOOD PRESSURE: 129 MMHG | BODY MASS INDEX: 42.23 KG/M2 | HEART RATE: 93 BPM | WEIGHT: 295 LBS | HEIGHT: 70 IN | TEMPERATURE: 97.9 F | DIASTOLIC BLOOD PRESSURE: 84 MMHG

## 2021-02-03 DIAGNOSIS — G51.0 RIGHT-SIDED BELL'S PALSY: ICD-10-CM

## 2021-02-03 PROBLEM — I63.9 ACUTE CVA (CEREBROVASCULAR ACCIDENT) DUE TO SICKLE-CELL DISEASE (HCC): Status: RESOLVED | Noted: 2021-01-01 | Resolved: 2021-02-03

## 2021-02-03 PROBLEM — D57.1 ACUTE CVA (CEREBROVASCULAR ACCIDENT) DUE TO SICKLE-CELL DISEASE (HCC): Status: RESOLVED | Noted: 2021-01-01 | Resolved: 2021-02-03

## 2021-02-03 PROCEDURE — 99203 OFFICE O/P NEW LOW 30 MIN: CPT | Performed by: NURSE PRACTITIONER

## 2021-02-03 RX ORDER — CLINDAMYCIN HYDROCHLORIDE 300 MG/1
CAPSULE ORAL
COMMUNITY
Start: 2021-01-05

## 2021-02-03 RX ORDER — FUROSEMIDE 20 MG/1
TABLET ORAL
COMMUNITY
Start: 2020-11-12

## 2021-02-03 RX ORDER — LEVOFLOXACIN 500 MG/1
TABLET, FILM COATED ORAL
COMMUNITY
Start: 2021-01-28

## 2021-02-03 NOTE — PROGRESS NOTES
[facility]            [address]          [facility], [address] [facility]          Dept: [phone]          Dept Fax: [phone]        MD Desiree Dillon MD Ahmed B. Raquel Flores, MD Flavio Birks, MD Maretta Retort, MD Beatriz Nevarez, CNP            2/3/2021      HISTORY OF PRESENT ILLNESS:       I had the pleasure of seeing Lorena Hayes, who returns for continuing neurologic care. The patient was seen in the hospital by Dr. Kirt Abraham on 1/1/2021 after he presented to the ED with right-sided facial droop and a headache. The patient first noticed that the right side of of his face was not moving properly. He also noticed that his right eye was irritated for a day prior to going to the ED. His headache was in the posterior auricular region. He was diagnosed with right-sided Bell's Palsy. He had an MRI that did not reveal any acute abnormalities. Today, the patient still has some right-sided facial droop. He does report that the facial droop has been improving. He is unable to close the right eye completely. He was given some eye drops by his opthamologist to help keep the right eye moisturized. The patient admits to some blurry vision that clears up after a few moments. The patient is a  and has been out of work. He does admit to sensitivity to the sunlight. The patient also admits to migraines that are located in the right parietal region. The started when he had the right facial droop. He uses Tylenol which relieves the headache. He also uses a cold washcloth over his eyes. He is getting these headaches 2-3 times a week. They are associated with photophobia, phonophobia, nausea, and dizziness. The patient has been battling an ongoing right ear infection. He was told he has some water build up in the ear.     The patient has been diagnosed with sleep apnea but he has not started using his CPAP machine.       Testing reviewed:  MRI Brain 1/1/21  Impression   Unremarkable exam         CT head 12/31/20  Impression   No acute intracranial abnormality.               PAST MEDICAL HISTORY:         Diagnosis Date    Arthritis     GENERALIZED ALL OVER BODY    Diabetes mellitus ([address] 2006    NIDDM ON RX    Difficult intravenous access     ED (erectile dysfunction)     Gout 2009    ON RX    Hyperlipidemia 2006    ON RX    Hypertension 2006    ON RX    Kidney stone 02/2019    LEFT KIDNEY    Kidney stone 10/2019    RIGHT    Prolonged emergence from general anesthesia         PAST SURGICAL HISTORY:         Procedure Laterality Date    CARPAL TUNNEL RELEASE Bilateral     LEFT 1996, RIGHT 1997    CYSTO/URETERO/PYELOSCOPY, CALCULUS [address] [address] Left 2/7/2019    HOLMIUM - CYSTOSCOPY, URETEROSCOPY, LITHOTRIPSY, STENT PLACEMENT performed by Suzy Hill MD at [address], [facility] [address] Right 11/19/2019    HOLMIUM-CYSTO, URETEROSCOPY, LITHOTRIPSY, STENT PLACEMENT performed by Suzy Hill MD at [address], [facility] [address] Right 11/27/2019    HOLMIUM- CYSTOSCOPY, URETEROSCOPY, LITHOTRIPSY, STENT EXCHANGE RIGHT URETER performed by Suzy Hill MD at [address] [facility] Left 1/20/2019    CYSTOSCOPY URETERAL STENT INSERTION performed by Suzy Hill MD at [address] [place] [address] 02/12/2019    stent removal    CYSTOSCOPY Right 11/27/2019    HOLMIUM- CYSTOSCOPY, URETEROSCOPY, LITHOTRIPSY, STENT EXCHANGE RIGHT URETER    CYSTOSCOPY INSERTION / REMOVAL STENT / STONE Left 2/12/2019    CYSTOSCOPY, STENT REMOVAL performed by Suzy Hill MD at [address]  11/19/2019    hollium laser    KNEE ARTHROSCOPY Right 1983    X 2    KNEE SURGERY Right 1983     X 2    LIPOMA RESECTION Right     X 2 AXILLA    LITHOTRIPSY Left 02/07/2019    HOLMIUM - CYSTOSCOPY, URETEROSCOPY, LITHOTRIPSY, STENT PLACEMENT     LITHOTRIPSY Right (VASCEPA) 1 g CAPS capsule Take 2 capsules by mouth 2 times daily      glipiZIDE (GLUCOTROL XL) 10 MG extended release tablet Take 10 mg by mouth 2 times daily      simvastatin (ZOCOR) 20 MG tablet Take 20 mg by mouth nightly.  carvedilol (COREG) 6.25 MG tablet Take 6.25 mg by mouth daily       aspirin 81 MG tablet Take 81 mg by mouth daily STOPPED TAKING 02/09/2020.  furosemide (LASIX) 20 MG tablet       potassium citrate (UROCIT-K) 10 MEQ (1080 MG) extended release tablet Take 1 tablet by mouth 3 times daily (with meals) (Patient taking differently: Take 10 mEq by mouth 3 times daily (with meals) 03/12/2020 NOT TAKING) 90 tablet 3     No current facility-administered medications for this visit. ALLERGIES:     Allergies   Allergen Reactions    Penicillins Anaphylaxis     Throat swells    Percocet [Oxycodone-Acetaminophen] Anaphylaxis                                 REVIEW OF SYSTEMS        All items selected indicate a positive finding. Those items not selected are negative.   Constitutional [] Weight loss/gain   [] Fatigue  [] Fever/Chills   HEENT [] Hearing Loss  [x] Visual Disturbance  [] Tinnitus  [] Eye pain   Respiratory [] Shortness of Breath  [] Cough  [] Snoring   Cardiovascular [] Chest Pain  [] Palpitations  [] Lightheaded   GI [] Constipation  [] Diarrhea  [] Swallowing change  [] Nausea/vomiting    [] Urinary Frequency  [] Urinary Urgency   Musculoskeletal [] Neck pain  [] Back pain  [] Muscle pain  [] Restless legs   Dermatologic [] Skin changes   Neurologic [] Memory loss/confusion  [] Seizures  [] Trouble walking or imbalance  [x] Dizziness  [x] Sleep disturbance  [] Weakness  [] Numbness  [] Tremors  [] Speech Difficulty  [x] Headaches  [x] Light Sensitivity  [x] Sound Sensitivity   Endocrinology []Excessive thirst  []Excessive hunger   Psychiatric [] Anxiety/Depression  [] Hallucination   Allergy/immunology []Hives/environmental allergies   Hematologic/lymph [] Abnormal bleeding  [] Abnormal bruising         PHYSICAL EXAMINATION:       Vitals:    02/03/21 0955   BP: 129/84   Pulse: 93   Temp: 97.9 °F (36.6 °C)                                              .                                                                                                     General Appearance:  Alert, cooperative, no signs of distress, appears stated age   Head:  Normocephalic, no signs of trauma   Eyes:  Conjunctiva/corneas clear;  eyelids intact   Ears:  Normal external ear and canals   Nose: Nares normal, mucosa normal, no drainage    Throat: Lips and tongue normal; teeth normal;  gums normal   Neck: Supple, intact flexion, extension and rotation;   trachea midline;  no adenopathy;   thyroid: not enlarged;   no carotid pulse abnormality   Back:   Symmetric, no curvature, ROM adequate   Lungs:   Respirations unlabored   Heart:  Regular rate and rhythm           Extremities: Extremities normal, no cyanosis, no edema   Pulses: Symmetric over head and neck   Skin: Skin color, texture normal, no rashes, no lesions                                     NEUROLOGIC EXAMINATION    Neurologic Exam  Mental status    Alert and oriented x 3; intact memory with no confusion, speech or language problems; no hallucinations or delusions  Fund of information appropriate for level of education    Cranial nerves    II - visual fields intact to confrontation bilaterally  III, IV, VI - extra-ocular muscles full: no pupillary defect; no BETSY, no nystagmus, no ptosis   V - normal facial sensation                                                               VII -lower motor neuron facial droop                                                            VIII - intact hearing                                                                             IX, X - symmetrical palate                                                                  XI - symmetrical shoulder shrug                                                       XII - tongue midline without atrophy or fasciculation      Motor function  Normal muscle bulk and tone; strength 5/5 on all 4 extremities, no pronator drift      Sensory function Intact to light touch, pinprick, vibration, proprioception on all 4 extremities      Cerebellar Intact fine motor movement. No involuntary movements or tremors. No ataxia or dysmetria on finger to nose or heel to shin testing      Reflex function DTR 2+ on bilateral UE and LE, symmetric. Negative Babinski      Gait                   normal base and arm swing                  Medical Decision Making: In summary, your patient, Cain Estrada exhibits the following, with associated plan:    1. Bell's Palsy, the patient presented to the ED with right-sided facial droop. The patient is unable to close his right eye all the way. The patient admits to visual disturbances such as blurry vision. 1. Continue with the use of eye drops and follow-up visits with the ophthalmologist  2. The patient would like to go back to work from a neurological standpoint. The patient will be seeing his primary care provider next week. 3. The patient will return in 1 month for reevaluation  2. New onset migraine headaches, located in the right parietal area. The patient is currently getting 2 migraines a week. 1. The patient does not wish to start any medication at this time. Consider using a prophylactic medication if the migraines do not subside. Signed: Burt Mendoza CNP      *Please note that portions of this note were completed with a voice recognition program.  Although every effort was made to insure the accuracy of this automated transcription, some errors in transcription may have occurred, occasionally words and are mis-transcribed    Provider Attestation: The documentation recorded by the scribe accurately reflects the service I personally performed and the decisions made by myself. Portions of this note were transcribed by a scribe.  I personally performed the history, physical exam, and medical decision-making and confirm the accuracy of the information in the transcribed note. Scribe Attestation:   By signing my name below, Paige Vizcarra, attest that this documentation has been prepared under the direction and in the presence of Iraida Alcaraz CNP.

## 2021-02-03 NOTE — LETTER
[id number] [facility]  [address] [place] [id number]  Phone: [phone]  Fax: [phone]    RIKKI Silveira CNP        February 3, 2021     Patient: Bryce Lux   YOB: 1970   Date of Visit: 2/3/2021       To Whom It May Concern: It is my medical opinion that Jayden Go is cleared neurologically for return to work, depending upon current treatment being provided by his primary care provider. If you have any questions or concerns, please don't hesitate to call.     Sincerely,        RIKKI Silveira CNP

## 2022-05-18 ENCOUNTER — HOSPITAL ENCOUNTER (OUTPATIENT)
Dept: PHYSICAL THERAPY | Age: 52
Setting detail: THERAPIES SERIES
Discharge: HOME | End: 2022-05-18
Payer: COMMERCIAL

## 2022-05-18 PROCEDURE — 97162 PT EVAL MOD COMPLEX 30 MIN: CPT

## 2022-05-18 PROCEDURE — 97110 THERAPEUTIC EXERCISES: CPT

## 2022-05-18 NOTE — CONSULTS
[x] [facility]  [address]  [phone]  F: [phone]         Physical Therapy Spine Evaluation    Date:  2022  Patient: Valentin Lopes  : 1970  MRN: [id number]  Physician: Samir Plaza MD     Insurance: Worker's Comp, (Presumptive auth for 9-12 tx)  Medical Diagnosis:  LB muscle strain ICD-10: S39.012A    Rehab Codes: R 53.82, R 26.89, M 54.59  Onset Date: 22  Next 's appt.: 22    Subjective:   CC: R low back pain  HPI: (22) pt hurt his low back at work. He was pushing a crank over when it let loose and caused him to have to catch him self straining his lower back. Pt reports having trouble sleeping. Pt has been off work since the incident. Pt job at a  requires him to lift and move things about 6 times a day. Pt does not tolerate sitting for a long time, laying down on his back, or flexion and extension of the spine. PMHx: [] Unremarkable [x] Diabetes [x] HTN  [] Pacemaker   [] MI/Heart Problems [] Cancer [x] Arthritis [x] Other:Bell's Palsy, carpel tunnel release L , R , gout              [x] Refer to full medical chart  In EPIC       Comorbidities:   [x] Obesity [] Dialysis  [] N/A   [] Asthma/COPD [] Dementia [] Other:   [] Stroke [x] Sleep apnea [] Other:   [] Vascular disease [] Rheumatic disease [] Other:     Tests: [] X-Ray: [] MRI:  [] Other:    Medications: [x] Refer to full medical record [] None [x] Other: metformin, allopurinol  Allergies:      [x] Refer to full medical record [] None [x] Other: Penicillins, Percocet    Function:    Employer [employer]   Job Status []  Normal duty   [] Light duty   [x] Off due to condition    []  Retired   [] Not employed   [] Disability  [] Other:  []  Return to work:    Work activities/duties , lifting, pushing       ADL/IADL Previous level of function Current level of function Who currently assists the patient with task   Bathing [x] Independent  [] Assist [x] Independent  [] Assist    Dress/grooming [x] Independent  [] Assist [x] Independent  [] Assist    Transfer/mobility [x] Independent  [] Assist [x] Independent  [] Assist    Feeding [x] Independent  [] Assist [x] Independent  [] Assist    Toileting [x] Independent  [] Assist [x] Independent  [] Assist    Driving [x] Independent  [] Assist [x] Independent  [] Assist    Housekeeping [x] Independent  [] Assist [x] Independent  [] Assist    Grocery shop/meal prep [x] Independent  [] Assist [x] Independent  [] Assist      Gait Prior level of function Current level of function    [x] Independent  [] Assist [x] Independent  [] Assist   Device: [] Independent [] Independent    [x] Straight Cane [] Quad cane [x] Straight Cane-due to faituge [] Quad cane    [] Standard walker [] Rolling walker   [] 4 wheeled walker [] Standard walker [] Rolling walker   [] 4 wheeled walker    [] Wheelchair [] Wheelchair     Pain:  [x] Yes  [] No Location: R LB Pain Rating: (0-10 scale) 8/10  Pain altered Tx:  [] Yes  [x] No  Action:    Symptoms:  [x] Improving [] Worsening [] Same    Sleep: [] OK    [x] Disturbed    Objective:      STRENGTH  STRENGTH  ROM    right left  right left Cervical    C5 Shld Abd   L1-2 Hip Flex 4- p 4 Flexion    Shld Flexion   Hip Abd   Extension    Shld IR   L3-4 Knee Ext 4 p 4 Rotation L R   Shld ER   L4 Ankle DF 4 p 5 Sidebend L R   C6 Elb Flex   L5 EHL   Retraction    C7 Elb Ext   S1 Plant.  Flex 4- 4+ p Lumbar    C8 EPL   Abdominals   Flexion 30   T1 Fing Abd   Erector Spinae   Extension 10         Rotation L - pain increase R         Sidebend L- impaired R- more         Hands on Knees Increase pressure                                            UE/LE                                                              TESTS (+/-) right left Not Tested   SLR [] sit [x] supine + + []   Hamstring (SLR)   []   SKTC + + []   DKTC   []       OBSERVATION No Deficit Deficit Not Tested Comments Posture       Forward Head [] [x] []    Rounded Shoulders [] [x] []    Lateral Shift [] [x] [] Leaned over to the right in sitting and standing   Palpation [] [x] [] Tenderness along R SI, R glute med, R LB,    Sensation [x] [] []    Edema [x] [] []    Neurological [x] [] []        Functional Test: Oswestry Score: 72 % functionally impaired     Comments:  Pt does not tolerate knee flexion in prone,     Assessment:  Patient would benefit from skilled physical therapy services in order to: decrease LBP, Increase LE strength, tolerate standing, sitting and moving, return to normal duty at work. Problems:    [x] ? Pain: 8/10 R LBP  [x] ? ROM: 30 Lumbar flexion, 10 lumbar extension  [x] ? Strength: decreased R LE strength with pain  [x] ? Function: 72% functionally impaired according to Oswestry  [x] Other:      STG: (to be met in 9 treatments)  1. ? Pain: 6/10  2. ? ROM:35 lumbar flexion, 15 lumbar extension  3. ? Strength: 4 R hip flexion without pain, 4+ R knee extension without pain, 4+ R DF without pain, 4 PF without pain  4. ? Function: 60 %  functionally impaired according to Oswestry  5. Patient to be independent with home exercise program as demonstrated by performance with correct form without cues. Patient goals:\"to feel better\"    Rehab Potential:  [] Good  [x] Fair  [] Poor   Suggested Professional Referral:  [x] No  [] Yes:  Barriers to Goal Achievement:  [x] No  [] Yes:  Domestic Concerns:  [x] No  [] Yes:    Pt. Education:  [x] Plans/Goals, Risks/Benefits discussed  [x] Home exercise program  Method of Education: [x] Verbal  [] Demo  [] Written  Comprehension of Education:  [x] Verbalizes understanding. [x] Demonstrates understanding. [] Needs Review. [] Demonstrates/verbalizes understanding of HEP/Ed previously given.     Treatment Plan:  [x] Therapeutic Exercise   60103  [] Iontophoresis: 4 mg/mL Dexamethasone Sodium Phosphate  mAmin  28624   [x] Therapeutic Activity  66812 [] Vasopneumatic cold with compression  [id number]    [x] Gait Training   [id number] [] Ultrasound   [id number]   [] Neuromuscular Re-education  [phone] [x] Electrical Stimulation Unattended  02305   [x] Manual Therapy  34147 [] Electrical Stimulation Attended  [id number]   [x] Instruction in HEP  [x] Lumbar/Cervical Traction  [id number]   [] Aquatic Therapy   48030 [x] Cold/hotpack    [] Massage   61845      [] Dry Needling, 1 or 2 muscles  10048   [] Biofeedback, first 15 minutes   51639  [] Biofeedback, additional 15 minutes   16552 [] Dry Needling, 3 or more muscles  22406     []  Medication allergies reviewed for use of    Dexamethasone Sodium Phosphate 4mg/ml     with iontophoresis treatments. Pt is not allergic. Frequency:  3 x/week for 9 visits    Todays Treatment:  Modalities:   Precautions:  Exercises:  Exercise Reps/ Time Weight/ Level Comments   JEFF 5 min     Glute set prone 10x 3''    Prone heel ball squeeze    5''    scap retraction 10x 5''          Other: education on use of cane including which side to use it on    Specific Instructions for next treatment: begin core strengthening exercise and attempt to increase motion in spine.        Evaluation Complexity:  History (Personal factors, comorbidities) [] 0 [] 1-2 [x] 3+   Exam (limitations, restrictions) [] 1-2 [] 3 [x] 4+   Clinical presentation (progression) [x] Stable [] Evolving  [] Unstable   Decision Making [] Low [x] Moderate [] High    [] Low Complexity [x] Moderate Complexity [] High Complexity       Treatment Charges: Mins Units   [x] Evaluation       []  Low       [x]  Moderate       []  High 42 1   []  Modalities     [x]  Ther Exercise 10 1   []  Manual Therapy     []  Ther Activities     []  Aquatics     []  Vasocompression     [x]  Other: gait training 3 0     TOTAL TREATMENT TIME: 55    Time in: 6966      Time out: 1502    Electronically signed by: Dustin Galicia  Patient evaluated and treated, and note written by Dustin Galicia, Student PT under direct supervision of Pepper Fried PT. Physician Signature:________________________________Date:__________________  By signing above or cosigning this note, I have reviewed this plan of care and certify a need for medically necessary rehabilitation services.      *PLEASE SIGN ABOVE AND FAX BACK ALL PAGES*

## 2022-05-20 ENCOUNTER — HOSPITAL ENCOUNTER (OUTPATIENT)
Dept: PHYSICAL THERAPY | Age: 52
Setting detail: THERAPIES SERIES
Discharge: HOME | End: 2022-05-20
Payer: COMMERCIAL

## 2022-05-20 PROCEDURE — 97110 THERAPEUTIC EXERCISES: CPT

## 2022-05-20 NOTE — FLOWSHEET NOTE
[x] [facility]) [facility]  [address]  [phone]  F: [phone] [] [address]  [facility]   [address]  P: [phone]  F: [phone] [] [address]  [address]  P: [phone]  F: [phone] [] [address]  P: [phone]  F: [phone] [] [address]  [facility]   [address]   [place]: [phone]  F: [phone]      Physical Therapy Daily Treatment Note    Date:  2022  Patient Name:  Venecia Abreu    :  1970  MRN: [id number]  Physician: Leslie Victoria MD                                 Insurance: Worker's Comp, (Presumptive auth for 9-12 tx)  Medical Diagnosis:  LB muscle strain ICD-10: S39.012A                 Rehab Codes: R 53.82, R 26.89, M 54.59  Onset Date: 22                Next 's appt.: 22  Visit# / total visits: 2/     Cancels/No Shows: 0/0    Subjective:    Pain:  [x] Yes  [] No Location: low back Pain Rating: (0-10 scale) 7/10  Pain altered Tx:  [x] No  [] Yes  Action:  Comments: Arrives noting pain 7/10 upon arrival. States he had pain after initial eval.     Objective:  Modalities: hot pack (large) to low back in seated for 15 minutes after exercises.   Precautions:  Exercises:  Exercise Reps/ Time Weight/ Level Comments   Nustep 2 mins L2 Stopped painful         Prone      Prone lying 5 mins     Glut sets 10x 3 sec Squeezing in low back   Hamstring curls 10x L/ 8x R     Heel ball squeeze 8x           Seated      LAQs 7x R/ 9x L     Marching 7x R/ 10x L           Other:      Treatment Charges: Mins Units   [x]  Modalities-HP 10 0   [x]  Ther Exercise 48 3   []  Manual Therapy     []  Ther Activities     []  Aquatics     []  Vasocompression     []  Other Total Treatment time 48 mins        Assessment: [x] Progressing toward goals. Initiated exercises per log this date per tolerance. Reviewed exercises previously given to patient as exercises are still new. Noted tightness in all planes of motion, thus limiting exercise reps secondary to pain. Ended with hot pack to low back to decrease pain. [] No change. [] Other:  [x] Patient would continue to benefit from skilled physical therapy services in order to:  decrease LBP, Increase LE strength, tolerate standing, sitting and moving, return to normal duty at work. STG: (to be met in 9 treatments)  1. ? Pain: 6/10  2. ? ROM:35 lumbar flexion, 15 lumbar extension  3. ? Strength: 4 R hip flexion without pain, 4+ R knee extension without pain, 4+ R DF without pain, 4 PF without pain  4. ? Function: 60 %  functionally impaired according to Oswestry  5. Patient to be independent with home exercise program as demonstrated by performance with correct form without cues.     Patient goals:\"to feel better\"    Pt. Education:  [x] Yes  [] No  [x] Reviewed Prior HEP/Ed  Method of Education: [x] Verbal  [x] Demo  [] Written  Comprehension of Education:  [x] Verbalizes understanding. [x] Demonstrates understanding. [x] Needs review. [] Demonstrates/verbalizes HEP/Ed previously given. Patient's HEP--prayer stretch, LTR, SKTC, DKTC, Pelvic tilt     Plan: [x] Continue current frequency toward long and short term goals. [x] Specific Instructions for subsequent treatments: begin core strengthening exercise and attempt to increase motion in spine.        Time In: 2:00 pm            Time Out: 3:01 pm    Electronically signed by:  Xena Mcadams PTA

## 2022-05-23 ENCOUNTER — HOSPITAL ENCOUNTER (OUTPATIENT)
Dept: PHYSICAL THERAPY | Age: 52
Setting detail: THERAPIES SERIES
Discharge: HOME | End: 2022-05-23
Payer: COMMERCIAL

## 2022-05-23 PROCEDURE — 97110 THERAPEUTIC EXERCISES: CPT

## 2022-05-23 NOTE — FLOWSHEET NOTE
[x] [facility]  [address]  [phone]  F: [phone]         Physical Therapy Daily Treatment Note    Date:  2022  Patient Name:  Venecia Abreu    :  1970  MRN: [id number]  Physician: Leslie Victoria MD                                 Insurance: Worker's Comp, (Presumptive auth for 9-12 tx)  Medical Diagnosis:  LB muscle strain ICD-10: S39.012A                 Rehab Codes: R 53.82, R 26.89, M 54.59  Onset Date: 22                Next 's appt.: 22  Visit# / total visits: 3/9     Cancels/No Shows: 0/0    Subjective:    Pain:  [x] Yes  [] No Location: low back Pain Rating: (0-10 scale) 7.5/10  Pain altered Tx:  [x] No  [] Yes  Action:  Comments: pt reports being sore today after falling out of his bed on Saturday night. Pt says this brought his pain to a 9/10. Pt reports last treatment going well. Pt reports low sensation in lower back area    Objective:  Modalities: hot pack (large) to low back in seated for 15 minutes after exercises. Precautions:  Exercises:  Exercise Reps/ Time Weight/ Level Comments   Nustep 5 mins L1          Prone      Prone lying 5 mins     Glut sets 10x 3 sec Squeezing in low back, very low glute activation via palpation   Hamstring curls      Heel ball squeeze 10x2  Only got 7 reps second set   JEFF   Pt notes onset of numbness localized to lower spine   multifidis 5x                 Seated      LAQs 9x     Marching 10x           supine      Abdominal isometric 8x2 5''                Other: pt pressure feeling in low back increases with both traction and compression of spine       Treatment Charges: Mins Units   [x]  Modalities-HP 15 --   [x]  Ther Exercise 40 3   []  Manual Therapy     []  Ther Activities     []  Aquatics     []  Vasocompression     []  Other     Total Treatment time 40 3       Assessment: [x] Progressing toward goals.  Pt is very apprehensive to any spine movement reporting pressure in his lower back. Pt says pain is appearing in news spots since start of tx. Pt displayed hypersensitivity along low back. Pt is able to progress to new exercises but pain is still there. Pt continued HP for pain at end of tx.     [] No change. [] Other:  [x] Patient would continue to benefit from skilled physical therapy services in order to:  decrease LBP, Increase LE strength, tolerate standing, sitting and moving, return to normal duty at work. STG: (to be met in 9 treatments)  1. ? Pain: 6/10  2. ? ROM:35 lumbar flexion, 15 lumbar extension  3. ? Strength: 4 R hip flexion without pain, 4+ R knee extension without pain, 4+ R DF without pain, 4 PF without pain  4. ? Function: 60 %  functionally impaired according to Oswestry  5. Patient to be independent with home exercise program as demonstrated by performance with correct form without cues.     Patient goals:\"to feel better\"    Pt. Education:  [x] Yes  [] No  [x] Reviewed Prior HEP/Ed  Method of Education: [x] Verbal  [x] Demo  [] Written  Comprehension of Education:  [x] Verbalizes understanding. [x] Demonstrates understanding. [x] Needs review. [] Demonstrates/verbalizes HEP/Ed previously given. Patient's HEP--prayer stretch, LTR, SKTC, DKTC, Pelvic tilt     Plan: [x] Continue current frequency toward long and short term goals. [x] Specific Instructions for subsequent treatments: begin core strengthening exercise and attempt to increase motion in spine.        Time In: 1400            Time Out: 1505    Electronically signed by:  Cheng Oneal  Patient treated and note written by Cheng Oneal, Student PT under direct supervision of Sherri Aguiar, PT.

## 2022-05-25 ENCOUNTER — HOSPITAL ENCOUNTER (OUTPATIENT)
Dept: PHYSICAL THERAPY | Age: 52
Setting detail: THERAPIES SERIES
Discharge: HOME | End: 2022-05-25
Payer: COMMERCIAL

## 2022-05-25 PROCEDURE — 97110 THERAPEUTIC EXERCISES: CPT

## 2022-05-25 NOTE — FLOWSHEET NOTE
[x] [facility]  [address]  [phone]  F: [phone]         Physical Therapy Daily Treatment Note    Date:  2022  Patient Name:  King Neto    :  1970  MRN: [id number]  Physician: Marily Carmen MD                                 Insurance: Worker's Comp, (Presumptive auth for 9-12 tx)  Medical Diagnosis:  LB muscle strain ICD-10: S39.012A                 Rehab Codes: R 53.82, R 26.89, M 54.59  Onset Date: 22                Next 's appt.: 22  Visit# / total visits:      Cancels/No Shows: 0/0    Subjective:    Pain:  [x] Yes  [] No Location: low back Pain Rating: (0-10 scale) 6/10  Pain altered Tx:  [x] No  [] Yes  Action:  Comments: Pt comes in reporting being a little sore. Pt attempted to go to the grocery store yesterday to test out back. Pt was unable to complete trip and had to go sit in the car until his wife completed the shopping. Pt took tylenol before tx today and says it is helping. Objective:  Modalities: hot pack (large) to low back in seated for 15 minutes after exercises. Precautions:  Exercises:  Exercise Reps/ Time Weight/ Level Comments   Nustep 5 mins L1          Prone      Prone lying 5 mins     Glut sets 10x2 3 sec Squeezing in low back, very low glute activation via palpation   Hamstring curls 10x  Limited ROM to avoid pain only 6 reps on right leg   Heel ball squeeze 10x3     JEFF 10x  Pt notes onset of numbness localized to lower spine   multifidis                  Seated      LAQs 10x L  7x R     Marching 10x           supine      Abdominal isometric 8x2 5''    wedge ~ 1 min  Pt found relief initially with wedge then pain increased.                                        Other: pt pressure feeling in low back increases with both traction and compression of spine   Traction pain runs from lower back to back knee      Treatment Charges: Mins Units   [x]  Modalities-HP 15 --   [x]  Ther Exercise 43 3   []  Manual Therapy     []  Ther Activities     []  Aquatics     []  Vasocompression     []  Other     Total Treatment time 43 3       Assessment: [x] Progressing toward goals. Pt tolerance for exercise is increasing minimally and pain and movement is about the same. Core exercises were tolerated but not able to be increased. Slowly progressing movement and finding positions that give pain relief should be continued. [] No change. [] Other:  [x] Patient would continue to benefit from skilled physical therapy services in order to:  decrease LBP, Increase LE strength, tolerate standing, sitting and moving, return to normal duty at work. STG: (to be met in 9 treatments)  1. ? Pain: 6/10  2. ? ROM:35 lumbar flexion, 15 lumbar extension  3. ? Strength: 4 R hip flexion without pain, 4+ R knee extension without pain, 4+ R DF without pain, 4 PF without pain  4. ? Function: 60 %  functionally impaired according to Oswestry  5. Patient to be independent with home exercise program as demonstrated by performance with correct form without cues.     Patient goals:\"to feel better\"    Pt. Education:  [x] Yes  [] No  [x] Reviewed Prior HEP/Ed  Method of Education: [x] Verbal  [x] Demo  [] Written  Comprehension of Education:  [x] Verbalizes understanding. [x] Demonstrates understanding. [x] Needs review. [] Demonstrates/verbalizes HEP/Ed previously given. Patient's HEP--prayer stretch, LTR, SKTC, DKTC, Pelvic tilt     Plan: [x] Continue current frequency toward long and short term goals. [x] Specific Instructions for subsequent treatments: begin core strengthening exercise and attempt to increase motion in spine.        Time In: 1300            Time Out: [facility]    Electronically signed by:  Roseanne Lopez  Patient treated and note written by Roseanne Lopez, Student PT under direct supervision of Javon Lemos PT.

## 2022-05-27 ENCOUNTER — HOSPITAL ENCOUNTER (OUTPATIENT)
Dept: PHYSICAL THERAPY | Age: 52
Setting detail: THERAPIES SERIES
Discharge: HOME | End: 2022-05-27
Payer: COMMERCIAL

## 2022-05-27 PROCEDURE — 97110 THERAPEUTIC EXERCISES: CPT

## 2022-05-27 NOTE — FLOWSHEET NOTE
[x] [facility]  [address]  [phone]  F: [phone]         Physical Therapy Daily Treatment Note    Date:  2022  Patient Name:  Asmita Snowden    :  1970  MRN: [id number]  Physician: Nicole Friend MD                                 Insurance: Worker's Comp, (Presumptive auth for 9-12 tx)  Medical Diagnosis:  LB muscle strain ICD-10: S39.012A                 Rehab Codes: R 53.82, R 26.89, M 54.59  Onset Date: 22                Next 's appt.: 22  Visit# / total visits:      Cancels/No Shows: 0/0     Subjective:    Pain:  [x] Yes  [] No Location: low back Pain Rating: (0-10 scale) 6-7/10  Pain altered Tx:  [x] No  [] Yes  Action:  Comments: Pt reports taking it easy the last few days and his back feels slightly better. Pt still says pain is pretty bad and wants it to be better. Objective:  Modalities: hot pack (large) to low back in seated for 15 minutes after exercises. Precautions:  Exercises:  Exercise Reps/ Time Weight/ Level Comments   Nustep 5 mins L1          Prone      Prone lying 5 mins     Glut sets 10x2 3 sec Squeezing in low back, very low glute activation via palpation   Hamstring curls 10x2  Limited ROM to avoid pain only 10 reps on right leg   Heel ball squeeze 10x3     JEFF 7x  Pt notes onset of numbness localized to lower spine   multifidis                  Seated      LAQs 9x     Marching 10x     Alt arm raise 9x                       supine      Abdominal isometric 8x3 5''    wedge ~ 1 min  Pt found relief initially with wedge then pain increased.          quadraped   Not tolerated                     Standing      Wall slide  2x  Attempted but not tolerated                                 Other: pt pressure feeling in low back increases with both traction and compression of spine   Traction pain runs from lower back to back knee      Treatment Charges: Mins Units   [x]  Modalities-HP 15 --   [x] Ther Exercise 38 3   []  Manual Therapy     []  Ther Activities     []  Aquatics     []  Vasocompression     []  Other     Total Treatment time 38 3       Assessment: [x] Progressing toward goals. Pt pain and tolerance to movement isn't progressing as pt is still very sensitive to any spinal movement and unable to increase exercise reps due to pain. Attempted to have patient try standing exercises but they were not tolerated due to squeezing sensation in lower back. Pt says he is feeling a little better but does not show this in treatment. Pt continues with HP after tx to provide pain relief. [] No change. [] Other:  [x] Patient would continue to benefit from skilled physical therapy services in order to:  decrease LBP, Increase LE strength, tolerate standing, sitting and moving, return to normal duty at work. STG: (to be met in 9 treatments)  1. ? Pain: 6/10  2. ? ROM:35 lumbar flexion, 15 lumbar extension  3. ? Strength: 4 R hip flexion without pain, 4+ R knee extension without pain, 4+ R DF without pain, 4 PF without pain  4. ? Function: 60 %  functionally impaired according to Oswestry  5. Patient to be independent with home exercise program as demonstrated by performance with correct form without cues.     Patient goals:\"to feel better\"    Pt. Education:  [x] Yes  [] No  [x] Reviewed Prior HEP/Ed  Method of Education: [x] Verbal  [x] Demo  [] Written  Comprehension of Education:  [x] Verbalizes understanding. [x] Demonstrates understanding. [x] Needs review. [] Demonstrates/verbalizes HEP/Ed previously given. Patient's HEP--prayer stretch, LTR, SKTC, DKTC, Pelvic tilt     Plan: [x] Continue current frequency toward long and short term goals.     [x] Specific Instructions for subsequent treatments:core strengthening exercise and attempt to increase motion in spine, attempt standing exercise if tolerated      Time In: 1300            Time Out: 1405    Electronically signed by:  Roseanne Lopez Patient treated and note written by Jorden Long, Student PT under direct supervision of Kimberly Patel, PT.

## 2022-05-31 ENCOUNTER — HOSPITAL ENCOUNTER (OUTPATIENT)
Dept: PHYSICAL THERAPY | Age: 52
Setting detail: THERAPIES SERIES
Discharge: HOME | End: 2022-05-31
Payer: COMMERCIAL

## 2022-05-31 PROCEDURE — 97110 THERAPEUTIC EXERCISES: CPT

## 2022-05-31 PROCEDURE — G0283 ELEC STIM OTHER THAN WOUND: HCPCS

## 2022-05-31 NOTE — PROGRESS NOTES
[x] [facility]  [address]  [phone]  F: [phone] [] [address]  [place] [address]   [address]  P: [phone]  F: [phone] [] [address] &  Therapy  [address]  P: [phone]  F: [phone] [] [address]  P: [phone]  F: [phone] [] [address]  [facility]   [address]   [place]: [phone]  F: [phone]      Physical Therapy Progress Note    Date: 2022      Patient: Zarina Romero  : 1970  MRN: [id number]    Clearence Cheadle, MD                                 Insurance: Worker's Comp, (Presumptive auth for 9-12 tx)  Medical Diagnosis:  LB muscle strain ICD-10: S39.012A                 Rehab Codes: R 53.82, R 26.89, M 54.59  Onset Date: 22                Next 's appt.: 22  Visit# / total visits:                                     Cancels/No Shows: 0/0   Date range of services: 22 to 22      Subjective:  Pain:  [x]? Yes  []? No   Location: low back      Pain Rating: (0-10 scale) 7/10  Pain altered Tx:  []? No  [x]? Yes  Action: to the pt's tolerance  Comments: States that his pain is still very severe resulting in significant functional difficulties. Reports positive sleep disturbances. Pt reports onset of shaking in his right hand as well as his right leg due to pain that started two weeks into therapy. Pt also reports onset of lower back numbness that started two weeks into therapy. Objective:  Test Measurements: Lumbar ROM: Flex 30° (no change), Ext 10° (no change)  Function: No change. Pt has great difficulty with all functional activities and reports sleep disturbances.     Assessment:  STG: (to be met in 9 treatments)  1. ? Pain: 6/10--NOT MET  2. ? ROM:35 lumbar flexion, 15 lumbar extension-NOT MET  3. ? Strength: 4 R hip flexion without pain, 4+ R knee extension without pain, 4+ R DF without pain, 4 PF without pain--NOT MET  4. ? Function: 60 %  functionally impaired according to Oswestry--NOT MET  5. Patient to be independent with home exercise program as demonstrated by performance with correct form without cues. Treatment Plan:  [x] Therapeutic Exercise   80561  [] Iontophoresis: 4 mg/mL Dexamethasone Sodium Phosphate  mAmin  08104   [] Therapeutic Activity  59848 [] Vasopneumatic cold with compression  63879    [] Gait Training   67467 [] Ultrasound   04736   [] Neuromuscular Re-education  93813 [x] Electrical Stimulation Unattended  33220   [] Manual Therapy  88571 [] Electrical Stimulation Attended  80676   [x] Instruction in HEP  [] Lumbar/Cervical Traction  35100   [] Aquatic Therapy   57598 [x] Cold/hotpack    [] Massage   48564      [] Dry Needling, 1 or 2 muscles  20496   [] Biofeedback, first 15 minutes   04608  [] Biofeedback, additional 15 minutes   60480 [] Dry Needling, 3 or more muscles  60483       Patient Status:     [x] Continue per initial plan of care x remaining 3 sessions. [x] Additional visits necessary. Pt will need a new C9 though if to continue with therapy beyond initial 9 visits. [] Other:    Requested Frequency/Duration: 2 times per week for 6 treatments. Electronically signed by Aidee Hickman PT on 5/31/2022 at 2:48 PM      If you have any questions or concerns, please don't hesitate to call. Thank you for your referral.    Physician Signature:________________________________Date:__________________  By signing above or cosigning this note, I have reviewed this plan of care and certify a need for medically necessary rehabilitation services.      *PLEASE SIGN ABOVE AND FAX BACK ALL PAGES*

## 2022-05-31 NOTE — FLOWSHEET NOTE
[x] [facility]  [address]  [phone]  F: [phone]         Physical Therapy Daily Treatment Note    Date:  2022  Patient Name:  Neto Machuca    :  1970  MRN: [id number]  Physician: Jessica Nichole MD                                 Insurance: Worker's Comp, (Presumptive auth for 9-12 tx)  Medical Diagnosis:  LB muscle strain ICD-10: S39.012A                 Rehab Codes: R 53.82, R 26.89, M 54.59  Onset Date: 22                Next 's appt.: 22  Visit# / total visits:      Cancels/No Shows: 0/0     Subjective:    Pain:  [x] Yes  [] No Location: low back Pain Rating: (0-10 scale) 7/10  Pain altered Tx:  [] No  [x] Yes  Action: to the pt's tolerance  Comments: States that his pain is still very severe resulting in significant functional difficulties. Reports positive sleep disturbances. Pt reports onset of shaking in his right hand as well as his right leg due to pain that started two weeks into therapy. Pt also reports onset of lower back numbness that started two weeks into therapy. Objective:  Modalities: hot pack (large) and UES to low back in seated for 20 minutes after exercises. Precautions:  Exercises:  Exercise Reps/ Time Weight/ Level Comments   Nustep 5 mins L1          Prone      Prone lying 5 mins     Glut sets 10x2 3 sec Squeezing in low back, very low glute activation via palpation   Hamstring curls 10x2  Limited ROM to avoid pain only 10 reps on right leg   Heel ball squeeze 10x3     JEFF 7x  Pt notes onset of numbness localized to lower spine   multifidis                  Seated      LAQs 8x     Marching 10x     Alt arm raise 10x     Lumbar flexion stretch 7x  Red stability ball               supine      Abdominal isometric 8x3 5''    wedge ~ 1 min  Pt found relief initially with wedge then pain increased.          quadraped   Not tolerated                     Standing      Wall slide  2x Attempted but not tolerated                                 Other: pt pressure feeling in low back increases with both traction and compression of spine   Traction pain runs from lower back to back knee    Lumbar ROM: Flex 30° (no change), Ext 10° (no change)    Treatment Charges: Mins Units   [x]  Modalities-HP 20 0/1   [x]  Ther Exercise 45 3   []  Manual Therapy     []  Ther Activities     []  Aquatics     []  Vasocompression     []  Other     Total Treatment time 65 4       Assessment: [] Progressing toward goals. [x] No change. Pt with very slow, guarded movements within the clinic. During seated scapular retractions, therapist noticed that the pt's right hand had started to shake. When therapist questioned the pt on this he states that the shaking started two weeks into the therapy due to pain. Pt states that his right leg will shake as well. Added seated scapular retraction and seated lumbar flexion stretch using stability ball this date. Pt reports pain with all exercises. Added estim with the heat at the end of the session this date. [] Other:  [x] Patient would continue to benefit from skilled physical therapy services in order to:  decrease LBP, Increase LE strength, tolerate standing, sitting and moving, return to normal duty at work. STG: (to be met in 9 treatments)  1. ? Pain: 6/10--NOT MET  2. ? ROM:35 lumbar flexion, 15 lumbar extension-NOT MET  3. ? Strength: 4 R hip flexion without pain, 4+ R knee extension without pain, 4+ R DF without pain, 4 PF without pain--NOT MET  4. ? Function: 60 %  functionally impaired according to Oswestry--NOT MET  5. Patient to be independent with home exercise program as demonstrated by performance with correct form without cues.     Patient goals:\"to feel better\"    Pt. Education:  [x] Yes  [] No  [x] Reviewed Prior HEP/Ed  Method of Education: [x] Verbal  [x] Demo  [] Written  Comprehension of Education:  [x] Verbalizes understanding.   [x] Demonstrates understanding. [x] Needs review. [] Demonstrates/verbalizes HEP/Ed previously given. Patient's HEP--prayer stretch, LTR, SKTC, DKTC, Pelvic tilt     Plan: [x] Continue current frequency toward long and short term goals.     [x] Specific Instructions for subsequent treatments:core strengthening exercise and attempt to increase motion in spine, attempt standing exercise if tolerated      Time In: 1335           Time Out: 1452    Electronically signed by:  Casie Farr, PT

## 2022-06-01 ENCOUNTER — HOSPITAL ENCOUNTER (OUTPATIENT)
Dept: PHYSICAL THERAPY | Age: 52
Setting detail: THERAPIES SERIES
Discharge: HOME | End: 2022-06-01
Payer: COMMERCIAL

## 2022-06-01 PROCEDURE — 97110 THERAPEUTIC EXERCISES: CPT

## 2022-06-01 NOTE — FLOWSHEET NOTE
[x] [facility]  [address]  [phone]  F: [phone]         Physical Therapy Daily Treatment Note    Date:  2022  Patient Name:  Kim Chauhan    :  1970  MRN: [id number]  Physician: Karie Fabian MD                                 Insurance: Worker's Comp, (Presumptive auth for 9-12 tx)  Medical Diagnosis:  LB muscle strain ICD-10: S39.012A                 Rehab Codes: R 53.82, R 26.89, M 54.59  Onset Date: 22                Next 's appt.: 22  Visit# / total visits:      Cancels/No Shows: 0/0      Subjective:    Pain:  [x] Yes  [] No Location: low back Pain Rating: (0-10 scale) 7.5/10  Pain altered Tx:  [] No  [x] Yes  Action: to the pt's tolerance  Comments: pt went to the doctor before the visit and said he's getting an MRI done on his lower back. Pt says he's in a lot of pain today and the ES worked and went totally numb as the ES was finishing up. Pt reports novacane patches have been helping at home. Objective:  Modalities: hot pack (large) and UES to low back in seated for 20 minutes after exercises. Precautions:  Exercises:  Exercise Reps/ Time Weight/ Level Comments   Nustep 5 mins L4          Prone      Prone lying 5 mins     Glut sets 10x3 3 sec Squeezing in low back, very low glute activation via palpation   Hamstring curls 9x  Limited ROM to avoid pain   Heel ball squeeze 10x3     JEFF 8x  Pt notes onset of numbness localized to lower spine   multifidis                  Seated      LAQs 8x     Marching 10x     Alt arm raise 10x     Lumbar flexion stretch 7x  Red stability ball               supine      Abdominal isometric 10x2 5''    wedge ~ 1 min  Pt found relief initially with wedge then pain increased.          quadraped   Not tolerated                     Standing      Wall slide  2x  Attempted but not tolerated                                 Other: pt pressure feeling in low back increases with both traction and compression of spine   Traction pain runs from lower back to back knee    Lumbar ROM: Flex 30° (no change), Ext 10° (no change)    Treatment Charges: Mins Units   [x]  Modalities-HP 20 0   [x]  Ther Exercise 42 3   []  Manual Therapy     []  Ther Activities     []  Aquatics     []  Vasocompression     []  Other     Total Treatment time 62 3       Assessment: [] Progressing toward goals. [x] No change. pt continues to show little change with tolerance to exercise and pain in not improving. Pt continued heat to help with the pain after treatment. Pt wanted to wait to do ES until next tx.      [] Other:  [x] Patient would continue to benefit from skilled physical therapy services in order to:  decrease LBP, Increase LE strength, tolerate standing, sitting and moving, return to normal duty at work. STG: (to be met in 9 treatments)  1. ? Pain: 6/10--NOT MET  2. ? ROM:35 lumbar flexion, 15 lumbar extension-NOT MET  3. ? Strength: 4 R hip flexion without pain, 4+ R knee extension without pain, 4+ R DF without pain, 4 PF without pain--NOT MET  4. ? Function: 60 %  functionally impaired according to Oswestry--NOT MET  5. Patient to be independent with home exercise program as demonstrated by performance with correct form without cues.     Patient goals:\"to feel better\"    Pt. Education:  [x] Yes  [] No  [x] Reviewed Prior HEP/Ed  Method of Education: [x] Verbal  [x] Demo  [] Written  Comprehension of Education:  [x] Verbalizes understanding. [x] Demonstrates understanding. [x] Needs review. [] Demonstrates/verbalizes HEP/Ed previously given. Patient's HEP--change soon     Plan: [x] Continue current frequency toward long and short term goals.     [x] Specific Instructions for subsequent treatments:core strengthening exercise and attempt to increase motion in spine, attempt standing exercise if tolerated      Time In: 1300           Time Out: 1410    Electronically signed by:  Madison Dean Patient treated and note written by Khari Tamayo, Student PT under direct supervision of Brenda Reyes, PT.

## 2022-06-03 ENCOUNTER — HOSPITAL ENCOUNTER (OUTPATIENT)
Dept: PHYSICAL THERAPY | Age: 52
Setting detail: THERAPIES SERIES
Discharge: HOME | End: 2022-06-03
Payer: COMMERCIAL

## 2022-06-03 PROCEDURE — 97110 THERAPEUTIC EXERCISES: CPT

## 2022-06-03 NOTE — FLOWSHEET NOTE
[x] [facility]  [address]  [phone]  F: [phone]         Physical Therapy Daily Treatment Note    Date:  6/3/2022  Patient Name:  Kanu Sanders    :  1970  MRN: [id number]  Physician: Barney Hawkins MD                                 Insurance: Worker's Comp, (Presumptive auth for 9-12 tx)  Medical Diagnosis:  LB muscle strain ICD-10: S39.012A                 Rehab Codes: R 53.82, R 26.89, M 54.59  Onset Date: 22                Next 's appt.: 22  Visit# / total visits:      Cancels/No Shows: 0/0      Subjective:    Pain:  [x] Yes  [] No Location: low back Pain Rating: (0-10 scale) 7/10  Pain altered Tx:  [] No  [x] Yes  Action: to the pt's tolerance  Comments: pt reports his back being in a lot of pain today so he already took his pain pill. Pt says back has some days that are better than others. Objective:  Modalities: hot pack (large) and UES to low back in seated for 20 minutes after exercises.   Precautions:  Exercises:  Exercise Reps/ Time Weight/ Level Comments   Nustep 5 mins L4          Prone      Prone lying      Supine with legs on wedge 5 minutes  Pt reports relief               Glut sets 10x3 3 sec Supine 6/3   Hamstring curls   Limited ROM to avoid pain   Heel ball squeeze 10x3  Supine 6/3   JEFF   Pt notes onset of numbness localized to lower spine   multifidis                  Seated      LAQs 10x2     Marching 10x2     Alt arm raise      Lumbar flexion stretch 10x3  Red stability ball               supine      Abdominal isometric 10x2 5''    Alternating arms 10x2           quadraped   Not tolerated                     Standing      Wall slide    Attempted but not tolerated                                 Other: pt pressure feeling in low back increases with both traction and compression of spine   Traction pain runs from lower back to back knee    Lumbar ROM: Flex 30° (no change), Ext 10° (no change)    Treatment Charges: Mins Units   [x]  Modalities-HP 20 0   [x]  Ther Exercise 45 3   []  Manual Therapy     []  Ther Activities     []  Aquatics     []  Vasocompression     []  Other     Total Treatment time 45 3       Assessment: [x] Progressing toward goals. Pt was able to tolerate more reps of exercises today. Pt tolerated supine with wedge and reported pain relief. Pt did exercises in supine and tolerated better. Pt appears to do better with exercise when distracted by conversation. Pt continued HP at the end of tx for pain relief. [] No change. [] Other:  [x] Patient would continue to benefit from skilled physical therapy services in order to:  decrease LBP, Increase LE strength, tolerate standing, sitting and moving, return to normal duty at work. STG: (to be met in 9 treatments)  1. ? Pain: 6/10--NOT MET  2. ? ROM:35 lumbar flexion, 15 lumbar extension-NOT MET  3. ? Strength: 4 R hip flexion without pain, 4+ R knee extension without pain, 4+ R DF without pain, 4 PF without pain--NOT MET  4. ? Function: 60 %  functionally impaired according to Oswestry--NOT MET  5. Patient to be independent with home exercise program as demonstrated by performance with correct form without cues     Patient goals:\"to feel better\"    Pt. Education:  [x] Yes  [] No  [x] Reviewed Prior HEP/Ed  Method of Education: [x] Verbal  [x] Demo  [] Written  Comprehension of Education:  [x] Verbalizes understanding. [x] Demonstrates understanding. [x] Needs review. [] Demonstrates/verbalizes HEP/Ed previously given. Patient's HEP--change soon     Plan: [x] Continue current frequency toward long and short term goals.     [x] Specific Instructions for subsequent treatments:core strengthening exercise and attempt to increase motion in spine, attempt standing exercise if tolerated      Time In: 1400           Time Out: 1510    Electronically signed by:  Georgie Morillo  Patient treated and note written by Judah Briggs Pina Duran, Student PT under direct supervision of Javon Lemos PT.

## 2022-06-06 ENCOUNTER — HOSPITAL ENCOUNTER (OUTPATIENT)
Dept: PHYSICAL THERAPY | Age: 52
Setting detail: THERAPIES SERIES
Discharge: HOME | End: 2022-06-06
Payer: COMMERCIAL

## 2022-06-06 PROCEDURE — G0283 ELEC STIM OTHER THAN WOUND: HCPCS

## 2022-06-06 PROCEDURE — 97110 THERAPEUTIC EXERCISES: CPT

## 2022-06-06 NOTE — FLOWSHEET NOTE
[x] [facility]  [address]  [phone]  F: [phone]         Physical Therapy Daily Treatment Note    Date:  2022  Patient Name:  Marisol Arroyo    :  1970  MRN: [id number]  Physician: Mau Malik MD                                 Insurance: Worker's Comp, (Presumptive auth for 12 tx)  Medical Diagnosis:  LB muscle strain ICD-10: S39.012A                 Rehab Codes: R 53.82, R 26.89, M 54.59  Onset Date: 22                Next 's appt.: 22  Visit# / total visits:      Cancels/No Shows: 0/0      Subjective:    Pain:  [x] Yes  [] No Location: low back Pain Rating: (0-10 scale) 8/10  Pain altered Tx:  [] No  [x] Yes  Action: limited ex to pt's tolerance  Comments: Pt reports he's sore today, can tell the weather is going to change. Pt reports his wife noticed a puffy area on his R back, would like us to look at it. Pt reports he is to have MRI Thurs . Objective:  Modalities: hot pack (large) and UES (opiate) to low back in seated for 20 minutes after exercises.   Precautions:  Exercises:  Exercise Reps/ Time Weight/ Level Comments   Nustep 5 mins L4          Prone   Held /- attempted, unable    Prone lying            Glut sets  3 sec Supine    Hamstring curls   Limited ROM to avoid pain   Heel ball squeeze   Supine 6/3   JEFF   Pt notes onset of numbness localized to lower spine               Seated      LAQs 10x x Decreased reps /6   Marching 10x2 x    Alt arm raise 10x2 x    Lumbar flexion stretch 10x2 x Red stability ball;  Pt reports is tight today, decreased reps                supine   Pt unable to tolerate hooklying, better in supine    Glut sets 10x2 3sec Decreased reps /6   Abdominal isometric 10x2 3sec    Add sets  10x2  Ball b/t knees, tolerates better if does not hold, Decreased reps /6   Alternating arms 10x2     Supine with legs on wedge *  Resume next Rx               quadraped Not tolerated                     Standing      Wall slide    Attempted 6/1 but not tolerated                                 Other:     Lumbar ROM: 5/31  Flex 30° (no change), Ext 10° (no change)    Treatment Charges: Mins Units   [x]  Modalities-ES  HP 15  20 1  --   [x]  Ther Exercise 44 3   []  Manual Therapy     []  Ther Activities     []  Aquatics     []  Vasocompression     []  Other     Total Treatment time 59        Assessment: [] Progressing toward goals. [x] No change. Limited ex this date due to Pt increased pain. Pt performs all ex very slowly, rest breaks between each set of ex due to pain. Pt attempted to lay prone, increased pain with attempting, held prone ex performed supine ex instead. Cont HP, resumed ES at end of Rx to decrease pain. Pt reports ES feels different this time. Pt unsure if pain decreased w/HP ES, to monitor symptoms rest of day. [x] Other: Min edema noted in R LB, difficult to assess due to area and body habitus. Note muscle tightness B lumbar paraspinals, R greater than L. [x] Patient would continue to benefit from skilled physical therapy services in order to:  decrease LBP, Increase LE strength, tolerate standing, sitting and moving, return to normal duty at work. STG: (to be met in 9 treatments)  1. ? Pain: 6/10--NOT MET  2. ? ROM:35 lumbar flexion, 15 lumbar extension-NOT MET  3. ? Strength: 4 R hip flexion without pain, 4+ R knee extension without pain, 4+ R DF without pain, 4 PF without pain--NOT MET  4. ? Function: 60 %  functionally impaired according to Oswestry--NOT MET  5. Patient to be independent with home exercise program as demonstrated by performance with correct form without cues     Patient goals:\"to feel better\"    Pt. Education:  [x] Yes  [] No  [x] Reviewed Prior HEP/Ed  Method of Education: [x] Verbal  [x] Demo  [] Written  Comprehension of Education:  [x] Verbalizes understanding. [x] Demonstrates understanding. [x] Needs review.   [] Demonstrates/verbalizes HEP/Ed previously given. Patient's HEP--change soon     Plan: [x] Continue current frequency toward long and short term goals.     [x] Specific Instructions for subsequent treatments:core strengthening exercise and attempt to increase motion in spine, attempt standing exercise if tolerated      Time In: 1350           Time Out: 1506    Electronically signed by:  Kasey Saxena PT

## 2022-06-08 ENCOUNTER — HOSPITAL ENCOUNTER (OUTPATIENT)
Dept: PHYSICAL THERAPY | Age: 52
Setting detail: THERAPIES SERIES
Discharge: HOME | End: 2022-06-08
Payer: COMMERCIAL

## 2022-06-08 PROCEDURE — 97110 THERAPEUTIC EXERCISES: CPT

## 2022-06-08 NOTE — FLOWSHEET NOTE
[x] [facility]  [address]  [phone]  F: [phone]         Physical Therapy Daily Treatment Note    Date:  2022  Patient Name:  Farzad Sotelo    :  1970  MRN: [id number]  Physician: Sherry Banda MD                                 Insurance: Worker's Comp, (Presumptive auth for 12 tx)  Medical Diagnosis:  LB muscle strain ICD-10: S39.012A                 Rehab Codes: R 53.82, R 26.89, M 54.59  Onset Date: 22                Next 's appt.:  TBD  Visit# / total visits: 10/12     Cancels/No Shows: 0/0      Subjective:    Pain:  [x] Yes  [] No Location: low back Pain Rating: (0-10 scale) 8/10  Pain altered Tx:  [] No  [x] Yes  Action: trial STM/TPR, ended with HP. Comments:    Reports no changes with LB states LB goes numb at times. Continuing to to use str. cane. Has MRI scheduled 22    Objective:  Modalities: hot pack (large) and UES (opiate) to low back in seated for 20 minutes after exercises. -pt deferred ES, HP 15 mins    Precautions:  Exercises:  Exercise Reps/ Time Weight/ Level Comments   Nustep 5 mins L4 L3 6/8         Prone   Over one pillow    Manual  8 mins  STM lumbar paraspinals and along posterior  iliac crest    Prone lying 8 mins     Glut sets 10x 3 sec Supine 6/6   Hamstring curls 12x  Limited ROM to avoid pain   Heel ball squeeze 10x     JEFF 3x30\"      Press ups 10x  Added 6/8         Seated      LAQs 10x x     Marching 10x2 x    Alt arm raise 10x2 x    Lumbar flexion stretch 10x1 x  red ball flexion               supine       Hooklying LTR 5x 5\"  Added 6/8   Piriformis stretch 3x15\"  Manual assist. Added 6/8   Glut sets   3sec     Abdominal isometric 1x10 5sec Incr. Hold time.    iso abdom w/ march 1x15  Added 6/8   Add sets     Ball b/t knees, tolerates better if does not hold, Decreased reps 6/6   Alternating arms      Supine with legs on wedge                    quadraped   Not tolerated Standing      Wall slide    Attempted 6/1 but not tolerated                                 Other: pt deferred ES but reports HP is helpful. .    Lumbar ROM: 5/31  Flex 30° (no change), Ext 10° (no change)       Treatment Charges: Mins Units Time In/Out   [] Evaluation       []  Low       []  Moderate       []  High      []  Modalities        [x]  Ther Exercise 48 3 [id number]   []  Neuromuscular Re-ed      []  Gait Training      []  Manual Therapy      []  Ther Activities      []  Aquatics      []  Vasocompression      []  Cervical Traction      [x]  Other  CP 15 -- [id number]   Total Treatment time  48 min            Assessment: [x] Progressing toward goals Added STM to lumbar paraspinals and posterior iliac crest with one trigger point at right SI  noted, fair release-pt demonstrated fair tolerance. Able to progress extension based exercises, added core strengthening exercise, and piriformis stretching. [] No change. [] Other:   [x] Patient would continue to benefit from skilled physical therapy services in order to:  decrease LBP, Increase LE strength, tolerate standing, sitting and moving, return to normal duty at work. STG: (to be met in 9 treatments)  1. ? Pain: 6/10--NOT MET  2. ? ROM:35 lumbar flexion, 15 lumbar extension-NOT MET  3. ? Strength: 4 R hip flexion without pain, 4+ R knee extension without pain, 4+ R DF without pain, 4 PF without pain--NOT MET  4. ? Function: 60 %  functionally impaired according to Oswestry--NOT MET  5. Patient to be independent with home exercise program as demonstrated by performance with correct form without cues     Patient goals:\"to feel better\"    Pt. Education:  [x] Yes  [] No  [x] Reviewed Prior HEP/Ed  Method of Education: [x] Verbal  [x] Demo  [] Written  Comprehension of Education:  [x] Verbalizes understanding. [x] Demonstrates understanding. [x] Needs review. [] Demonstrates/verbalizes HEP/Ed previously given.     Patient's HEP--change soon     Plan: [x] Continue current frequency toward long and short term goals. [x] Specific Instructions for subsequent treatments: progress core strengthening exercise and Lumbar ROM.   Added standing gluteal and core strengtheing      Time In:  1250           Time Out:  1355  Electronically signed by:  Willia Brunner, PTA

## 2022-06-09 ENCOUNTER — HOSPITAL ENCOUNTER (OUTPATIENT)
Dept: MRI IMAGING | Facility: CLINIC | Age: 52
Discharge: HOME | End: 2022-06-11
Payer: COMMERCIAL

## 2022-06-09 DIAGNOSIS — R52 PAIN: ICD-10-CM

## 2022-06-09 PROCEDURE — 72148 MRI LUMBAR SPINE W/O DYE: CPT

## 2022-06-10 ENCOUNTER — HOSPITAL ENCOUNTER (OUTPATIENT)
Dept: PHYSICAL THERAPY | Age: 52
Setting detail: THERAPIES SERIES
Discharge: HOME | End: 2022-06-10
Payer: COMMERCIAL

## 2022-06-10 PROCEDURE — 97110 THERAPEUTIC EXERCISES: CPT

## 2022-06-10 NOTE — FLOWSHEET NOTE
[x] [facility]  [address]  [phone]  F: [phone]     Physical Therapy Daily Treatment Note    Date:  6/10/2022  Patient Name:  Yanna Bower    :  1970  MRN: [id number]  Physician: Eloise Diana MD                                 Insurance: Worker's Comp, (Presumptive auth for 12 tx)  Medical Diagnosis:  LB muscle strain ICD-10: S39.012A                 Rehab Codes: R 53.82, R 26.89, M 54.59  Onset Date: 22                Next 's appt.:  TBD  Visit# / total visits:      Cancels/No Shows: 0/0      Subjective:    Pain:  [x] Yes  [] No Location: low back  Pain Rating: (0-10 scale) 8/10  Pain altered Tx:  [x] No  [] Yes  Action: trial STM/TPR, ended with HP. Comments: Pt cont to ambulate with SPC into therapy session. Pt noted took pain meds around 10am which pt c/o tolerable pain rated at 8/10 but no radicular symptoms. Pt stated increased pain and muscle stiffness after completing last therapy session, stating \"I may have overdid it\". Pt noted had MRI yesterday. Pt noted will see Doctor next week for f/u. Impression   Multilevel degenerative change with a midline disc protrusion at L4-5   resulting in canal stenosis.       Mild left foraminal narrowing at L4-5.       RECOMMENDATIONS:   Unavailable                   Objective:  Modalities: hot pack (large) and UES (opiate) to low back in seated for 20 minutes after exercises. -pt deferred ES, HP 15 mins  6/8  Precautions:  Exercises: Bold = Completed today   Exercise Reps/ Time Weight/ Level Comments   Nustep 5 mins L4 6/10-NuStep 170#         Prone   Over one pillow    Manual  8 mins  STM lumbar paraspinals and along posterior  iliac crest    Prone lying 8 mins     Glut sets 10x 3 sec Supine 6/6   Hamstring curls 12x  Limited ROM to avoid pain   Heel ball squeeze 10x     JEFF 3x30\"      Press ups 10x  Added 6/8         Seated      LAQs 10x 2# 6/10-added wt Marching 10x2 2#  6/10-added wt   Alt arm raise 10x2     Lumbar flexion stretch 10x1  Red ball flexion   Heel raises 2x10 2# Added 6/10               Supine        Hooklying LTR 5x 5\"  Added 6/8   Piriformis stretch 3x15\"  Manual assist. Added 6/8   Glut sets   3sec     Abdominal isometric 1x10 5sec Incr. Hold time. iso abdom w/ march 1x15  Added 6/8   Add sets     Ball b/t knees, tolerates better if does not hold, Decreased reps 6/6   Alternating arms      Supine with legs on wedge                    Quadruped   Not tolerated         Standing      Wall slide    Attempted 6/1 but not tolerated   B heel tap  2x10 ea   8x ea  6in Added 6/10-BUE support on //bar    Step up fwd 6x ea  4in Added 6/10-inc dizziness/lightheaded                     Other: pt deferred ES but reports HP is helpful. .    Lumbar ROM: 5/31  Flex 30° (no change), Ext 10° (no change)       Treatment Charges: Mins Units Time In/Out   [] Evaluation       []  Low       []  Moderate       []  High      []  Modalities        [x]  Ther Exercise 43 3 1:00pm-1:48pm   []  Neuromuscular Re-ed      []  Gait Training      []  Manual Therapy      []  Ther Activities      []  Aquatics      []  Vasocompression      []  Cervical Traction      [x]  Other - HP 10 -- 1:50pm-2:00pm   Total Treatment time 43 3 1:00pm-2:00pm          Assessment: [] Progressing toward goals       [] No change. [x] Other: Initiated therapy session on NuStep for total of 5 mins to promote tissue extensibility and blood flow to BLE. Completed all exs in seated and standing in attempt to improve overall tolerance in upright posture as pt reported increased SOB when wearing a mask partly due to being claustrophobic. Pt required short seated rest break in between exs/reps/sets to manage SOB and fatigue. Attempted to step up forward however hold after 6 reps d/t c/o increased lightheaded/dizziness thus required to sit down which symptoms resolved shortly afterward.   10 mins HP applied to low back in seated to avoid orthostatic hypotension with changing positions. [x] Patient would continue to benefit from skilled physical therapy services in order to:  decrease LBP, Increase LE strength, tolerate standing, sitting and moving, return to normal duty at work. STG: (to be met in 9 treatments)  ? Pain: 6/10--NOT MET  ? ROM:35 lumbar flexion, 15 lumbar extension-NOT MET  ? Strength: 4 R hip flexion without pain, 4+ R knee extension without pain, 4+ R DF without pain, 4 PF without pain--NOT MET  ? Function: 60 %  functionally impaired according to Oswestry--NOT MET  Patient to be independent with home exercise program as demonstrated by performance with correct form without cues     Patient goals:\"to feel better\"    Pt. Education:  [x] Yes  [] No  [x] Reviewed Prior HEP/Ed  Method of Education: [x] Verbal  [x] Demo  [] Written  Comprehension of Education:  [x] Verbalizes understanding. [x] Demonstrates understanding. [x] Needs review. [] Demonstrates/verbalizes HEP/Ed previously given. Patient's HEP--change soon     Plan: [x] Continue current frequency toward long and short term goals. [x] Specific Instructions for subsequent treatments: progress core strengthening exercise and Lumbar ROM. Added standing gluteal and core strengtheing      Time In: 1300           Time Out:  1400  Electronically signed by:   Emily Serna, PT

## 2022-06-15 ENCOUNTER — HOSPITAL ENCOUNTER (OUTPATIENT)
Dept: PHYSICAL THERAPY | Age: 52
Setting detail: THERAPIES SERIES
Discharge: HOME | End: 2022-06-15
Payer: COMMERCIAL

## 2022-06-15 PROCEDURE — 97110 THERAPEUTIC EXERCISES: CPT

## 2022-06-15 NOTE — PROGRESS NOTES
[x] [facility]) [facility] &  Therapy  [address]  [phone]  F: [phone]         Physical Therapy Progress Note    Date: 6/15/2022      Patient: Laura Yeh  : 1970  MRN: [id number]    Chioma Beltran MD                                 Insurance: Worker's Comp, (Presumptive auth for 9-12 tx)  Medical Diagnosis:  LB muscle strain ICD-10: S39.012A                 Rehab Codes: R 53.82, R 26.89, M 54.59  Onset Date: 22                Next 's appt.: 6-15-22  Total visits attended: 15  Cancels/No shows: 0/0  Date range of services: 22 to 6/15/2022      Subjective:  Pain:  [x]? Yes  []? No   Location: low back      Pain Rating: (0-10 scale) 7/10  Pain altered Tx:  []? No  [x]? Yes  Action: to the pt's tolerance  Comments: Pt cont w/pain. Pain at worst 8-8.5/10. Objective:  Test Measurements:    6/15 ROM DEGREES A/P STRENGTH      RIGHT RIGHT   HIP FLEX   3+p   HIPEXT       HIP ABD               KNEE FLEX       KNEE EXT   3+p           ANKLE DF   4-p           Lumbar Flex 22°p         Ext 10°p           Function: Oswestry 58% loss of function    Assessment:  STG:(to be met in 9 treatments)  1. ? Pain: 6/10--Min Change  2. ? ROM:35 lumbar flexion, 15 lumbar extension-No Change   3. ? Strength: 4 R hip flexion without pain, 4+ R knee extension without pain, 4+ R DF without pain, 4 PF without pain--No Change   4. ? Function: 60 %  functionally impaired according to Oswestry--Met  5.  Patient to be independent with home exercise program as demonstrated by performance with correct form without cues-Progress Toward        Treatment Plan:  [x] Therapeutic Exercise   24383  [] Iontophoresis: 4 mg/mL Dexamethasone Sodium Phosphate  mAmin  27089   [] Therapeutic Activity  41704 [] Vasopneumatic cold with compression  29094    [] Gait Training   51186 [] Ultrasound   15068   [] Neuromuscular Re-education  74791 [x] Electrical Stimulation Unattended  77709   [x] Manual Therapy  19867 [] Electrical Stimulation Attended  [id number]   [x] Instruction in HEP  [] Lumbar/Cervical Traction  [id number]   [] Aquatic Therapy   [id number] [x] Cold/hotpack    [] Massage   [id number]      [] Dry Needling, 1 or 2 muscles  27770   [] Biofeedback, first 15 minutes   22253  [] Biofeedback, additional 15 minutes   73622 [] Dry Needling, 3 or more muscles  83652       Patient Status:     [] Continue per initial plan of care    [] Additional visits necessary. [x] Other: Pt at end of [address]. Pt w/minimal improvement w/PT, recommend further evaluation by physician. Electronically signed by Nathanael Vásquez PT on 6/15/2022 at 8:28 AM        If you have any questions or concerns, please don't hesitate to call. Thank you for your referral.    Physician Signature:________________________________Date:__________________  By signing above or cosigning this note, I have reviewed this plan of care and certify a need for medically necessary rehabilitation services.      *PLEASE SIGN ABOVE AND FAX BACK ALL PAGES*

## 2022-06-15 NOTE — FLOWSHEET NOTE
[x] [facility]) [facility] &  Therapy  [address]  [phone]  F: [phone]     Physical Therapy Daily Treatment Note    Date:  6/15/2022  Patient Name:  Yanna Bower    :  1970  MRN: [id number]  Physician: Eloise Diana MD                                 Insurance: Worker's Comp, (Auth for 12 tx)  Medical Diagnosis:  LB muscle strain ICD-10: S39.012A                 Rehab Codes: R 53.82, R 26.89, M 54.59  Onset Date: 22                Next 's appt.:  TBD  Visit# / total visits:      Cancels/No Shows: 0/0      Subjective:    Pain:  [x] Yes  [] No Location: low back  Pain Rating: (0-10 scale) 7/10  Pain altered Tx:  [x] No  [] Yes  Action: limited ex   Comments: Pt reports cont w/pain. Pt reports he is to see   today, states they found something on his MRI. Pain at worst 8-8.5/10. Pt reports doing more heat packs, more ice packs and relaxing to help pain. One time when walking both legs felt jittery, like going to collapse, improved w/seated rest break, heat & tylenol. From  MRI report    Impression   Multilevel degenerative change with a midline disc protrusion at L4-5   resulting in canal stenosis.       Mild left foraminal narrowing at L4-5. Objective:  Modalities: hot pack (large) and UES (opiate) to low back in seated for 20 minutes after exercises. -pt deferred ES  Precautions:  Exercises:   Exercise Reps/ Time Weight/ Level Comments   Nustep 5 mins L4          Prone   Over one pillow    Manual  --- mins  STM lumbar paraspinals and along posterior  iliac crest -held 6/15 due to recheck    Prone lying 4 mins     Glut sets 10x 5 sec    Hamstring curls 12x  Limited ROM to avoid pain   Heel ball squeeze 10x     JEFF 3x30\"      Press ups            Seated      LAQs 10x 2#    Marching 10x2 2#     Alt arm raise 10x2     Lumbar flexion stretch 10x1  Red ball flexion   Heel raises 2x10 2#                Supine        Hooklying LTR   6/15 Unable to flex R knee enough to do    Piriformis stretch   Manual assist, 6/15 Unable to flex R knee enough to do    Glut sets   5sec     Abdominal isometric 10x2 5sec Incr. Hold time. iso abdom w/ march   6/15 Unable to flex R knee enough to do    Add sets   10x2  Ball b/t knees, tolerates better if does not hold   Alternating arms      Supine with legs on wedge                    Quadruped   Not tolerated         Standing      Wall slide    Attempted 6/1 but not tolerated   B heel tap   6in Added 6/10-BUE support on //bar -held 6/15 due to dizzyness   Step up fwd  4in Held due to prior dizziness/lightheaded   Heel raises  10x  Added 6/15   HS curls  10x L  5x R  Added 6/15, dizzy after 5 reps on R         Other:         6/15 ROM DEGREES A/P STRENGTH     RIGHT RIGHT   HIP FLEX  3+p   HIPEXT     HIP ABD          KNEE FLEX     KNEE EXT  3+p        ANKLE DF  4-p        Lumbar Flex 22°p        Ext 10°p           Treatment Charges: Mins Units Time In/Out   []  Modalities        [x]  Ther Exercise 57 4 4747-3033   []  Neuromuscular Re-ed      []  Gait Training      []  Manual Therapy      []  Ther Activities      []  Aquatics      []  Vasocompression      []  Cervical Traction      [x]  Other - HP 15 -- 3142-1485   Total Treatment time 57 4           Assessment: [x] Progressing toward goals. Oswestry 58% loss of function. Initiated standing HR and HS curls to improve tolerance to standing activities. [x] No change-Pt w/dizziness w/HS curls, limited reps R LE due to pain. Pt reports keeping his eyes closed helps w/dizziness. Held supine LTE, march and piriformis stretches as Pt unable to tolerate flexing R hip and knee greater than 45°. [x] Other:  Cont HP at end of Rx to decrease pain and tightness.     [x] Patient would continue to benefit from skilled physical therapy services in order to:  decrease LBP, Increase LE strength, tolerate standing, sitting and moving, return to normal duty at

## 2022-06-27 ENCOUNTER — HOSPITAL ENCOUNTER (OUTPATIENT)
Dept: PHYSICAL THERAPY | Age: 52
Setting detail: THERAPIES SERIES
Discharge: HOME | End: 2022-06-27
Payer: COMMERCIAL

## 2022-06-27 PROCEDURE — 97110 THERAPEUTIC EXERCISES: CPT

## 2022-06-27 NOTE — FLOWSHEET NOTE
[x] [facility]) [facility] &  Therapy  [address]  [phone]  F: [phone]     Physical Therapy Daily Treatment Note    Date:  2022  Patient Name:  Micaela Wolf    :  1970  MRN: [id number]  Physician: Brennan Raymond MD                                 Insurance: Worker's Comp, (Auth for 20 total tx)  Medical Diagnosis:  LB muscle strain ICD-10: S39.012A                 Rehab Codes: R 53.82, R 26.89, M 54.59  Onset Date: 22                Next 's appt. :   or  (pt to check); back surgeon late July  Visit# / total visits:      Cancels/No Shows: 0/0      Subjective:    Pain:  [x] Yes  [] No Location: low back  Pain Rating: (0-10 scale) 7/10  Pain altered Tx:  [x] No  [] Yes  Action:   Comments: Pt received WC auth to cont PT, resuming PT after 1 week off. Pt reports he is to see [facility] doctor later this week and is to see back surgeon end of July. Pt reports got a little dizzy and fell Fri, onto his back, \"had a bad weekend,\" w/increased pain since that time. Pt had been doing his exercises immediately prior to that dizziness. Taking pain meds helps. From  MRI report    Impression   Multilevel degenerative change with a midline disc protrusion at L4-5   resulting in canal stenosis.       Mild left foraminal narrowing at L4-5. Objective:  Modalities: hot pack (large) and UES (opiate) to low back in seated for 20 minutes after exercises. -pt deferred ES  Precautions:  Exercises:   Exercise Reps/ Time Weight/ Level Comments   Nustep 5 mins L4          Prone   Over one pillow    Manual  --- mins  STM lumbar paraspinals and along posterior  iliac crest -held 6/15 due to recheck    Prone lying      Glut sets  5 sec    Hamstring curls   Limited ROM to avoid pain   Heel ball squeeze      JEFF       Press ups            Seated      LAQs 10x 2#    Marching 10x2 2#     Alt arm raise 10x2     Lumbar flexion stretch 10x2  Red ball flexion, progressed reps 6/27   Heel raises 2x10 2#                Supine        Hooklying LTR   6/15 Unable to flex R knee enough to do    Piriformis stretch   Manual assist, 6/15 Unable to flex R knee enough to do    Glut sets  10x 5sec    Abdominal isometric 10x2 5sec    iso abdom w/ march   6/15 Unable to flex R knee enough to do    Add sets   10x2  Ball b/t knees, tolerates better if does not hold   Alternating arms      Supine with legs on wedge                    Quadruped   Not tolerated         Standing      Wall slide    Attempted 6/1 but not tolerated   B heel tap   6in Added 6/10-BUE support on //bar -held 6/15 due to dizzyness   Step up fwd  4in Held due to prior dizziness/lightheaded   Heel raises  10x2  progressed reps 6/27   HS curls  10x   Partly limited ROM R to improve tolerance         Other:         6/15 ROM DEGREES A/P STRENGTH     RIGHT RIGHT   HIP FLEX  3+p   HIPEXT     HIP ABD          KNEE FLEX     KNEE EXT  3+p        ANKLE DF  4-p        Lumbar Flex 22°p        Ext 10°p           Treatment Charges: Mins Units Time In/Out   []  Modalities        [x]  Ther Exercise 49 3 3635-0091   []  Neuromuscular Re-ed      []  Gait Training      []  Manual Therapy      []  Ther Activities      []  Aquatics      []  Vasocompression      []  Cervical Traction      [x]  Other - HP 15 -- 8103-4000   Total Treatment time 49 3           Assessment: [x] Progressing toward goals. Pt able to resume prior ex program, able to perform increased reps heel raises and lumbar flex stretch. [x] No change-Pt w/greatest difficulty w/standing HS curls on R. Pt requests hold prone ex due to increased symptoms and having a bad weekend. [x] Other:  Cont HP at end of Rx to decrease pain and tightness. [x] Patient would continue to benefit from skilled physical therapy services in order to:  decrease LBP, Increase LE strength, tolerate standing, sitting and moving, return to normal duty at work.     STG: (to be met in 12 treatments)  ? Pain: 6/10--Min Change  ? ROM:35 lumbar flexion, 15 lumbar extension-No Change   ? Strength: 4 R hip flexion without pain, 4+ R knee extension without pain, 4+ R DF without pain, 4 PF without pain--No Change   ? Function: 60 %  functionally impaired according to Oswestry--Met  Patient to be independent with home exercise program as demonstrated by performance with correct form without cues-Progress Toward  LTG/New Goal (to be met in 20 Rx, set 6/27)   ? Function: Oswestry 48%  functionally impaired     Patient goals:\"to feel better\"    Pt. Education:  [x] Yes  [] No  [x] Reviewed Prior HEP/Ed  Method of Education: [x] Verbal  [x] Demo  [] Written  Comprehension of Education:  [x] Verbalizes understanding. [x] Demonstrates understanding. [x] Needs review. [] Demonstrates/verbalizes HEP/Ed previously given. Plan: [x] Continue current frequency toward long and short term goals.     [x] Specific Instructions for subsequent treatments: progress ex per Pt tolerance, issue written HEP          Time In: 0979           Time Out: 7800    Electronically signed by:  Luli Bailey, PT

## 2022-06-29 ENCOUNTER — HOSPITAL ENCOUNTER (OUTPATIENT)
Dept: PHYSICAL THERAPY | Age: 52
Setting detail: THERAPIES SERIES
Discharge: HOME | End: 2022-06-29
Payer: COMMERCIAL

## 2022-06-29 PROCEDURE — 97110 THERAPEUTIC EXERCISES: CPT

## 2022-06-29 NOTE — FLOWSHEET NOTE
[x] [facility]) [facility] &  Therapy  [address]  [phone]  F: [phone]     Physical Therapy Daily Treatment Note    Date:  2022  Patient Name:  Zoe Wagner    :  1970  MRN: [id number]  Physician: Александр Vargas MD                                 Insurance: Worker's Comp, (Auth for 20 total tx)  Medical Diagnosis:  LB muscle strain ICD-10: S39.012A                 Rehab Codes: R 53.82, R 26.89, M 54.59  Onset Date: 22                Next 's appt. :   or  (pt to check); back surgeon late July  Visit# / total visits:      Cancels/No Shows: 0/0      Subjective:    Pain:  [x] Yes  [] No Location: low back  Pain Rating: (0-10 scale) 8/10  Pain altered Tx:  [x] No  [] Yes  Action:   Comments: States he has done a lot of walking today and he had an [facility] appointment this morning, then came back for PT. Pain 8/10 in low back. Objective:  Modalities: hot pack (large) to low back in supine for 15 minutes after exercises. -pt deferred ES  Precautions:  Exercises:   Exercise Reps/ Time Weight/ Level Comments   Nustep 5 mins L4          Prone   Over one pillow    Manual  --- mins  STM lumbar paraspinals and along posterior  iliac crest -held 6/15 due to recheck    Prone lying      Glut sets      Hamstring curls   Limited ROM to avoid pain   Heel ball squeeze      JEFF       Press ups            Seated      LAQs 10x  Held weight   Marching  10x2  Held weight   Alt arm raise  10x2     Lumbar flexion stretch 10x2  Red ball flexion, progressed reps    Heel raises   2x10  Held weight               Supine        Hooklying LTR   6/15 Unable to flex R knee enough to do    Piriformis stretch   Manual assist, 6/15 Unable to flex R knee enough to do    Glut sets  10x 5sec    Abdominal isometric  10x2 5sec    iso abdom / march   6/15 Unable to flex R knee enough to do    Add sets    Dash [place] b/t knees, tolerates better if does not hold Alternating arms 10x     Supine with legs on wedge                    Quadruped   Not tolerated         Standing      Wall slide    Attempted 6/1 but not tolerated   B heel tap   6in Added 6/10-BUE support on //bar -held 6/15 due to dizzyness   Step up fwd  4in Held due to prior dizziness/lightheaded   Heel raises    progressed reps 6/27   HS curls    Partly limited ROM R to improve tolerance         Other:       Treatment Charges: Mins Units Time In/Out   []  Modalities        [x]  Ther Exercise 28 2 2774-6908   []  Neuromuscular Re-ed      []  Gait Training      []  Manual Therapy      []  Ther Activities      []  Aquatics      []  Vasocompression      []  Cervical Traction      [x]  Other -  15 0 8974-2833   Total Treatment time             Assessment: [x] Progressing toward goals. Completed exercises per tolerance this date, held standing exercises secondary to pain. Able to complete seated and supine exercises as tolerated. Continued hot pack to low back supine to decrease pain. [x] No change in pain level. Patient is limited with exercises secondary to pain complaints. [] Other:     [x] Patient would continue to benefit from skilled physical therapy services in order to:  decrease LBP, Increase LE strength, tolerate standing, sitting and moving, return to normal duty at work. STG: (to be met in 12 treatments)  1. ? Pain: 6/10--Min Change  2. ? ROM:35 lumbar flexion, 15 lumbar extension-No Change   3. ? Strength: 4 R hip flexion without pain, 4+ R knee extension without pain, 4+ R DF without pain, 4 PF without pain--No Change   4. ? Function: 60 %  functionally impaired according to Oswestry--Met  5. Patient to be independent with home exercise program as demonstrated by performance with correct form without cues-Progress Toward  LTG/New Goal (to be met in 20 Rx, set 6/27)   6. ? Function: Oswestry 48%  functionally impaired     Patient goals:\"to feel better\"    Pt.  Education:  [x] Yes  [] No [x] Reviewed Prior HEP/Ed  Method of Education: [x] Verbal  [x] Demo  [] Written  Comprehension of Education:  [x] Verbalizes understanding. [x] Demonstrates understanding. [x] Needs review. [] Demonstrates/verbalizes HEP/Ed previously given. Plan: [x] Continue current frequency toward long and short term goals.     [x] Specific Instructions for subsequent treatments: progress ex per Pt tolerance, issue written HEP          Time In: 1500           Time Out: 5252    Electronically signed by:  Morelia Goodwin PTA

## 2022-07-05 ENCOUNTER — HOSPITAL ENCOUNTER (OUTPATIENT)
Dept: PHYSICAL THERAPY | Age: 52
Setting detail: THERAPIES SERIES
Discharge: HOME | End: 2022-07-05
Payer: COMMERCIAL

## 2022-07-05 PROCEDURE — 97110 THERAPEUTIC EXERCISES: CPT

## 2022-07-05 NOTE — FLOWSHEET NOTE
[x] [facility]) [facility] &  Therapy  [address]  [phone]  F: [phone]     Physical Therapy Daily Treatment Note    Date:  2022  Patient Name:  Fara Moncada    :  1970  MRN: [id number]  Physician: Zan Robbins MD                                 Insurance: Worker's Comp, (Auth for 20 total tx)  Medical Diagnosis:  LB muscle strain ICD-10: S39.012A                 Rehab Codes: R 53.82, R 26.89, M 54.59  Onset Date: 22                Next 's appt. :   or  (pt to check); back surgeon late July  Visit# / total visits: 15/20     Cancels/No Shows: 0/0      Subjective:    Pain:  [x] Yes  [] No Location: low back  Pain Rating: (0-10 scale) 6/10  Pain altered Tx:  [x] No  [] Yes  Action:   Comments: Pt reports he's trying not to be stressed. Had to walk farther into appt, which aggravated him, but did take his pain meds prior to Rx. Yesterday pain was at 8.5 as he tried to walk more. Pt reports he notices that when he walks more he gets more headaches and lightheadedness, reports he is to have stress test later this month. Objective:  Modalities: hot pack (large) to low back in supine for 15 minutes after exercises. -pt deferred ES  Precautions:  Exercises:   Exercise Reps/ Time Weight/ Level Comments   Nustep 5 mins L4          Prone   Over one pillow    Manual  --- mins  STM lumbar paraspinals and along posterior  iliac crest    Prone lying      Glut sets      Hamstring curls   Limited ROM to avoid pain   Heel ball squeeze      JEFF       Press ups            Seated      LAQs 10x  Held weight   Marching  10x2  Held weight   Alt arm raise  10x2     Lumbar flexion stretch 10x2  Red ball flexion   Heel raises   2x10  Held weight               Supine        Hooklying LTR --  6/15 Unable to flex R knee enough to do    Piriformis stretch --  615 Unable to flex R knee enough to do    Glut sets  10x2 5sec    Abdominal isometric  10x2 5sec iso abdom w/ march --  6/15 Unable to flex R knee enough to do    Add sets    10x2  Ball b/t knees, tolerates better if does not hold   Alternating arms 10x2     Supine with legs on wedge                    Quadruped   Not tolerated         Standing      Wall slide    Attempted 6/1 but not tolerated   B heel tap   6in Added 6/10-BUE support on //bar -held 6/15 due to dizzyness   Step up fwd  4in Held due to prior dizziness/lightheaded   Heel raises  10x2     HS curls  10x  Partly limited ROM R to improve tolerance         Other:       Treatment Charges: Mins Units Time In/Out   []  Modalities        [x]  Ther Exercise 42 3 9838-9451   []  Neuromuscular Re-ed      []  Gait Training      []  Manual Therapy      []  Ther Activities      []  Aquatics      []  Vasocompression      []  Cervical Traction      [x]  Other - HP 15 -- 6815-6510   Total Treatment time 42 3           Assessment: [x] Progressing toward goals. Pt able to resume limited standing ex, 1 rest break due to dizziness. Cont HP at end of Rx to decrease pain. [x] No change in pain level. Patient cont to be limited with exercises due to pain. [x] Other:  Instructed Pt dizziness and lightheadedness could be response to pain, or could be caused by heart problems, stress test will help to differentiate cause. [x] Patient would continue to benefit from skilled physical therapy services in order to:  decrease LBP, Increase LE strength, tolerate standing, sitting and moving, return to normal duty at work. STG: (to be met in 12 treatments)  1. ? Pain: 6/10--Min Change  2. ? ROM:35 lumbar flexion, 15 lumbar extension-No Change   3. ? Strength: 4 R hip flexion without pain, 4+ R knee extension without pain, 4+ R DF without pain, 4 PF without pain--No Change   4. ? Function: 60 %  functionally impaired according to Oswestry--Met  5.  Patient to be independent with home exercise program as demonstrated by performance with correct form without cues-Progress Toward  LTG/New Goal (to be met in 20 Rx, set 6/27)   6. ? Function: Oswestry 48%  functionally impaired     Patient goals:\"to feel better\"    Pt. Education:  [x] Yes  [] No  [x] Reviewed Prior HEP/Ed  Method of Education: [x] Verbal  [x] Demo  [] Written  Comprehension of Education:  [x] Verbalizes understanding. [x] Demonstrates understanding. [x] Needs review. [] Demonstrates/verbalizes HEP/Ed previously given. Plan: [x] Continue current frequency toward long and short term goals.     [x] Specific Instructions for subsequent treatments: progress ex per Pt tolerance, issue written HEP          Time In: 1400           Time Out: 1506    Electronically signed by:  Rizwana Nicholas PT

## 2022-07-08 ENCOUNTER — HOSPITAL ENCOUNTER (OUTPATIENT)
Dept: PHYSICAL THERAPY | Age: 52
Setting detail: THERAPIES SERIES
Discharge: HOME | End: 2022-07-08
Payer: COMMERCIAL

## 2022-07-08 PROCEDURE — 97110 THERAPEUTIC EXERCISES: CPT

## 2022-07-08 NOTE — FLOWSHEET NOTE
[x] [facility]) - [facility] &  Therapy  [address]  [phone]  F: [phone]     Physical Therapy Daily Treatment Note    Date:  2022  Patient Name:  Brady Ba    :  1970  MRN: [id number]  Physician: Maryellen Vazquez MD                                 Insurance: Worker's Comp, (Auth for 20 total tx)  Medical Diagnosis:  LB muscle strain ICD-10: S39.012A                 Rehab Codes: R 53.82, R 26.89, M 54.59  Onset Date: 22                Next 's appt. :   or  (pt to check); back surgeon late July  Visit# / total visits:      Cancels/No Shows: 0/0      Subjective:    Pain:  [x] Yes  [] No Location: low back  Pain Rating: (0-10 scale) 7/10  Pain altered Tx:  [x] No  [] Yes  Action:   Comments: Pt reports he's having a bad day, is having increased pain. Also has a lot of stress from home situation. Objective:  Modalities: hot pack (large) to low back in supine for 15 minutes after exercises. -pt deferred ES  Precautions:  Exercises:   Exercise Reps/ Time Weight/ Level Comments   Nustep 5 mins L4          Prone   Over one pillow    Manual  --- mins  STM lumbar paraspinals and along posterior  iliac crest    Prone lying      Glut sets      Hamstring curls   Limited ROM to avoid pain   Heel ball squeeze      JEFF       Press ups            Seated      LAQs 10x  Held weight   Marching  10x2  Held weight   Alt arm raise  10x2     Lumbar flexion stretch 10x2  Red ball flexion   Heel raises   2x10  Held weight               Supine        Hooklying LTR --  Unable to flex R knee enough to do    Piriformis stretch --  Unable to flex R knee enough to do    Glut sets  10x2 5sec    Abdominal isometric  10x2 5sec    iso abdom / march --  6/15 Unable to flex R knee enough to do    Add sets    Dash [place] b/t knees, tolerates better if does not hold   Alternating arms 10x2     Supine with legs on wedge                    Quadruped   Not tolerated Standing      Wall slide    Attempted 6/1 but not tolerated   B heel tap   6in Added 6/10-BUE support on //bar -held 6/15 due to dizzyness   Step up fwd  4in Held due to prior dizziness/lightheaded   Heel raises  10x2     HS curls  10x  Partly limited ROM R to improve tolerance         Other:       Treatment Charges: Mins Units Time In/Out   []  Modalities        [x]  Ther Exercise 39 3 7420-6436   []  Neuromuscular Re-ed      []  Gait Training      []  Manual Therapy      []  Ther Activities      []  Aquatics      []  Vasocompression      []  Cervical Traction      [x]  Other - HP 15 -- 6426-2533   Total Treatment time 39 3           Assessment: [x] Progressing toward goals. Pt able to perform all ex this date, reports increased pain at end of ex. Pt w/dizziness after sit-sup and sup-sit. Educated Pt in seated Heel raises & LAQ and supine isometrics and UE raises for HEP, issued handout. Cont HP at end of Rx to decrease pain. [x] No change in pain level. Patient cont to be limited with R standing HS curl due to pain, performs very slowly w/decreased ROM. [] Other:    [x] Patient would continue to benefit from skilled physical therapy services in order to:  decrease LBP, Increase LE strength, tolerate standing, sitting and moving, return to normal duty at work. STG: (to be met in 12 treatments)  1. ? Pain: 6/10--Min Change  2. ? ROM:35 lumbar flexion, 15 lumbar extension-No Change   3. ? Strength: 4 R hip flexion without pain, 4+ R knee extension without pain, 4+ R DF without pain, 4 PF without pain--No Change   4. ? Function: 60 %  functionally impaired according to Oswestry--Met  5. Patient to be independent with home exercise program as demonstrated by performance with correct form without cues-Progress Toward  LTG/New Goal (to be met in 20 Rx, set 6/27)   6. ? Function: Oswestry 48%  functionally impaired     Patient goals:\"to feel better\"    Pt.  Education:  [x] Yes  [] No  [] Reviewed Prior HEP/Ed  Method of Education: [x] Verbal  [x] Demo  [x] Written  Comprehension of Education:  [x] Verbalizes understanding. [] Demonstrates understanding. [] Needs review. [] Demonstrates/verbalizes HEP/Ed previously given. 7/8 HEP: seated LAQ, heel raises; supine janet (glut sets, abdom janet, add sets) and alt UE raises    Plan: [x] Continue current frequency toward long and short term goals.     [x] Specific Instructions for subsequent treatments: progress ex per Pt tolerance, progress HEP soon          Time In: 5560          Time Out: 7814    Electronically signed by:  Vance Hooper PT

## 2022-07-12 ENCOUNTER — HOSPITAL ENCOUNTER (OUTPATIENT)
Dept: PHYSICAL THERAPY | Age: 52
Setting detail: THERAPIES SERIES
Discharge: HOME | End: 2022-07-12
Payer: COMMERCIAL

## 2022-07-12 PROCEDURE — 97110 THERAPEUTIC EXERCISES: CPT

## 2022-07-12 NOTE — FLOWSHEET NOTE
[x] [facility] &  Therapy  [address]  [phone]  F: [phone]     Physical Therapy Daily Treatment Note    Date:  2022  Patient Name:  Ridge Perez    :  1970  MRN: [id number]  Physician: Melissa Lorenzana MD                                 Insurance: Worker's Comp, (Auth for 20 total tx)  Medical Diagnosis:  LB muscle strain ICD-10: S39.012A                 Rehab Codes: R 53.82, R 26.89, M 54.59  Onset Date: 22                Next 's appt. :   or  (pt to check); back surgeon late July  Visit# / total visits:      Cancels/No Shows: 0/0      Subjective:    Pain:  [x] Yes  [] No Location: low back  Pain Rating: (0-10 scale) 7/10  Pain altered Tx:  [x] No  [x] Yes  Action:   Comments: Pt reports he's a little sore, had to park further away so longer walk into appt. Pt reports he is to have stress test later this week, he is worried about it. Objective:  Modalities: hot pack (large) to low back in supine for 15 minutes after exercises. -pt deferred ES  Precautions:  Exercises:   Exercise Reps/ Time Weight/ Level Comments Completed    Nustep 5 mins L4  x          Prone   Over one pillow     Manual  --- mins  STM lumbar paraspinals and along posterior  iliac crest     Prone lying       Glut sets       Hamstring curls   Limited ROM to avoid pain    Heel ball squeeze       JEFF        Press ups              Seated       LAQs 10x  Held weight --   Marching  10x2  Held weight x   Alt arm raise  10x2   x   Lumbar flexion stretch 10x2  Red ball flexion x   Heel raises   2x10  Held weight x                 Supine         Hooklying LTR --  Unable to flex R knee enough to do     Piriformis stretch --  Unable to flex R knee enough to do     Glut sets  10x  8x 5sec Pain shooting R foot, post ankle up to post thigh 2 reps first set, limited reps 2nd set due to increasing frequency of x   Abdominal isometric  10x2 5sec  x   iso abdom w/ march --  6/15 Unable to flex R knee enough to do     Add sets    10x2  Ball b/t knees, tolerates better if does not hold x   Alternating arms 10x2   x   Supine with legs on wedge                       Quadruped   Not tolerated           Standing       Wall slide    Attempted 6/1 but not tolerated    B heel tap   6in Added 6/10-BUE support on //bar -held 6/15 due to dizzyness    Step up fwd  4in Held due to prior dizziness/lightheaded    Heel raises  10x2   x   HS curls  10x  Partly limited ROM R to improve tolerance x          Other:       Treatment Charges: Mins Units Time In/Out   []  Modalities        [x]  Ther Exercise 40 3 7074-8004   []  Neuromuscular Re-ed      []  Gait Training      []  Manual Therapy      []  Ther Activities      []  Aquatics      []  Vasocompression      []  Cervical Traction      [x]  Other -  15 -- 2779-2983   Total Treatment time 40            Assessment: [x] Progressing toward goals. Cont HP at end of Rx to decrease pain. [x] No change in pain level. Attempted increased time [employer], Pt unable to tolerate. Patient cont to be limited with glut sets and R standing HS curl due to pain, performs very slowly w/short rest breaks for both, and w/decreased ROM HS curls. [] Other:    [x] Patient would continue to benefit from skilled physical therapy services in order to:  decrease LBP, Increase LE strength, tolerate standing, sitting and moving, return to normal duty at work. STG: (to be met in 12 treatments)  1. ? Pain: 6/10--Min Change  2. ? ROM:35 lumbar flexion, 15 lumbar extension-No Change   3. ? Strength: 4 R hip flexion without pain, 4+ R knee extension without pain, 4+ R DF without pain, 4 PF without pain--No Change   4. ? Function: 60 %  functionally impaired according to Oswestry--Met  5.  Patient to be independent with home exercise program as demonstrated by performance with correct form without cues-Progress Toward  LTG/New Goal (to be met in 20 Rx, set 6/27) 6. ? Function: Oswestry 48%  functionally impaired     Patient goals:\"to feel better\"    Pt. Education:  [x] Yes  [] No  [] Reviewed Prior HEP/Ed  Method of Education: [x] Verbal  [x] Demo  [x] Written  Comprehension of Education:  [x] Verbalizes understanding. [] Demonstrates understanding. [] Needs review. [] Demonstrates/verbalizes HEP/Ed previously given. 7/8 HEP: seated LAQ, heel raises; supine janet (glut sets, abdom janet, add sets) and alt UE raises    Plan: [x] Continue current frequency toward long and short term goals.     [x] Specific Instructions for subsequent treatments: progress ex per Pt tolerance, progress HEP soon          Time In: 1402          Time Out: [address]    Electronically signed by:  Emilio Zurita, PT

## 2022-07-15 ENCOUNTER — HOSPITAL ENCOUNTER (OUTPATIENT)
Dept: PHYSICAL THERAPY | Age: 52
Setting detail: THERAPIES SERIES
Discharge: HOME | End: 2022-07-15
Payer: COMMERCIAL

## 2022-07-15 PROCEDURE — 97110 THERAPEUTIC EXERCISES: CPT

## 2022-07-15 NOTE — FLOWSHEET NOTE
[x] [facility]) - [facility] &  Therapy  [address]  [phone]  F: [phone]     Physical Therapy Daily Treatment Note    Date:  7/15/2022  Patient Name:  Allegra Segura    :  1970  MRN: [id number]  Physician: Barbara Salas MD                                 Insurance: Worker's Comp, (Auth for 20 total tx)  Medical Diagnosis:  LB muscle strain ICD-10: S39.012A                 Rehab Codes: R 53.82, R 26.89, M 54.59  Onset Date: 22                Next 's appt.:  back surgeon late July  Visit# / total visits:      Cancels/No Shows: 0/0      Subjective:    Pain:  [x] Yes  [] No Location: low back  Pain Rating: (0-10 scale) 7/10  Pain altered Tx:  [x] No  [x] Yes  Action:   Comments: Pt reports fatigue from stress test yesterday. During stress test he had to lay on his back for about 20 min, his back went numb. Objective:  Modalities: hot pack (large) to low back in supine for 15 minutes after exercises. -pt deferred ES  Precautions:  Exercises:   Exercise Reps/ Time Weight/ Level Comments Completed 7/15/2022   Nustep 6 mins L4 Progressed time 7/15 x          Prone   Over one pillow     Manual  --- mins  STM lumbar paraspinals and along posterior  iliac crest     Prone lying       Glut sets       Hamstring curls   Limited ROM to avoid pain    Heel ball squeeze       JEFF        Press ups              Seated       LAQs 10x  Held weight x     10x2  Held weight x   Alt arm raise  10x2   x   Lumbar flexion stretch 10x2  Red ball flexion x   Heel raises   2x10  Held weight x                 Supine         Hooklying LTR --  Unable to flex R knee enough to do     Piriformis stretch --  Unable to flex R knee enough to do     Glut sets  10x 5sec  x   Abdominal isometric  10x2 5sec  x   iso abdom / march --  6/15 Unable to flex R knee enough to do     Add sets    Dash [place] b/t knees, tolerates better if does not hold x   Alternating arms 10x2   x Supine with legs on wedge                       Quadruped   Not tolerated           Standing       Wall slide    Attempted 6/1 but not tolerated    B heel tap   6in Added 6/10-BUE support on //bar -held 6/15 due to dizzyness    Step up fwd  4in Held due to prior dizziness/lightheaded    Heel raises  10x2   x   HS curls  10x  Partly limited ROM R to improve tolerance x   Hip abd  5x  Added 7/15 x          Other:       Treatment Charges: Mins Units Time In/Out   []  Modalities        [x]  Ther Exercise 40 3 9680-1572   []  Neuromuscular Re-ed      []  Gait Training      []  Manual Therapy      []  Ther Activities      []  Aquatics      []  Vasocompression      []  Cervical Traction      [x]  Other - HP 15 -- 0541-8290   Total Treatment time 40            Assessment: [x] Progressing toward goals. Initiated standing hip abd to improved standing activities, Pt only able to perform 5 reps of. Cont HP at end of Rx to decrease pain. [x] No change in pain level. Pt requires cues for correct posture in during sitting ex. Seated march increases pain to 7.5/10. [] Other:    [x] Patient would continue to benefit from skilled physical therapy services in order to:  decrease LBP, Increase LE strength, tolerate standing, sitting and moving, return to normal duty at work. STG: (to be met in 12 treatments)  ? Pain: 6/10--Min Change  ? ROM:35 lumbar flexion, 15 lumbar extension-No Change   ? Strength: 4 R hip flexion without pain, 4+ R knee extension without pain, 4+ R DF without pain, 4 PF without pain--No Change   ? Function: 60 %  functionally impaired according to Oswestry--Met  Patient to be independent with home exercise program as demonstrated by performance with correct form without cues-Progress Toward  LTG/New Goal (to be met in 20 Rx, set 6/27)   ? Function: Oswestry 48%  functionally impaired     Patient goals:\"to feel better\"    Pt.  Education:  [x] Yes  [] No  [] Reviewed Prior HEP/Ed  Method of Education: [x] Verbal  [x] Demo  [x] Written  Comprehension of Education:  [x] Verbalizes understanding. [] Demonstrates understanding. [] Needs review. [] Demonstrates/verbalizes HEP/Ed previously given. 7/8 HEP: seated LAQ, heel raises; supine janet (glut sets, abdom janet, add sets) and alt UE raises    Plan: [x] Continue current frequency toward long and short term goals.     [x] Specific Instructions for subsequent treatments: progress ex per Pt tolerance, progress HEP           Time In: 1400          Time Out: 1503    Electronically signed by:  Trinh Lozano PT

## 2022-07-19 ENCOUNTER — HOSPITAL ENCOUNTER (OUTPATIENT)
Dept: PHYSICAL THERAPY | Age: 52
Setting detail: THERAPIES SERIES
Discharge: HOME | End: 2022-07-19
Payer: COMMERCIAL

## 2022-07-19 PROCEDURE — 97110 THERAPEUTIC EXERCISES: CPT

## 2022-07-19 NOTE — FLOWSHEET NOTE
[x] [facility]) - [facility] &  Therapy  [address]  [phone]  F: [phone]     Physical Therapy Daily Treatment Note    Date:  2022  Patient Name:  Marta Carrillo    :  1970  MRN: [id number]  Physician: Inocencio Libman, MD                                 Insurance: Worker's Comp, (Auth for 20 total tx)  Medical Diagnosis:  LB muscle strain ICD-10: S39.012A                 Rehab Codes: R 53.82, R 26.89, M 54.59  Onset Date: 22                Next 's appt.:  back surgeon   Visit# / total visits:      Cancels/No Shows: 0/0      Subjective:    Pain:  [x] Yes  [] No Location: low back  Pain Rating: (0-10 scale) 7/10  Pain altered Tx:  [x] No  [x] Yes  Action:   Comments: Pt reports stress test came back normal, doctor plans to do brain scan to cont to determine cause of dizziness. Pt reports he has had an eventful day, pain cont at 7/10. Objective:  Modalities: hot pack (large) to low back in supine for 15 minutes after exercises. -pt deferred ES  Precautions:  Exercises:   Exercise Reps/ Time Weight/ Level Comments Completed 2022   Nustep 6 mins L4  x          Prone   Over one pillow     Manual  --- mins  STM lumbar paraspinals and along posterior  iliac crest     Prone lying       Glut sets       Hamstring curls   Limited ROM to avoid pain    Heel ball squeeze       JEFF        Press ups              Seated       LAQs 10x  Held weight x   Marching  10x2  Held weight x   Alt arm raise  10x2   x   Lumbar flexion stretch 10x2  Red ball flexion x   Heel raises   2x10  Held weight x   Scap Retractions  10x 3sec Added  x          Supine         Hooklying LTR --  Unable to flex R knee enough to do     Piriformis stretch --  Unable to flex R knee enough to do     Glut sets  10x2 5sec  x   Abdominal isometric  10x2 5sec  x   iso abdom / march --  6/15 Unable to flex R knee enough to do     Add sets    [place] b/t knees, tolerates better if does not hold x   Alternating arms 10x2   x   Supine with legs on wedge                       Quadruped   Not tolerated           Standing       Wall slide    Attempted 6/1 but not tolerated    B heel tap   6in Added 6/10-BUE support on //bar -held 6/15 due to dizzyness    Step up fwd  4in Held due to prior dizziness/lightheaded    Heel raises  10x2   x   HS curls  10x  Partly limited ROM R to improve tolerance x   Hip abd  5x   x          Other:       Treatment Charges: Mins Units Time In/Out   []  Modalities        [x]  Ther Exercise 44 3 2617-5686   []  Neuromuscular Re-ed      []  Gait Training      []  Manual Therapy      []  Ther Activities      []  Aquatics      []  Vasocompression      []  Cervical Traction      [x]  Other - HP 15 -- 4247-7462   Total Treatment time 44            Assessment: [x] Progressing toward goals. Initiated scapular retractions to improve posture and decrease stress on back, Pt reports he can feel it in his low back. Cont HP at end of Rx to decrease pain. Oswestry 62% loss of function. [x] No change in pain level. Pt requires cues for correct posture during sitting ex. [] Other:    [x] Patient would continue to benefit from skilled physical therapy services in order to:  decrease LBP, Increase LE strength, tolerate standing, sitting and moving, return to normal duty at work. STG: (to be met in 12 treatments)  ? Pain: 6/10--Min Change  ? ROM:35 lumbar flexion, 15 lumbar extension-No Change   ? Strength: 4 R hip flexion without pain, 4+ R knee extension without pain, 4+ R DF without pain, 4 PF without pain--No Change   ? Function: 60 %  functionally impaired according to Oswestry--Met  Patient to be independent with home exercise program as demonstrated by performance with correct form without cues-Progress Toward  LTG/New Goal (to be met in 20 Rx, set 6/27)   ?  Function: Oswestry 48%  functionally impaired-No Change     Patient goals:\"to feel better\"    Pt. Education:  [x] Yes  [] No  [] Reviewed Prior HEP/Ed  Method of Education: [x] Verbal  [x] Demo  [x] Written  Comprehension of Education:  [x] Verbalizes understanding. [] Demonstrates understanding. [] Needs review. [] Demonstrates/verbalizes HEP/Ed previously given. 7/8 HEP: seated LAQ, heel raises; supine janet (glut sets, abdom janet, add sets) and alt UE raises    Plan: [x] Continue current frequency toward long and short term goals.     [x] Specific Instructions for subsequent treatments: progress ex per Pt tolerance, progress HEP           Time In: 5256          Time Out: 3862 [address]    Electronically signed by:  Lesly Leary PT

## 2022-07-22 ENCOUNTER — HOSPITAL ENCOUNTER (OUTPATIENT)
Dept: PHYSICAL THERAPY | Age: 52
Setting detail: THERAPIES SERIES
Discharge: HOME | End: 2022-07-22
Payer: COMMERCIAL

## 2022-07-22 PROCEDURE — 97110 THERAPEUTIC EXERCISES: CPT

## 2022-07-22 NOTE — FLOWSHEET NOTE
[x] [facility]) [facility] &  Therapy  [address]  [phone]  F: [phone]     Physical Therapy Daily Treatment Note    Date:  2022  Patient Name:  Tad Garcia    :  1970  MRN: [id number]  Physician: Camron Whitt MD                                 Insurance: Worker's Comp, (Auth for 20 total tx)  Medical Diagnosis:  LB muscle strain ICD-10: S39.012A                 Rehab Codes: R 53.82, R 26.89, M 54.59  Onset Date: 22                Next 's appt.:  back surgeon   Visit# / total visits:      Cancels/No Shows: 0/0      Subjective:    Pain:  [x] Yes  [] No Location: low back  Pain Rating: (0-10 scale) 7/10  Pain altered Tx:  [x] No  [x] Yes  Action:   Comments: Pt reports pain at 7/10 today. Pt reports he had blood work come back abnormal, he is to have more testing done. Pain at worst 8.5/10, was on his feet a lot the day before w/ appt, pain stayed that high, with numbness for approx 1 hour, had to use heating pad soak in tub and take 4 tylenol to bring it down. Pt was also very dizzy that day. Today Pt is experiencing off and on pain back of R thigh. Objective:  Modalities: hot pack (large) to low back in supine for 15 minutes after exercises. -pt deferred ES  Precautions:  Exercises:   Exercise Reps/ Time Weight/ Level Comments Completed 2022   Nustep 6 mins L4  x          Prone   Over one pillow     Manual  --- mins  STM lumbar paraspinals and along posterior  iliac crest     Prone lying       Glut sets       Hamstring curls   Limited ROM to avoid pain    Heel ball squeeze       JEFF        Press ups              Seated       LAQs 10x  Held weight x   Marching  10x2  Held weight x   Alt arm raise  10x2   x   Lumbar flexion stretch 10x2  Red ball flexion x   Heel raises   2x10  Held weight x   Scap Retractions  10x 3sec  x          Supine         Hooklying LTR --  Unable to flex R knee enough to do     Piriformis stretch --  Unable to flex R knee enough to do     Glut sets  10x2 5sec  x   Abdominal isometric  10x2 5sec  x   iso abdom w/ march --  6/15 Unable to flex R knee enough to do     Add sets    10x2  Ball b/t knees, tolerates better if does not hold x   Alternating arms 10x2   x   Supine with legs on wedge                       Quadruped   Not tolerated           Standing       Wall slide    Attempted 6/1 but not tolerated    B heel tap   6in Added 6/10-BUE support on //bar -held 6/15 due to dizzyness    Step up fwd  4in Held due to prior dizziness/lightheaded    Heel raises  10x2   x   HS curls  10x  Partly limited ROM R to improve tolerance x   Hip abd  5x   x          Other:     Test Measurements:    7/22 ROM DEGREES A/P STRENGTH      RIGHT   HIP FLEX   3+p   HIPEXT       HIP ABD               KNEE FLEX       KNEE EXT   3+p           ANKLE DF   4-p           Lumbar Flex 23°p         Ext 21°p              Treatment Charges: Mins Units Time In/Out   []  Modalities        [x]  Ther Exercise 55 4 [id number]   []  Neuromuscular Re-ed      []  Gait Training      []  Manual Therapy      []  Ther Activities      []  Aquatics      []  Vasocompression      []  Cervical Traction      [x]  Other -  15 -- 2361-4146   Total Treatment time             Assessment: [x] Progressing toward goals. Educated Pt in ex for HEP, issued handouts, Pt w/good knowledge of current ex program.  Pt questioning Re using resistance band for ex at home, educated is ok to try, issued peach band for UE and LE ex, educated in set-up. Instructed Pt to only use tband as pain allows and to monitor pain levels after using tband. Pt w/improved lumbar ext ROM. [x] No change in pain level. Pt requires cues for correct posture during sitting ex. Pt cont w/limited tolerance ex due to pain.       [] Other:    [x] Patient would continue to benefit from skilled physical therapy services in order to:  decrease LBP, Increase LE strength, tolerate standing, sitting and moving, return to normal duty at work. STG: (to be met in 12 treatments)  ? Pain: 6/10--Min Change  ? ROM:35 lumbar flexion, 15 lumbar extension-Partially Met-met for Lumbar ext  ? Strength: 4 R hip flexion without pain, 4+ R knee extension without pain, 4+ R DF without pain, 4 PF without pain--No Change   ? Function: 60 %  functionally impaired according to Oswestry--Met  Patient to be independent with home exercise program as demonstrated by performance with correct form without cues--Met  LTG/New Goal (to be met in 20 Rx, set 6/27)   ? Function: Oswestry 48%  functionally impaired-No Change     Patient goals:\"to feel better\"    Pt. Education:  [x] Yes  [] No  [] Reviewed Prior HEP/Ed  Method of Education: [x] Verbal  [x] Demo  [x] Written  Comprehension of Education:  [x] Verbalizes understanding. [x] Demonstrates understanding. [] Needs review. [x] Demonstrates/verbalizes HEP/Ed previously given. 7/8 HEP: seated LAQ, heel raises; supine janet (glut sets, abdom janet, add sets) and alt UE raises    7/22 HEP-Access Code: [email or web address]  URL: [email or web address]  Seated Scapular Retraction - 3-5 x daily - 7 x weekly - 10 reps - 5 seconds hold  Seated Heel Toe Raises - 2-3 x daily - 7 x weekly - 20 reps  Seated March - 2-3 x daily - 7 x weekly - 20 reps  Seated Long Arc Quad - 2-3 x daily - 7 x weekly - 20 reps  Supine Transversus Abdominis Bracing - Hands on Stomach - 3 x daily - 7 x weekly - 10 reps - 5seconds hold  Hooklying Gluteal Sets - 3 x daily - 7 x weekly - 10 reps - 5sec hold  Supine Hip Adduction Isometric with Ball - 3 x daily - 7 x weekly - 10 reps - 5 seconds hold  Dead Bug Alternating Arm Extension - 2-3 x daily - 7 x weekly - 20 reps  Heel Toe Raises with Unilateral Counter Support - 2-3 x daily - 7 x weekly - 20 reps  Standing Knee Flexion - 1-2 x daily - 7 x weekly - 20-30 reps      Plan: [] Continue current frequency toward long and short term goals.     [x] Specific Instructions for subsequent treatments: Pt at end of [facility] auth, will discharge to [facility]          Time In: 1354         Time Out: 1512    Electronically signed by:  Niurka Puentes PT

## 2022-07-22 NOTE — DISCHARGE SUMMARY
[x] [facility]  [address]  [phone]  F: [phone]         Physical Therapy Discharge Note    Date: 2022      Patient: Casie Hammond  : 1970  MRN: [id number]    Physician: Abbie Lopez MD                                 Insurance: Worker's Comp, (Auth for 20 total tx)  Medical Diagnosis:  LB muscle strain ICD-10: S39.012A                 Rehab Codes: R 53.82, R 26.89, M 54.59  Onset Date: 22                Next 's appt.:  unknown       Total visits attended: 21  Cancels/No shows: 0/0  Date of initial visit: 2022                Date of final visit: 2022      Subjective:  Pain:  [x] Yes  [] No   Location: low back                 Pain Rating: (0-10 scale) 7/10  Pain altered Tx:  [x] No  [x] Yes  Action:  Comments: Pt reports pain at 7/10 at final Rx, is experiencing off and on pain back of R thigh. Pain at worst 8.5/10, was on his feet a lot the day before that, pain stayed that high, with numbness for approx 1 hour, had to use heating pad soak in tub and take 4 tylenol to bring it down. Pt was also very dizzy that day. Objective:  Test Measurements:      ROM DEGREES A/P STRENGTH       RIGHT   HIP FLEX   3+p   HIPEXT       HIP ABD               KNEE FLEX       KNEE EXT   3+p           ANKLE DF   4-p           Lumbar Flex 23°p         Ext 21°p            Function: Oswestry 62% loss of function. Assessment:  STG:(to be met in 12 treatments)  ? Pain: 6/10--Min Change  ? ROM:35 lumbar flexion, 15 lumbar extension-Partially Met-met for Lumbar ext  ? Strength: 4 R hip flexion without pain, 4+ R knee extension without pain, 4+ R DF without pain, 4 PF without pain--No Change  ?  Function: 60 %  functionally impaired according to Oswestry--Met  Patient to be independent with home exercise program as demonstrated by performance with correct form without cues--Met  LTG:New Goal (to be met in 20 Rx, set ) ? Function: Oswestry 48%  functionally impaired-No Change       Treatment to Date:  [x] Therapeutic Exercise    [] Modalities:  [] Therapeutic Activity    [] Ultrasound  [x] Electrical Stimulation  [] Gait Training     [] Massage       [] Lumbar/Cervical Traction  [] Neuromuscular Re-education [x] Cold/hotpack [] Iontophoresis: 4 mg/mL  [x] Instruction in Home Exercise Program                     Dexamethasone Sodium  [] Manual Therapy             Phosphate 40-80 mAmin  [] Aquatic Therapy                   [] Vasocompression/    [] Other:             Game Ready    Discharge Status:     [] Pt recovered from conditions. Treatment goals were met. [x] Pt received maximum benefit. No further therapy indicated at this time. [x] Pt to continue exercise/home instructions independently. [] Therapy interrupted due to:    [] Pt has 2 or more no shows/cancels, is discontinued per our policy. [x] Pt has completed prescribed number of treatment sessions. [x] Other: Pt at end of [address]. Pt w/minimal progress. Pt cont w/limited tolerance ex due to pain. Recommend further evaluation by physician. Electronically signed by Moses Obrien PT on 7/22/2022 at 2:43 PM        If you have any questions or concerns, please don't hesitate to call.   Thank you for your referral.

## 2022-08-30 ENCOUNTER — HOSPITAL ENCOUNTER (OUTPATIENT)
Dept: PHYSICAL THERAPY | Age: 52
Setting detail: THERAPIES SERIES
Discharge: HOME | End: 2022-08-30

## 2022-08-30 NOTE — FLOWSHEET NOTE
[x] [facility]  [address]    [phone]  F: [phone]          Physical Therapy Cancel/No Show note    Date: 2022  Patient: Mary Jo Greene  : 1970  MRN: [id number]    Cancels/No Shows to date:     For today's appointment patient:    [x]  Cancelled    [x] Rescheduled appointment    [] No-show     Reason given by patient:    []  Patient ill    []  Conflicting appointment    [] No transportation      [] Conflict with work    [] No reason given    [] Weather related    [] COVID-19    [x] Other:   \"Several Issues\"   Comments:   Pt cancelled for initial evaluation for Physical Therapy for LBP. Pt rescheduled for Tues 2022.         [] Next appointment was confirmed    Electronically signed by: Luis Ibanez PT

## 2022-09-06 ENCOUNTER — HOSPITAL ENCOUNTER (OUTPATIENT)
Dept: PHYSICAL THERAPY | Age: 52
Setting detail: THERAPIES SERIES
Discharge: HOME | End: 2022-09-06
Payer: COMMERCIAL

## 2022-09-06 PROCEDURE — 97110 THERAPEUTIC EXERCISES: CPT

## 2022-09-06 PROCEDURE — 97164 PT RE-EVAL EST PLAN CARE: CPT

## 2022-09-06 NOTE — PROGRESS NOTES
[x] [facility]  [address]  [phone]  F: [phone]         Physical Therapy-Re-Eval    Date: 2022      Patient: Kassy Hummel  : 1970  MRN: [id number]    Physician: Junior Della MD     Insurance: Worker's Comp, (Auth for 8 tx)  Medical Diagnosis:  LB muscle strain ICD-10: S39.012A        Rehab Codes: R 53.82, R 26.89, M 54.59  Onset Date: 22   Next  [address]: 2022 WC, 2022        Subjective:  Pain:  [x] Yes  [] No  Location: LB Pain Rating: (0-10 scale) 7-8/10  Pain altered Tx:  [x] No  [] Yes  Action:  Comments: Pt returning to Rx after 6 weeks off awaiting WC auth. Pt reports back has stayed the same, possibly even a little worse. Pain and numbness cont in center of LB, expands \"spider webs out\" when really starts working it. Pt feels R LE has been getting weaker. Sitting up straight and bending over aggravates pain. When lays on back gets uncomfortable, so has to lay in his side. Pain at worst 9.5/10 when had walked a large amount cleaning his apartment. Only things that decrease pain is heat, cold, and tylenol/pain meds, they only help a little bit. Pt reports doing HEP while off, (except ball ex as did not have a ball, also notes while trying to do ex with pillows instead of ball laying on back was aggravating it). Pt reports has fallen a few times due to dizziness, is awaiting further testing (MRI of brain postponed due to insurance disrupted). Pt is to see spinal surgeon next week. Objective:  Test Measurements:   ROM DEGREES A/P STRENGTH  STRENGTH      RIGHT RIGHT LEFT   HIP FLEX  3p 4p   HIPEXT  3 3   HIP ABD             KNEE FLEX  3 4   KNEE EXT  3 4+          ANKLE DF  3+ 4+          Lumbar Flex 28°p         Ext 9°p       p=pain    JEFF: \"just numb\";  \"strong 7\"/10  Press Ups: x3 \"squeezing\" in middle/R LB up 8/10 during, numb afterward, can't feel anything    Function: Oswestry 48% loss of function      Assessment: Patient would benefit from skilled physical therapy services in order to: decrease LBP, increase LE strength, increase lumbar ROM, increase tolerance to standing, sitting up straight, moving, walking and bending, and allow Pt to return to work. Problems:    [x] ? Pain: LBP, 7-8/10 this date, 9.5/10 at worst  [x] ? ROM: Lumbar flexion, extension  [x] ? Strength: B LE, spinal stab muscles   [x] ? Function: 48% functionally impaired according to Oswestry  [x] Other:      STG: (to be met in 8 treatments)  ? Pain: LBP 5/10 average pain, 7/10 at worst   ? ROM: Lumbar flex 40°, ext 15°  ? Strength: B hips 3+/5, R knee 3+/5, R DF 4-/5, spinal stab muscles as evidenced by ability to perform beginning spinal stab ex program  ? Function: Oswestry 40% loss of function  Patient to be independent with home exercise program as demonstrated by performance with correct form without cues. Treatment Plan:  [x] Therapeutic Exercise   71009  [] Iontophoresis: 4 mg/mL Dexamethasone Sodium Phosphate  mAmin  74563   [x] Therapeutic Activity  03099 [] Vasopneumatic cold with compression  70135    [] Gait Training   69848 [x] Ultrasound   76930   [] Neuromuscular Re-education  00429 [x] Electrical Stimulation Unattended  22725   [x] Manual Therapy  76547 [] Electrical Stimulation Attended  64330   [x] Instruction in HEP  [] Lumbar/Cervical Traction  17639   [x] Aquatic Therapy   87779 [x] Cold/hotpack    [] Massage   71859      [x] Dry Needling, 1 or 2 muscles  04296   [] Biofeedback, first 15 minutes   10121  [] Biofeedback, additional 15 minutes   07488 [x] Dry Needling, 3 or more muscles  92930       Patient Status:     [x] Continue per initial plan of care. [] Additional visits necessary. [] Other:       Requested Frequency/Duration: 2 times per week for 8 treatments.     Todays Treatment:  Modalities: HP LB (large) seated in chair at end of Rx  Precautions:  Exercises:  Exercise Reps/ Time Weight/ Level Comments   JEFF 5 min       Glute set prone 10x 5 sec     Prone heel ball squeeze     10x 5 sec     Alt Hamstring curls 8x                Other: Pt would benefit from aquatic ex due to buoyancy of water decreasing weight bearing forces on LB-will need to included on C9/WC auth. Specific Instructions for subsequent treatments-resume spinal stab ex per Pt tolerance, begin NuStep    Treatment Charges: Mins Units   [x] Re-Evaluation        29 1   [x]  Modalities HP 10  --   [x]  Ther Exercise 9 1   []  Manual Therapy       []  Ther Activities       []  Aquatics       []  Vasocompression       []  Other:         TOTAL TREATMENT TIME: 38 min     Time in: 7671     Time out: 7062    Electronically signed by Lesly Leary PT         If you have any questions or concerns, please don't hesitate to call. Thank you for your referral.    Physician Signature:________________________________Date:__________________  By signing above or cosigning this note, I have reviewed this plan of care and certify a need for medically necessary rehabilitation services.      *PLEASE SIGN ABOVE AND FAX BACK ALL PAGES*

## 2022-09-13 ENCOUNTER — HOSPITAL ENCOUNTER (OUTPATIENT)
Dept: PHYSICAL THERAPY | Age: 52
Setting detail: THERAPIES SERIES
Discharge: HOME | End: 2022-09-13
Payer: COMMERCIAL

## 2022-09-13 PROCEDURE — 97530 THERAPEUTIC ACTIVITIES: CPT

## 2022-09-13 PROCEDURE — 97110 THERAPEUTIC EXERCISES: CPT

## 2022-09-13 NOTE — FLOWSHEET NOTE
[x] [facility]  [address]  [phone]  F: [phone]         Physical Therapy Daily Treatment Note    Date:  2022  Patient Name:  Maggi Mathew    :  1970  MRN: [id number]  Physician: Gianna Kay MD                      Insurance: Worker's Comp, (Auth for 8 tx)  Medical Diagnosis:  LB muscle strain ICD-10: S39.012A        Rehab Codes: R 53.82, R 26.89, M 54.59  Onset Date: 22                 Next  [address]: 2022 WC;  spine specialist   Visit# / total visits: ; Recheck goals due at visit 8     Cancels/No Shows: 0/0    Subjective:    Pain:  [x] Yes  [] No Location: LB N/A Pain Rating: (0-10 scale) 7/10  Pain altered Tx:  [x] No  [] Yes  Action:  Comments: Pt reports he sees spine specialist (at [facility]) tomorrow. Reports spine specialist put him on a new medicine at last appt, but it didn't work. Pt reports HP at last Rx helped. Objective:  Modalities: HP (large) LB, seated in chair, at end of Rx  Precautions:  Exercises:  Exercise Reps/ Time Weight/ Level Comments   Sci-fit 6 min ML3 Initiated          Standing    Added , cues for abdom janet    Calf stretch  3x 20sec    HR  15x     HS curls  10x                 Prone      JEFF 2 min  Added , Pt reports numbness in LB at end   [facility] set prone 10x2 5sec Progressed reps    Prone heel ball squeeze 10x2 5sec Progressed reps    Alt Hamstring curls 10x 2  Progressed reps                            Other:      Treatment Charges: Mins Units   [x]  Modalities 15 --   [x]  Ther Exercise 34 2   []  Manual Therapy     [x]  Ther Activities (during HP) 10 1   []  Aquatics     []  Vasocompression     []  Other     Total Treatment time 44        Assessment: [x] Progressing toward goals. Initiated SciFit to increase circulation to LB to aid healing. Initiated standing ex to improve standing activities and walking.   Cont HP at end of Rx to decrease pain and tightness. Pt reports he has bulging disc and degenerative disc disease, questioning what these are and how long recovery is if has surgery. During HP educated Pt w/spine model Re spinal anatomy, including discs cushioning between vertebrae, and how bulging discs can put pressure on nerves as exit spine and decreased disk height causing bones to put pressure on nerves. Educated Pt recovery time from surgery depends on type of surgery done, discectomies and laminectomies are fairly short recovery times, more involved surgeries require longer recovery times. [] No change. [] Other:  [x] Patient would continue to benefit from skilled physical therapy services in order to: decrease LBP, increase LE strength, increase lumbar ROM, increase tolerance to standing, sitting up straight, moving, walking and bending, and allow Pt to return to work. STG: (to be met in 8 treatments)  ? Pain: LBP 5/10 average pain, 7/10 at worst   ? ROM: Lumbar flex 40°, ext 15°  ? Strength: B hips 3+/5, R knee 3+/5, R DF 4-/5, spinal stab muscles as evidenced by ability to perform beginning spinal stab ex program  ? Function: Oswestry 40% loss of function  Patient to be independent with home exercise program as demonstrated by performance with correct form without cues. Pt. Education:  [x] Yes  [] No  [] Reviewed Prior HEP/Ed  Method of Education: [x] Verbal  [x] Demo  [] Written  Comprehension of Education:  [x] Verbalizes understanding-spinal anatomy, biomechanics of bulging discs and degenerative disc disease. [] Demonstrates understanding. [] Needs review. [] Demonstrates/verbalizes HEP/Ed previously given. Plan: [x] Continue current frequency toward long and short term goals.     [x] Specific Instructions for subsequent treatments: progress spinal stab ex per Pt tolerance      Time In:1048            Time Out: 2375    Electronically signed by:  Celsa Antoine PT

## 2022-09-15 ENCOUNTER — HOSPITAL ENCOUNTER (OUTPATIENT)
Dept: PHYSICAL THERAPY | Age: 52
Setting detail: THERAPIES SERIES
Discharge: HOME | End: 2022-09-15
Payer: COMMERCIAL

## 2022-09-15 PROCEDURE — 97110 THERAPEUTIC EXERCISES: CPT

## 2022-09-15 NOTE — FLOWSHEET NOTE
[x] [facility]  [address]  [phone]  F: [phone]         Physical Therapy Daily Treatment Note    Date:  9/15/2022  Patient Name:  Maggi Mathew    :  1970  MRN: [id number]  Physician: Gianna Kay MD                      Insurance: Worker's Comp, (Auth for 8 tx)  Medical Diagnosis:  LB muscle strain ICD-10: S39.012A        Rehab Codes: R 53.82, R 26.89, M 54.59  Onset Date: 22                 Next  [address]: 2022 WC;  spine specialist   Visit# / total visits: 3/8; Recheck goals due at visit 8     Cancels/No Shows: 0/0    Subjective:    Pain:  [x] Yes  [] No Location: LB N/A Pain Rating: (0-10 scale) 8/10  Pain altered Tx:  [x] No  [] Yes  Action:  Comments: Pt reports he saw spine specialist, is referring him to pain management and a type of aerobic therapy. Objective:  Modalities: HP (large) LB, seated in chair, at end of Rx  Precautions:  Exercises:  Exercise Reps/ Time Weight/ Level Comments   Sci-fit 8 min ML3 Progressed time 9/15         Standing    cues for abdom janet    Calf stretch  3x 20sec    HR  15x     HS curls  10x           Seated       Heel/toe raises  20x  Added 9/15   Add sets  10x 3 sec  Ball, Added 9/15   Abdom janet                   Prone      JEFF 2 min  , Pt reports numbness in LB at end   [facility] set prone 10x2 5sec    Prone heel ball squeeze 10x2 5sec    Alt Hamstring curls 10x 2                 Manual 5 min  STM w/Hypervolt to B LB, buttock, in prone, low pressure, added 9/15, Pt c/o pain L LB, reports increasing numbness         Other:      Treatment Charges: Mins Units   [x]  Modalities 15 --   [x]  Ther Exercise 29 2   [x]  Manual Therapy 5 --   []  Ther Activities      []  Aquatics     []  Vasocompression     []  Other     Total Treatment time 34 min        Assessment: [x] Progressing toward goals.  Initiated seated heel toe raises and add sets to increase spinal stab strength and improve seated activities. Trial STM w/hypervolt to decrease muscle tightness, Pt complains of pain and increased numbness with. Cont HP at end of Rx to decrease pain and tightness. [] No change. [] Other:  [x] Patient would continue to benefit from skilled physical therapy services in order to: decrease LBP, increase LE strength, increase lumbar ROM, increase tolerance to standing, sitting up straight, moving, walking and bending, and allow Pt to return to work. STG: (to be met in 8 treatments)  ? Pain: LBP 5/10 average pain, 7/10 at worst   ? ROM: Lumbar flex 40°, ext 15°  ? Strength: B hips 3+/5, R knee 3+/5, R DF 4-/5, spinal stab muscles as evidenced by ability to perform beginning spinal stab ex program  ? Function: Oswestry 40% loss of function  Patient to be independent with home exercise program as demonstrated by performance with correct form without cues. Pt. Education:  [x] Yes  [] No  [] Reviewed Prior HEP/Ed  Method of Education: [x] Verbal  [x] Demo  [] Written  Comprehension of Education:  [x] Verbalizes understanding-spinal anatomy, biomechanics of bulging discs and degenerative disc disease. [] Demonstrates understanding. [] Needs review. [] Demonstrates/verbalizes HEP/Ed previously given. Plan: [x] Continue current frequency toward long and short term goals.     [x] Specific Instructions for subsequent treatments: progress spinal stab ex per Pt tolerance, monitor response to Hypervolt, consider pelvic traction, add seated abdom janet       Time In:1358            Time Out: 9100    Electronically signed by:  Lesly Leary, PT

## 2022-09-20 ENCOUNTER — HOSPITAL ENCOUNTER (OUTPATIENT)
Dept: PHYSICAL THERAPY | Age: 52
Setting detail: THERAPIES SERIES
Discharge: HOME | End: 2022-09-20
Payer: COMMERCIAL

## 2022-09-20 PROCEDURE — 97110 THERAPEUTIC EXERCISES: CPT

## 2022-09-20 NOTE — FLOWSHEET NOTE
[x] [facility]  [address]  [phone]  F: [phone]         Physical Therapy Daily Treatment Note    Date:  2022  Patient Name:  Casie Hammond    :  1970  MRN: [id number]  Physician: Abbie Lopez MD                      Insurance: Worker's Comp, (Auth for 8 tx)  Medical Diagnosis:  LB muscle strain ICD-10: S39.012A        Rehab Codes: R 53.82, R 26.89, M 54.59  Onset Date: 22                 Next  [address]: 2022 [facility]  Visit# / total visits: ; Recheck goals due at visit 8     Cancels/No Shows: 0/0 (since  Re-Eval)    Subjective:    Pain:  [x] Yes  [] No Location: LB N/A Pain Rating: (0-10 scale) 8/10  Pain altered Tx:  [x] No  [] Yes  Action:  Comments: Pt reports hypervolt at last Rx aggravated his pain.       Objective:  Modalities: HP (large) LB, seated in chair, at end of Rx  Precautions:  Exercises:  Exercise Reps/ Time Weight/ Level Comments   Sci-fit 8 min ML3          Standing    cues for abdom janet    Calf stretch  3x 30sec Progressed hold time    HR  20x  Progressed reps    HS curls  15x  Progressed reps 20         Seated       Heel/toe raises  20x     Add sets  10x 3 sec  Ball   Abdom janet  10x 3sec Added    HS curls  10x lime Added          Prone      JEFF 2 min     Glute set 10x2 5sec    Prone heel ball squeeze 10x2 5sec    Alt Hamstring curls 10x 2                 Manual -- min  STM w/Hypervolt to B LB, buttock, in prone, low pressure, added 9/15, Pt c/o pain L LB, reports increasing numbness         Other:         Treatment Charges: Mins Units Time In/Out   [x]  Modalities-HP 15  --  8465-1903   [x]  Ther Exercise 34 4 1404-7190   []  Neuromuscular Re-ed      []  Gait Training      []  Manual Therapy      []  Ther Activities      []  Aquatics      []  Vasocompression      []  Cervical Traction      []  Other      Total Treatment time 34 min     Scifit [id number]       Assessment: [x] Progressing toward goals. Initiated seated abdom janet and HS curls to increase trunk and LE strength and improve seated and standing activities. Pt w/dizzyness when getting up after prone ex, reports is better if closes his eyes. Held hypervolt as Pt reports it aggravated his pain last Rx. Cont HP at end of Rx to decrease pain and tightness. [] No change. [] Other:  [x] Patient would continue to benefit from skilled physical therapy services in order to: decrease LBP, increase LE strength, increase lumbar ROM, increase tolerance to standing, sitting up straight, moving, walking and bending, and allow Pt to return to work. STG: (to be met in 8 treatments)  ? Pain: LBP 5/10 average pain, 7/10 at worst   ? ROM: Lumbar flex 40°, ext 15°  ? Strength: B hips 3+/5, R knee 3+/5, R DF 4-/5, spinal stab muscles as evidenced by ability to perform beginning spinal stab ex program  ? Function: Oswestry 40% loss of function  Patient to be independent with home exercise program as demonstrated by performance with correct form without cues. Pt. Education:  [x] Yes  [] No  [] Reviewed Prior HEP/Ed  Method of Education: [x] Verbal  [x] Demo  [] Written  Comprehension of Education:  [x] Verbalizes understanding-spinal anatomy, biomechanics of bulging discs and degenerative disc disease. [] Demonstrates understanding. [] Needs review. [] Demonstrates/verbalizes HEP/Ed previously given. Plan: [x] Continue current frequency toward long and short term goals.     [x] Specific Instructions for subsequent treatments: progress spinal stab ex per Pt tolerance, consider pelvic traction, update HEP      Time In:1359            Time Out: 5695     Electronically signed by:  Jose Abreu PT

## 2022-09-22 ENCOUNTER — HOSPITAL ENCOUNTER (OUTPATIENT)
Dept: PHYSICAL THERAPY | Age: 52
Setting detail: THERAPIES SERIES
Discharge: HOME | End: 2022-09-22
Payer: COMMERCIAL

## 2022-09-22 PROCEDURE — 97110 THERAPEUTIC EXERCISES: CPT

## 2022-09-22 NOTE — FLOWSHEET NOTE
[x] [facility]  [address]  [phone]  F: [phone]         Physical Therapy Daily Treatment Note    Date:  2022  Patient Name:  Hi Herr    :  1970  MRN: [id number]  Physician: Claudell Mans, MD                      Insurance: Worker's Comp, (Auth for 8 tx)  Medical Diagnosis:  LB muscle strain ICD-10: S39.012A        Rehab Codes: R 53.82, R 26.89, M 54.59  Onset Date: 22                 Next  [address]: 2022 WC  Visit# / total visits: ; Recheck goals due at visit 8     Cancels/No Shows: 0/0 (since  Re-Eval)    Subjective:    Pain:  [x] Yes  [] No Location: LB N/A Pain Rating: (0-10 scale) 8/10  Pain altered Tx:  [x] No  [] Yes  Action:  Comments: Pt reports no change.   Pt reports WC authorized for him to see specialist.      Objective:  Modalities: HP (large) LB, seated in chair, at end of Rx  Precautions:  Exercises:  Exercise Reps/ Time Weight/ Level Comments   Sci-fit 8 min ML3          Standing    cues for abdom janet    Calf stretch  3x 30sec    HR  20x     HS curls  15x           Seated       Heel/toe raises  20x2  Progressed reps    Add sets  10x 3 sec  Ball    Abdom janet  10x 3sec    HS curls  10x lime    Scap Retractions  *  Add next Rx         Prone      JEFF 2 min     Glute set 10x2 5sec    Prone heel ball squeeze 10x2 5sec    Alt Hamstring curls 10x 2                 Manual Hold   STM w/Hypervolt to B LB, buttock, in prone, low pressure, added 9/15, Pt c/o pain L LB, reports increasing numbness         Other:         Treatment Charges: Mins Units Time In/Out   [x]  Modalities-HP 15  -- 6722-3111   [x]  Ther Exercise 34 9 9803-1480   []  Neuromuscular Re-ed      []  Gait Training      []  Manual Therapy      []  Ther Activities      []  Aquatics      []  Vasocompression      []  Cervical Traction      []  Other      Total Treatment time 33 min     Scifit [phone]      Assessment: [x] Progressing toward goals. Progressed reps seated heel toe raises. Pt nonverbals indicated difficulty with exercises. Discussed pelvic traction w/Pt, Pt requested hold this Rx and try at next Rx. Cont HP at end of Rx to decrease pain and tightness. [] No change. [] Other:  [x] Patient would continue to benefit from skilled physical therapy services in order to: decrease LBP, increase LE strength, increase lumbar ROM, increase tolerance to standing, sitting up straight, moving, walking and bending, and allow Pt to return to work. STG: (to be met in 8 treatments)  ? Pain: LBP 5/10 average pain, 7/10 at worst   ? ROM: Lumbar flex 40°, ext 15°  ? Strength: B hips 3+/5, R knee 3+/5, R DF 4-/5, spinal stab muscles as evidenced by ability to perform beginning spinal stab ex program  ? Function: Oswestry 40% loss of function  Patient to be independent with home exercise program as demonstrated by performance with correct form without cues. Pt. Education:  [x] Yes  [] No  [] Reviewed Prior HEP/Ed  Method of Education: [x] Verbal  [x] Demo  [] Written  Comprehension of Education:  [x] Verbalizes understanding-spinal anatomy, biomechanics of bulging discs and degenerative disc disease. [] Demonstrates understanding. [] Needs review. [] Demonstrates/verbalizes HEP/Ed previously given. 9/22 HEP-Access Code: [email or web address]  URL: [email or web address]  Lying Prone - 5-7 x daily - 7 x weekly - 5-10 minutes hold  Prone Gluteal Sets - 3 x daily - 7 x weekly - 2 sets - 10 reps - 5 seconds hold  Prone Knee Flexion - 3 x daily - 7 x weekly - 2 sets - 20 reps  Prone Heel Squeeze - 3 x daily - 7 x weekly - 2 sets - 10 reps - 5 seconds hold      Plan: [x] Continue current frequency toward long and short term goals.     [x] Specific Instructions for subsequent treatments: progress spinal stab ex per Pt tolerance, consider pelvic traction, add scap retractions       Time In:1400           Time Out: 1500 Electronically signed by:  Jo Cody, PT

## 2022-09-27 ENCOUNTER — HOSPITAL ENCOUNTER (OUTPATIENT)
Dept: PHYSICAL THERAPY | Age: 52
Setting detail: THERAPIES SERIES
Discharge: HOME | End: 2022-09-27
Payer: COMMERCIAL

## 2022-09-27 PROCEDURE — 97110 THERAPEUTIC EXERCISES: CPT

## 2022-09-27 NOTE — FLOWSHEET NOTE
[x] [facility]  [address]  [phone]  F: [phone]         Physical Therapy Daily Treatment Note    Date:  2022  Patient Name:  Teja Rhodes    :  1970  MRN: [id number]  Physician: Bianca Chang MD                      Insurance: Worker's Comp, (Auth for 8 tx)  Medical Diagnosis:  LB muscle strain ICD-10: S39.012A        Rehab Codes: R 53.82, R 26.89, M 54.59  Onset Date: 22                 Next  [address]: 2022 [facility]  Visit# / total visits: ; Recheck goals due at visit 8     Cancels/No Shows: 0/0 (since  Re-Eval)    Subjective:    Pain:  [x] Yes  [] No Location: LB N/A Pain Rating: (0-10 scale) 8/10  Pain altered Tx:  [x] No  [] Yes  Action:  Comments: Pt reports he fell , at home, from being dizzy, landed on his face. Pt having increased pain in back since that time. Pain was at 8.5/10 until took tylenol, tylenol brought it down to 8/10 by Rx time.       Objective:  Modalities: HP (large) LB, seated in chair, at end of Rx  Precautions:  Exercises:  Exercise Reps/ Time Weight/ Level Comments   Sci-fit 8 min ML3          Standing    cues for abdom janet    Calf stretch  3x 30sec    HR  20x     HS curls  15x           Seated       Heel/toe raises  20x2     Add sets  10x 3 sec  Ball    Abdom janet  10x 3sec    HS curls  15x lime Progressed reps    Scap Retractions  10x 3sec Added          Prone    Held prone ex per Pt request, preferred to try supine this date    JEFF      Glute set  5sec    Prone heel ball squeeze  5sec    Alt Hamstring curls            Supine    Added Per Pt request    Abdom janet 10x 3sec    Glut set  10x 5sec    Add sets  10x 5sec    Heel slides  5x  W/orange slide tube, increasing pain         Manual Hold   STM w/Hypervolt to B LB, buttock, in prone, low pressure, added 9/15, Pt c/o pain L LB, reports increasing numbness         Other:         Treatment Charges: Mins Units Time In/Out   [x]  Modalities-HP 15  --  3623-8690   [x]  Ther Exercise 40 3 1042-6295   []  Neuromuscular Re-ed      []  Gait Training      []  Manual Therapy      []  Ther Activities      []  Aquatics      []  Vasocompression      []  Cervical Traction      []  Other      Total Treatment time 40 min     Scifit [id number]      Assessment: [x] Progressing toward goals. Pt requests do supine ex instead of prone this date. Pt nonverbals consistent w/pain and difficulty with exercises. During supine ex Pt reports his co-ordination is off today. Due to fall, Pt again requested hold pelvic traction this Rx, will consider for next Rx. Cont HP at end of Rx to decrease pain and tightness. [] No change. [] Other:  [x] Patient would continue to benefit from skilled physical therapy services in order to: decrease LBP, increase LE strength, increase lumbar ROM, increase tolerance to standing, sitting up straight, moving, walking and bending, and allow Pt to return to work. STG: (to be met in 8 treatments)  ? Pain: LBP 5/10 average pain, 7/10 at worst   ? ROM: Lumbar flex 40°, ext 15°  ? Strength: B hips 3+/5, R knee 3+/5, R DF 4-/5, spinal stab muscles as evidenced by ability to perform beginning spinal stab ex program  ? Function: Oswestry 40% loss of function  Patient to be independent with home exercise program as demonstrated by performance with correct form without cues. Pt. Education:  [x] Yes  [] No  [] Reviewed Prior HEP/Ed  Method of Education: [x] Verbal  [x] Demo  [] Written  Comprehension of Education:  [x] Verbalizes understanding-spinal anatomy, biomechanics of bulging discs and degenerative disc disease. [] Demonstrates understanding. [] Needs review. [] Demonstrates/verbalizes HEP/Ed previously given. 9/22 HEP-Access Code: [email or web address]  URL: [email or web address]  Lying Prone - 5-7 x daily - 7 x weekly - 5-10 minutes hold  Prone Gluteal Sets - 3 x daily - 7 x weekly - 2 sets - 10 reps - 5 seconds hold  Prone Knee Flexion - 3 x daily - 7 x weekly - 2 sets - 20 reps  Prone Heel Squeeze - 3 x daily - 7 x weekly - 2 sets - 10 reps - 5 seconds hold      Plan: [x] Continue current frequency toward long and short term goals.     [x] Specific Instructions for subsequent treatments: progress spinal stab ex per Pt tolerance, consider pelvic traction       Time In:1404           Time Out: 0858    Electronically signed by:  Lesly Leary PT

## 2022-09-29 ENCOUNTER — HOSPITAL ENCOUNTER (OUTPATIENT)
Dept: PHYSICAL THERAPY | Age: 52
Setting detail: THERAPIES SERIES
Discharge: HOME | End: 2022-09-29
Payer: COMMERCIAL

## 2022-09-29 PROCEDURE — 97110 THERAPEUTIC EXERCISES: CPT

## 2022-09-29 PROCEDURE — 97012 MECHANICAL TRACTION THERAPY: CPT

## 2022-09-29 NOTE — FLOWSHEET NOTE
[x] [facility]  [address]  [phone]  F: [phone]         Physical Therapy Daily Treatment Note    Date:  2022  Patient Name:  Obi Penn    :  1970  MRN: [id number]  Physician: Adrian Urbina MD                      Insurance: Worker's Comp, (Auth for 8 tx)  Medical Diagnosis:  LB muscle strain ICD-10: S39.012A        Rehab Codes: R 53.82, R 26.89, M 54.59  Onset Date: 22                 Next Dr. Ambrose Moritz: unknown, MRI 10/3    Visit# / total visits: ; Recheck goals due at visit 8     Cancels/No Shows: 0/0 (since  Re-Eval)    Subjective:    Pain:  [x] Yes  [] No  Location: LB  Pain Rating: (0-10 scale) 7/10  Pain altered Tx:  [x] No  [] Yes  Action:  Comments: Pt reports saw [facility]  yesterday, is to follow up w/spine Dr and pain mgmt Dr. Aleah Weeks Pt has MRI scheduled 10/3.       Objective:  Modalities: HP (large) LB, seated in chair, at end of Rx  Trial pelvic traction intermittent, 80 lbs max, 20 lbs min, 30 sec on, 10 sec off, 15° angle of pull, 10 min  Precautions:  Exercises:  Exercise Reps/ Time Weight/ Level Comments   Sci-fit 8 min ML3          Standing    cues for abdom janet    Calf stretch  3x 30sec    HR  20x     HS curls  15x           Seated       Heel/toe raises  20x2     Add sets  10x2 5 sec  Ball, Progressed reps and hold time    Abdom janet  10x 5 sec Progressed hold time    HS curls  15x lime    Scap Retractions  10x2 5 sec Progressed reps and hold time          Prone    Held prone ex per Pt request, preferred to do supine this date    JEFF      Glute set  5sec    Prone heel ball squeeze  5sec    Alt Hamstring curls            Supine       Abdom janet 10x 5sec Progressed hold time    Glut set  10x 5sec    Add sets  10x 5sec    Heel slides  --  W/orange slide tube,  increasing pain         Manual Hold   STM w/Hypervolt to B LB, buttock, in prone, low pressure, trial 9/15, Pt c/o pain L LB, reports increasing numbness         Other: Alternate b/t supine and prone ex based on Pt preferred position each day. Treatment Charges: Mins Units Time In/Out   [x]  Modalities-HP 15  --  8622-7665   [x]  Ther Exercise 43 3 6827-1446   []  Neuromuscular Re-ed      []  Gait Training      []  Manual Therapy      []  Ther Activities      []  Aquatics      []  Vasocompression      [x]  Pelvic Traction 11 1 4343-6812   []  Other      Total Treatment time 54 min     Scifit [id number]      Assessment: [x] Progressing toward goals. Pt able to tolerate increased hold time and increased reps seated ex this date. Pt requests cont supine ex this date. Cont HP at end of Rx to decrease pain and tightness. [x] No change. Pt nonverbals consistent w/pain and difficulty with exercises. [x] Other: Trial pelvic traction, at approx 32% body weight, in supine, Pt complains of lightheadedness, dizziness, w/first pull of traction, after 8 min begins getting headache, reports pain increases each time traction releases. Pt required assistance s/sup-sit after traction. Pt reports pain increased to 8.5/10 after traction, along w/headache continuing. [x] Patient would continue to benefit from skilled physical therapy services in order to: decrease LBP, increase LE strength, increase lumbar ROM, increase tolerance to standing, sitting up straight, moving, walking and bending, and allow Pt to return to work. STG: (to be met in 8 treatments)  ? Pain: LBP 5/10 average pain, 7/10 at worst   ? ROM: Lumbar flex 40°, ext 15°  ? Strength: B hips 3+/5, R knee 3+/5, R DF 4-/5, spinal stab muscles as evidenced by ability to perform beginning spinal stab ex program  ? Function: Oswestry 40% loss of function  Patient to be independent with home exercise program as demonstrated by performance with correct form without cues.        Pt. Education:  [x] Yes  [] No  [] Reviewed Prior HEP/Ed  Method of Education: [x] Verbal  [x] Franco  [] Written  Comprehension of Education:  [x] Verbalizes understanding-spinal anatomy, biomechanics of bulging discs and degenerative disc disease. [] Demonstrates understanding. [] Needs review. [] Demonstrates/verbalizes HEP/Ed previously given. 9/22 HEP-Access Code: [email or web address]  URL: [email or web address]  Lying Prone - 5-7 x daily - 7 x weekly - 5-10 minutes hold  Prone Gluteal Sets - 3 x daily - 7 x weekly - 2 sets - 10 reps - 5 seconds hold  Prone Knee Flexion - 3 x daily - 7 x weekly - 2 sets - 20 reps  Prone Heel Squeeze - 3 x daily - 7 x weekly - 2 sets - 10 reps - 5 seconds hold      Plan: [x] Continue current frequency toward long and short term goals.     [x] Specific Instructions for subsequent treatments: progress spinal stab ex per Pt tolerance; check goals for discharge-Pt w/1 Rx remaining on WC auth      Time In:1355           Time Out: 8698    Electronically signed by:  Logan Cotter PT

## 2022-10-03 ENCOUNTER — HOSPITAL ENCOUNTER (OUTPATIENT)
Dept: MRI IMAGING | Facility: CLINIC | Age: 52
Discharge: HOME | End: 2022-10-05
Payer: COMMERCIAL

## 2022-10-03 DIAGNOSIS — R42 DIZZINESS: ICD-10-CM

## 2022-10-03 LAB — POC CREATININE: 1.6 MG/DL (ref 0.6–1.4)

## 2022-10-03 PROCEDURE — 70553 MRI BRAIN STEM W/O & W/DYE: CPT

## 2022-10-03 PROCEDURE — 6360000004 HC RX CONTRAST MEDICATION: Performed by: PSYCHIATRY & NEUROLOGY

## 2022-10-03 PROCEDURE — 82565 ASSAY OF CREATININE: CPT

## 2022-10-03 PROCEDURE — A9579 GAD-BASE MR CONTRAST NOS,1ML: HCPCS | Performed by: PSYCHIATRY & NEUROLOGY

## 2022-10-03 RX ADMIN — GADOTERIDOL 20 ML: 279.3 INJECTION, SOLUTION INTRAVENOUS at 13:30

## 2022-10-04 ENCOUNTER — HOSPITAL ENCOUNTER (OUTPATIENT)
Dept: PHYSICAL THERAPY | Age: 52
Setting detail: THERAPIES SERIES
Discharge: HOME | End: 2022-10-04
Payer: COMMERCIAL

## 2022-10-04 PROCEDURE — 97110 THERAPEUTIC EXERCISES: CPT

## 2022-10-04 NOTE — THERAPY DISCHARGE
[x] [facility]  [address]  [phone]  F: [phone]         Physical Therapy Discharge Note    Date: 10/4/2022      Patient: Augustine Ramirez  : 1970  MRN: [id number]    Physician: Houston Murdock MD                      Insurance: Worker's Comp, (Auth for 8 tx)  Medical Diagnosis:  LB muscle strain ICD-10: S39.012A        Rehab Codes: R 53.82, R 26.89, M 54.59  Onset Date: 22                 Next Dr. Gomez Sham: unknown, MRI 10/3     Total visits attended: 8  Cancels/No shows: 0/0  Date of initial visit: 2022                Date of final visit: 10/4/2022      Subjective:  Pain:  [x] Yes  [] No               Location: LB    Pain Rating: (0-10 scale) 7.5/10  Pain altered Tx:  [x] No  [] Yes  Action:  Comments: Pt reports his back cont to be sore today. Recent pain up to 8.5/10, takes tylenol for pain. Pt reports R leg cont to feel weak. Pt cont to complain of dizziness. Attempted mechanical pelvic traction 1x, Pt complained of lightheadedness, dizziness and increased pain after. Objective:  Test Measurements:   10/4 ROM DEGREES A/P STRENGTH  STRENGTH        RIGHT Left   HIP FLEX   3+p 4   HIPEXT         HIP ABD                   KNEE FLEX   3+ 4   KNEE EXT   3+ 4+             ANKLE DF   3+ 4+             Lumbar Flex 12°*       Ext *       *unable to accurately assess lumbar ROM due to pt dizziness and he is afraid will fall. Function: Oswestry 56% loss of function. Assessment:  STG: (to be met in 8 treatments)  ? Pain: LBP 5/10 average pain, 7/10 at worst   ? ROM: Lumbar flex 40°, ext 15°  ? Strength: B hips 3+/5, R knee 3+/5, R DF 4-/5, spinal stab muscles as evidenced by ability to perform beginning spinal stab ex program  ? Function: Oswestry 40% loss of function  Patient to be independent with home exercise program as demonstrated by performance with correct form without cues.       Treatment to Date:  [x] Therapeutic Exercise    [x] Modalities:  [] Therapeutic Activity    [] Ultrasound  [] Electrical Stimulation  [] Gait Training     [] Massage       [x] Lumbar/Cervical Traction  [] Neuromuscular Re-education [x] Cold/hotpack [] Iontophoresis: 4 mg/mL  [x] Instruction in Home Exercise Program                     Dexamethasone Sodium  [] Manual Therapy             Phosphate 40-80 mAmin  [] Aquatic Therapy                   [] Vasocompression/    [] Other:             Game Ready    Discharge Status:     [] Pt recovered from conditions. Treatment goals were met. [x] Pt received maximum benefit. No further therapy indicated at this time. [x] Pt to continue exercise/home instructions independently. [] Therapy interrupted due to:    [] Pt has 2 or more no shows/cancels, is discontinued per our policy. [x] Pt has completed prescribed number of treatment sessions. [] Other:         Electronically signed by Paul Anderson PT on 10/4/2022 at 2:51 PM        If you have any questions or concerns, please don't hesitate to call.   Thank you for your referral.

## 2022-10-04 NOTE — FLOWSHEET NOTE
[x] [facility] [facility]  [address]  [phone]  F: [phone]         Physical Therapy Daily Treatment Note    Date:  10/4/2022  Patient Name:  Manuel De La Rosa    :  1970  MRN: [id number]  Physician: Jenny Jordan MD                      Insurance: Worker's Comp, (Auth for 8 tx)  Medical Diagnosis:  LB muscle strain ICD-10: S39.012A        Rehab Codes: R 53.82, R 26.89, M 54.59  Onset Date: 22                 Next Dr. Erwin Green: unknown, MRI 10/3    Visit# / total visits: ; Recheck goals due at visit 8     Cancels/No Shows: 0/0 (since  Re-Eval)    Subjective:    Pain:  [x] Yes  [] No  Location: LB  Pain Rating: (0-10 scale) 7.5/10  Pain altered Tx:  [x] No  [] Yes  Action:  Comments: Pt reports his back is sore today, he had MRI yesterday. His pain was at 8/10, took tylenol so is now down to 7.5/10. Pt reports R leg cont to feel weak.     Objective:  Modalities: HP (large) LB, seated in chair, at end of Rx  Trial pelvic traction intermittent, 80 lbs max, 20 lbs min, 30 sec on, 10 sec off, 15° angle of pull, 10 min  Precautions:  Exercises:  Exercise Reps/ Time Weight/ Level Comments   Sci-fit 8 min ML3          Standing    cues for abdom janet    Calf stretch  3x 30sec    HR  20x     HS curls  15x           Seated       Heel/toe raises  20x2     Add sets  10x 5 sec  Ball     Abdom janet  10x 5 sec    HS curls  15x lime    Scap Retractions  10x2 5 sec          Prone    Held prone ex per Pt request, preferred to do supine this date    JEFF      Glute set  5sec    Prone heel ball squeeze  5sec    Alt Hamstring curls            Supine       Abdom janet 10x 5sec    Glut set  10x 5sec    Add sets  10x 5sec    Heel slides  --  W/orange slide tube,  increasing pain         Manual Hold   STM w/Hypervolt to B LB, buttock, in prone, low pressure, trial 9/15, Pt c/o pain L LB, reports increasing numbness         Other: Alternate b/t supine and prone ex based on Pt preferred position each day. 10/4 ROM DEGREES A/P STRENGTH  STRENGTH      RIGHT Left   HIP FLEX  3+p 4   HIPEXT      HIP ABD            KNEE FLEX  3+ 4   KNEE EXT  3+ 4+         ANKLE DF  3+ 4+         Lumbar Flex 12°*     Ext *     *unable to accurately assess lumbar ROM due to pt dizziness and he is afraid will fall. Treatment Charges: Mins Units Time In/Out   [x]  Modalities-HP 15  --  [id number]   [x]  Ther Exercise 44 4 [phone]   []  Neuromuscular Re-ed      []  Gait Training      []  Manual Therapy      []  Ther Activities      []  Aquatics      []  Vasocompression      [x]  Pelvic Traction -- --    []  Other      Total Treatment time 44 min     Scifit [id number]      Assessment: [x] Progressing toward goals. Educated Pt in sitting and supine ex for HEP, issued handouts. Pt demonstrates progress in R LE strength. Pt able to perform all ex correctly w/cues for recall. Pt at end of [facility] auth, will discharge to [facility]. [x] No change. Pt nonverbals consistent w/pain and difficulty with exercises. No change in lumbar ROM or L LE strength. Oswestry 56% loss of function. [x] Other: Pt reports feeling like he is on a ship during his standing ex, like his balance is off, states this is how he normally feels before he gets dizzy. [x] Patient would continue to benefit from skilled physical therapy services in order to: decrease LBP, increase LE strength, increase lumbar ROM, increase tolerance to standing, sitting up straight, moving, walking and bending, and allow Pt to return to work. STG: (to be met in 8 treatments)  ? Pain: LBP 5/10 average pain, 7/10 at worst-No Change   ? ROM: Lumbar flex 40°, ext 15°-No Change   ? Strength: B hips 3+/5, R knee 3+/5, R DF 4-/5, spinal stab muscles as evidenced by ability to perform beginning spinal stab ex program-Partially Met  ?  Function: Oswestry 40% loss of function-No Change   Patient to be independent with home exercise program as demonstrated by performance with correct form without cues-Met       Pt. Education:  [x] Yes  [] No  [] Reviewed Prior HEP/Ed  Method of Education: [x] Verbal  [x] Demo  [x] Written  Comprehension of Education:  [x] Verbalizes understanding  [x] Demonstrates understanding. [] Needs review. [] Demonstrates/verbalizes HEP/Ed previously given. 9/22 HEP-Access Code: [email or web address]  URL: [employer]/  Lying Prone - 5-7 x daily - 7 x weekly - 5-10 minutes hold  Prone Gluteal Sets - 3 x daily - 7 x weekly - 2 sets - 10 reps - 5 seconds hold  Prone Knee Flexion - 3 x daily - 7 x weekly - 2 sets - 20 reps  Prone Heel Squeeze - 3 x daily - 7 x weekly - 2 sets - 10 reps - 5 seconds hold    10/4 HEP-Access Code: [email or web address]  URL: [employer]/  Seated Scapular Retraction - 3-5 x daily - 7 x weekly - 10 reps - 5 seconds hold  Seated Heel Toe Raises - 2-3 x daily - 7 x weekly - 2-3 sets - 20 reps  Seated Transversus Abdominis Bracing - 2-3 x daily - 7 x weekly - 10 reps - 5 seconds hold  Seated Gluteal Sets - 2-3 x daily - 7 x weekly - 10 reps - 5 seconds hold  Seated Hip Adduction Isometrics with Ball - 2-3 x daily - 7 x weekly - 10 reps - 5 sec hold  Supine Hip Adduction Isometric with Ball - 2 x daily - 7 x weekly - 10 reps - 5 sec hold  Hooklying Gluteal Sets - 2 x daily - 7 x weekly - 10 reps - 5 seconds hold  Supine Transversus Abdominis Bracing - Hands on Stomach - 2 x daily - 7 x weekly - 10 reps - 5seconds hold        Plan: [] Continue current frequency toward long and short term goals.     [x] Specific Instructions for subsequent treatments: discharge to [facility]      Time In:1400          Time Out: 1510    Electronically signed by:  Nael De La Paz, PT

## 2023-05-08 ENCOUNTER — HOSPITAL ENCOUNTER (OUTPATIENT)
Age: 53
Setting detail: OBSERVATION
Discharge: HOME | End: 2023-05-10
Attending: EMERGENCY MEDICINE | Admitting: STUDENT IN AN ORGANIZED HEALTH CARE EDUCATION/TRAINING PROGRAM
Payer: COMMERCIAL

## 2023-05-08 ENCOUNTER — APPOINTMENT (OUTPATIENT)
Dept: GENERAL RADIOLOGY | Age: 53
End: 2023-05-08
Payer: COMMERCIAL

## 2023-05-08 DIAGNOSIS — R07.9 CHEST PAIN, UNSPECIFIED TYPE: Primary | ICD-10-CM

## 2023-05-08 PROBLEM — I20.89 STABLE ANGINA: Status: ACTIVE | Noted: 2023-05-08

## 2023-05-08 PROBLEM — G47.33 OSA (OBSTRUCTIVE SLEEP APNEA): Status: ACTIVE | Noted: 2023-05-08

## 2023-05-08 PROBLEM — E66.09 CLASS 2 OBESITY DUE TO EXCESS CALORIES IN ADULT: Chronic | Status: ACTIVE | Noted: 2023-05-08

## 2023-05-08 PROBLEM — I20.8 STABLE ANGINA (HCC): Status: ACTIVE | Noted: 2023-05-08

## 2023-05-08 LAB
ABSOLUTE EOS #: 0.34 K/UL (ref 0–0.44)
ABSOLUTE IMMATURE GRANULOCYTE: 0.04 K/UL (ref 0–0.3)
ABSOLUTE LYMPH #: 1.73 K/UL (ref 1.1–3.7)
ABSOLUTE MONO #: 0.69 K/UL (ref 0.1–1.2)
ALBUMIN SERPL-MCNC: 3.8 G/DL (ref 3.5–5.2)
ALP SERPL-CCNC: 120 U/L (ref 40–129)
ALT SERPL-CCNC: 12 U/L (ref 5–41)
ANION GAP SERPL CALCULATED.3IONS-SCNC: 12 MMOL/L (ref 9–17)
AST SERPL-CCNC: 7 U/L
BASOPHILS # BLD: 1 % (ref 0–2)
BASOPHILS ABSOLUTE: 0.08 K/UL (ref 0–0.2)
BETA-HYDROXYBUTYRATE: 0.17 MMOL/L (ref 0.02–0.27)
BILIRUB SERPL-MCNC: 0.7 MG/DL (ref 0.3–1.2)
BUN SERPL-MCNC: 15 MG/DL (ref 6–20)
BUN/CREAT BLD: 10 (ref 9–20)
CALCIUM SERPL-MCNC: 9 MG/DL (ref 8.6–10.4)
CHLORIDE SERPL-SCNC: 98 MMOL/L (ref 98–107)
CHP ED QC CHECK: 292
CO2 SERPL-SCNC: 23 MMOL/L (ref 20–31)
CREAT SERPL-MCNC: 1.48 MG/DL (ref 0.7–1.2)
EOSINOPHILS RELATIVE PERCENT: 4 % (ref 1–4)
GFR SERPL CREATININE-BSD FRML MDRD: 57 ML/MIN/1.73M2
GLUCOSE BLD-MCNC: 290 MG/DL (ref 75–110)
GLUCOSE BLD-MCNC: 292 MG/DL (ref 75–110)
GLUCOSE BLD-MCNC: 317 MG/DL (ref 75–110)
GLUCOSE SERPL-MCNC: 467 MG/DL (ref 70–99)
HCT VFR BLD AUTO: 47.1 % (ref 40.7–50.3)
HGB BLD-MCNC: 16.2 G/DL (ref 13–17)
IMMATURE GRANULOCYTES: 0 %
LIPASE SERPL-CCNC: 30 U/L (ref 13–60)
LV EF: 55 %
LVEF MODALITY: NORMAL
LYMPHOCYTES # BLD: 18 % (ref 24–43)
MCH RBC QN AUTO: 28.2 PG (ref 25.2–33.5)
MCHC RBC AUTO-ENTMCNC: 34.4 G/DL (ref 28.4–34.8)
MCV RBC AUTO: 82.1 FL (ref 82.6–102.9)
MONOCYTES # BLD: 7 % (ref 3–12)
MYOGLOBIN SERPL-MCNC: 67 NG/ML (ref 28–72)
MYOGLOBIN SERPL-MCNC: 73 NG/ML (ref 28–72)
MYOGLOBIN SERPL-MCNC: 79 NG/ML (ref 28–72)
NRBC AUTOMATED: 0 PER 100 WBC
PDW BLD-RTO: 13.1 % (ref 11.8–14.4)
PLATELET # BLD AUTO: 174 K/UL (ref 138–453)
PMV BLD AUTO: 9.9 FL (ref 8.1–13.5)
POTASSIUM SERPL-SCNC: 4 MMOL/L (ref 3.7–5.3)
PROT SERPL-MCNC: 6.9 G/DL (ref 6.4–8.3)
RBC # BLD: 5.74 M/UL (ref 4.21–5.77)
RBC # BLD: ABNORMAL 10*6/UL
SEG NEUTROPHILS: 70 % (ref 36–65)
SEGMENTED NEUTROPHILS ABSOLUTE COUNT: 6.74 K/UL (ref 1.5–8.1)
SODIUM SERPL-SCNC: 133 MMOL/L (ref 135–144)
TROPONIN I SERPL DL<=0.01 NG/ML-MCNC: 16 NG/L (ref 0–22)
TROPONIN I SERPL DL<=0.01 NG/ML-MCNC: 17 NG/L (ref 0–22)
TROPONIN I SERPL DL<=0.01 NG/ML-MCNC: 20 NG/L (ref 0–22)
WBC # BLD AUTO: 9.6 K/UL (ref 3.5–11.3)

## 2023-05-08 PROCEDURE — 80053 COMPREHEN METABOLIC PANEL: CPT

## 2023-05-08 PROCEDURE — 83874 ASSAY OF MYOGLOBIN: CPT

## 2023-05-08 PROCEDURE — 82010 KETONE BODYS QUAN: CPT

## 2023-05-08 PROCEDURE — 93005 ELECTROCARDIOGRAM TRACING: CPT | Performed by: PHYSICIAN ASSISTANT

## 2023-05-08 PROCEDURE — 6370000000 HC RX 637 (ALT 250 FOR IP): Performed by: PHYSICIAN ASSISTANT

## 2023-05-08 PROCEDURE — 6370000000 HC RX 637 (ALT 250 FOR IP): Performed by: NURSE PRACTITIONER

## 2023-05-08 PROCEDURE — 82947 ASSAY GLUCOSE BLOOD QUANT: CPT

## 2023-05-08 PROCEDURE — 2580000003 HC RX 258: Performed by: PHYSICIAN ASSISTANT

## 2023-05-08 PROCEDURE — 36415 COLL VENOUS BLD VENIPUNCTURE: CPT

## 2023-05-08 PROCEDURE — G0378 HOSPITAL OBSERVATION PER HR: HCPCS

## 2023-05-08 PROCEDURE — 96372 THER/PROPH/DIAG INJ SC/IM: CPT

## 2023-05-08 PROCEDURE — 6360000002 HC RX W HCPCS: Performed by: PHYSICIAN ASSISTANT

## 2023-05-08 PROCEDURE — 83690 ASSAY OF LIPASE: CPT

## 2023-05-08 PROCEDURE — 99223 1ST HOSP IP/OBS HIGH 75: CPT | Performed by: NURSE PRACTITIONER

## 2023-05-08 PROCEDURE — 84484 ASSAY OF TROPONIN QUANT: CPT

## 2023-05-08 PROCEDURE — 71045 X-RAY EXAM CHEST 1 VIEW: CPT

## 2023-05-08 PROCEDURE — 85025 COMPLETE CBC W/AUTO DIFF WBC: CPT

## 2023-05-08 PROCEDURE — 96374 THER/PROPH/DIAG INJ IV PUSH: CPT

## 2023-05-08 PROCEDURE — 2500000003 HC RX 250 WO HCPCS: Performed by: PHYSICIAN ASSISTANT

## 2023-05-08 PROCEDURE — 99285 EMERGENCY DEPT VISIT HI MDM: CPT

## 2023-05-08 PROCEDURE — 96375 TX/PRO/DX INJ NEW DRUG ADDON: CPT

## 2023-05-08 PROCEDURE — 93306 TTE W/DOPPLER COMPLETE: CPT

## 2023-05-08 PROCEDURE — 2580000003 HC RX 258: Performed by: NURSE PRACTITIONER

## 2023-05-08 PROCEDURE — A4216 STERILE WATER/SALINE, 10 ML: HCPCS | Performed by: PHYSICIAN ASSISTANT

## 2023-05-08 PROCEDURE — 6360000002 HC RX W HCPCS: Performed by: NURSE PRACTITIONER

## 2023-05-08 RX ORDER — ALLOPURINOL 100 MG/1
100 TABLET ORAL DAILY
Status: DISCONTINUED | OUTPATIENT
Start: 2023-05-08 | End: 2023-05-10 | Stop reason: HOSPADM

## 2023-05-08 RX ORDER — SODIUM CHLORIDE 9 MG/ML
INJECTION, SOLUTION INTRAVENOUS CONTINUOUS
Status: DISCONTINUED | OUTPATIENT
Start: 2023-05-08 | End: 2023-05-09

## 2023-05-08 RX ORDER — NITROGLYCERIN 0.4 MG/1
0.4 TABLET SUBLINGUAL EVERY 5 MIN PRN
Status: DISCONTINUED | OUTPATIENT
Start: 2023-05-08 | End: 2023-05-10 | Stop reason: HOSPADM

## 2023-05-08 RX ORDER — ROSUVASTATIN CALCIUM 10 MG/1
20 TABLET, COATED ORAL DAILY
Status: DISCONTINUED | OUTPATIENT
Start: 2023-05-08 | End: 2023-05-10 | Stop reason: HOSPADM

## 2023-05-08 RX ORDER — SODIUM CHLORIDE 0.9 % (FLUSH) 0.9 %
5-40 SYRINGE (ML) INJECTION EVERY 12 HOURS SCHEDULED
Status: DISCONTINUED | OUTPATIENT
Start: 2023-05-08 | End: 2023-05-10 | Stop reason: HOSPADM

## 2023-05-08 RX ORDER — NITROGLYCERIN 0.4 MG/1
0.4 TABLET SUBLINGUAL EVERY 5 MIN PRN
Status: ACTIVE | OUTPATIENT
Start: 2023-05-08 | End: 2023-05-08

## 2023-05-08 RX ORDER — MORPHINE SULFATE 4 MG/ML
4 INJECTION, SOLUTION INTRAMUSCULAR; INTRAVENOUS ONCE
Status: COMPLETED | OUTPATIENT
Start: 2023-05-08 | End: 2023-05-08

## 2023-05-08 RX ORDER — POTASSIUM CHLORIDE 7.45 MG/ML
10 INJECTION INTRAVENOUS PRN
Status: DISCONTINUED | OUTPATIENT
Start: 2023-05-08 | End: 2023-05-10 | Stop reason: HOSPADM

## 2023-05-08 RX ORDER — ONDANSETRON 2 MG/ML
4 INJECTION INTRAMUSCULAR; INTRAVENOUS EVERY 6 HOURS PRN
Status: DISCONTINUED | OUTPATIENT
Start: 2023-05-08 | End: 2023-05-10 | Stop reason: HOSPADM

## 2023-05-08 RX ORDER — AMLODIPINE BESYLATE 5 MG/1
5 TABLET ORAL DAILY
Status: DISCONTINUED | OUTPATIENT
Start: 2023-05-08 | End: 2023-05-09

## 2023-05-08 RX ORDER — SODIUM CHLORIDE 0.9 % (FLUSH) 0.9 %
10 SYRINGE (ML) INJECTION PRN
Status: DISCONTINUED | OUTPATIENT
Start: 2023-05-08 | End: 2023-05-10 | Stop reason: HOSPADM

## 2023-05-08 RX ORDER — ASPIRIN 81 MG/1
81 TABLET ORAL DAILY
Status: DISCONTINUED | OUTPATIENT
Start: 2023-05-08 | End: 2023-05-10 | Stop reason: HOSPADM

## 2023-05-08 RX ORDER — POTASSIUM CHLORIDE 20 MEQ/1
40 TABLET, EXTENDED RELEASE ORAL PRN
Status: DISCONTINUED | OUTPATIENT
Start: 2023-05-08 | End: 2023-05-10 | Stop reason: HOSPADM

## 2023-05-08 RX ORDER — METOPROLOL TARTRATE 5 MG/5ML
5 INJECTION INTRAVENOUS EVERY 5 MIN PRN
Status: ACTIVE | OUTPATIENT
Start: 2023-05-08 | End: 2023-05-08

## 2023-05-08 RX ORDER — SODIUM CHLORIDE 0.9 % (FLUSH) 0.9 %
5-40 SYRINGE (ML) INJECTION PRN
Status: ACTIVE | OUTPATIENT
Start: 2023-05-08 | End: 2023-05-08

## 2023-05-08 RX ORDER — ATROPINE SULFATE 0.1 MG/ML
0.5 INJECTION INTRAVENOUS EVERY 5 MIN PRN
Status: ACTIVE | OUTPATIENT
Start: 2023-05-08 | End: 2023-05-08

## 2023-05-08 RX ORDER — CARVEDILOL 6.25 MG/1
6.25 TABLET ORAL DAILY
Status: DISCONTINUED | OUTPATIENT
Start: 2023-05-08 | End: 2023-05-09

## 2023-05-08 RX ORDER — SODIUM CHLORIDE 9 MG/ML
500 INJECTION, SOLUTION INTRAVENOUS CONTINUOUS PRN
Status: ACTIVE | OUTPATIENT
Start: 2023-05-08 | End: 2023-05-08

## 2023-05-08 RX ORDER — SODIUM CHLORIDE 9 MG/ML
INJECTION, SOLUTION INTRAVENOUS PRN
Status: DISCONTINUED | OUTPATIENT
Start: 2023-05-08 | End: 2023-05-10 | Stop reason: HOSPADM

## 2023-05-08 RX ORDER — MAGNESIUM SULFATE 1 G/100ML
1000 INJECTION INTRAVENOUS PRN
Status: DISCONTINUED | OUTPATIENT
Start: 2023-05-08 | End: 2023-05-10 | Stop reason: HOSPADM

## 2023-05-08 RX ORDER — ONDANSETRON 4 MG/1
4 TABLET, ORALLY DISINTEGRATING ORAL EVERY 8 HOURS PRN
Status: DISCONTINUED | OUTPATIENT
Start: 2023-05-08 | End: 2023-05-10 | Stop reason: HOSPADM

## 2023-05-08 RX ORDER — INSULIN LISPRO 100 [IU]/ML
0-8 INJECTION, SOLUTION INTRAVENOUS; SUBCUTANEOUS
Status: DISCONTINUED | OUTPATIENT
Start: 2023-05-08 | End: 2023-05-10 | Stop reason: HOSPADM

## 2023-05-08 RX ORDER — 0.9 % SODIUM CHLORIDE 0.9 %
500 INTRAVENOUS SOLUTION INTRAVENOUS ONCE
Status: COMPLETED | OUTPATIENT
Start: 2023-05-08 | End: 2023-05-08

## 2023-05-08 RX ORDER — ALBUTEROL SULFATE 90 UG/1
2 AEROSOL, METERED RESPIRATORY (INHALATION) PRN
Status: ACTIVE | OUTPATIENT
Start: 2023-05-08 | End: 2023-05-08

## 2023-05-08 RX ORDER — INSULIN LISPRO 100 [IU]/ML
0-4 INJECTION, SOLUTION INTRAVENOUS; SUBCUTANEOUS NIGHTLY
Status: DISCONTINUED | OUTPATIENT
Start: 2023-05-08 | End: 2023-05-10 | Stop reason: HOSPADM

## 2023-05-08 RX ORDER — ENOXAPARIN SODIUM 100 MG/ML
30 INJECTION SUBCUTANEOUS 2 TIMES DAILY
Status: DISCONTINUED | OUTPATIENT
Start: 2023-05-08 | End: 2023-05-10 | Stop reason: HOSPADM

## 2023-05-08 RX ORDER — DEXTROSE MONOHYDRATE 100 MG/ML
INJECTION, SOLUTION INTRAVENOUS CONTINUOUS PRN
Status: DISCONTINUED | OUTPATIENT
Start: 2023-05-08 | End: 2023-05-10 | Stop reason: HOSPADM

## 2023-05-08 RX ORDER — ONDANSETRON 2 MG/ML
4 INJECTION INTRAMUSCULAR; INTRAVENOUS ONCE
Status: COMPLETED | OUTPATIENT
Start: 2023-05-08 | End: 2023-05-08

## 2023-05-08 RX ADMIN — FAMOTIDINE 20 MG: 10 INJECTION, SOLUTION INTRAVENOUS at 13:41

## 2023-05-08 RX ADMIN — SODIUM CHLORIDE: 9 INJECTION, SOLUTION INTRAVENOUS at 16:36

## 2023-05-08 RX ADMIN — ONDANSETRON 4 MG: 2 INJECTION INTRAMUSCULAR; INTRAVENOUS at 14:37

## 2023-05-08 RX ADMIN — SODIUM CHLORIDE 500 ML: 9 INJECTION, SOLUTION INTRAVENOUS at 11:45

## 2023-05-08 RX ADMIN — INSULIN LISPRO 6 UNITS: 100 INJECTION, SOLUTION INTRAVENOUS; SUBCUTANEOUS at 17:46

## 2023-05-08 RX ADMIN — ALUMINUM HYDROXIDE, MAGNESIUM HYDROXIDE, AND SIMETHICONE: 200; 200; 20 SUSPENSION ORAL at 13:31

## 2023-05-08 RX ADMIN — INSULIN HUMAN 10 UNITS: 100 INJECTION, SOLUTION PARENTERAL at 11:39

## 2023-05-08 RX ADMIN — MORPHINE SULFATE 4 MG: 4 INJECTION, SOLUTION INTRAMUSCULAR; INTRAVENOUS at 14:36

## 2023-05-08 RX ADMIN — ENOXAPARIN SODIUM 30 MG: 100 INJECTION SUBCUTANEOUS at 17:49

## 2023-05-08 ASSESSMENT — PAIN - FUNCTIONAL ASSESSMENT: PAIN_FUNCTIONAL_ASSESSMENT: 0-10

## 2023-05-08 ASSESSMENT — PAIN DESCRIPTION - LOCATION
LOCATION: CHEST

## 2023-05-08 ASSESSMENT — HEART SCORE
ECG: 0
ECG: 1

## 2023-05-08 ASSESSMENT — PAIN SCALES - GENERAL
PAINLEVEL_OUTOF10: 5
PAINLEVEL_OUTOF10: 5
PAINLEVEL_OUTOF10: 6
PAINLEVEL_OUTOF10: 5

## 2023-05-08 ASSESSMENT — PAIN DESCRIPTION - DESCRIPTORS
DESCRIPTORS: PRESSURE
DESCRIPTORS: TIGHTNESS;PRESSURE
DESCRIPTORS: PRESSURE
DESCRIPTORS: PRESSURE

## 2023-05-08 ASSESSMENT — PAIN DESCRIPTION - ORIENTATION
ORIENTATION: MID

## 2023-05-08 ASSESSMENT — ENCOUNTER SYMPTOMS
SHORTNESS OF BREATH: 1
NAUSEA: 1

## 2023-05-08 ASSESSMENT — PAIN DESCRIPTION - PAIN TYPE: TYPE: OTHER (COMMENT)

## 2023-05-08 ASSESSMENT — PAIN DESCRIPTION - ONSET: ONSET: ON-GOING

## 2023-05-08 ASSESSMENT — PAIN DESCRIPTION - FREQUENCY: FREQUENCY: CONTINUOUS

## 2023-05-08 NOTE — H&P
[facility]  Office: [address], DO, Juan Carlos Yo, DO, Jayne Mckenna, DO, Buzz Sandoval Blood, DO, Abhijit Khan MD, Miguel Ángel Jensen MD, Muna Parekh MD, Saurabh Portillo MD,  Shanelle Driver MD, Bettye Sargent MD, Mushtaq Borges, DO, Manoj Kwok MD,  Ramirez Huynh MD, Tessa Pang MD, Lorin Alaniz, DO, Irene Urban MD, Chela Llanos MD, Leon Greene DO, Niecy Doyle MD, Johnetta Aschoff, MD, Maggie Fuchs MD, Garrett Mitchell MD,  James Dodd DO, Sherry Dean MD,  Esteban Sow, CNP,  Karyna Miranda CNP, Oscar Carter, CNP, Aleksey Jean, CNP,  Christina Eisenmenger, [facility], Nadine Harmon, CNP, Jermaine Pantoja, CNP, Shwetha Neville, CNP, Mustapha Smith, CNP, Jose Max, CNP, Javier Robins PA-C, Sofia Singh, CNS, Gordon Mcallister, CNP, Barrie Lopes, [facility]    HISTORY AND PHYSICAL EXAMINATION            Date:   5/8/2023  Patient name:  David Martin  Date of admission:  5/8/2023  9:37 AM  MRN:   [id number]  Account:  [de-identified]  YOB: 1970  PCP:    Claudia Arroyo  Room:   2032/2032-01  Code Status:    Full Code    Chief Complaint:     Chief Complaint   Patient presents with    Chest Pain     Pt reports midsternal chest pain since last night. States pain was 9/10 when it woke him up but rates pain 6/10 on arrival. Given 1 nitro, 500mL NS, and 324 ASA by EMS       History Obtained From:     patient, electronic medical record    History of Present Illness:     David Martin is a 47 y.o. Non- / non  male who presents with Chest Pain (Pt reports midsternal chest pain since last night. States pain was 9/10 when it woke him up but rates pain 6/10 on arrival. Given 1 nitro, 500mL NS, and 324 ASA by EMS)   and is admitted to the hospital for the management of [facility] Presented to the ER with complaint of chest pressure.   He states he woke up

## 2023-05-08 NOTE — ED NOTES
Pt brought to room 21 via stretcher by [facility]. Pt reports midsternal chest pain starting last night. States pain woke him from sleep and was originally 9/10. Pt was at work this morning and called 911 because pain wasn't going away. On arrival, pt rated chest pain 6/10. Describes as a pressure on his chest.  EMS gave 324 ASA, 1 nitro, and 500mL NS via 18G left AC. Pt reports pain is now 5/10. Pt reports hx of diabetes. States blood sugar normally runs around 200. EMS reports >400 for them. States he has been off of most of his medications due to insurance problems. States he recently got a new insurance and started on his medications again.        Gay Long RN  05/08/23 [address], RN  05/08/23 8762

## 2023-05-08 NOTE — PROGRESS NOTES
ADMISSION NOTE         Patient admitted to room: 2032     Time of admit: 1600    Admit from: ED     Reason for admission: Stable Angina    Where patient has been residing for the last 24 hrs: personal residence      Family at bedside: No     Patient is currently: resting in bed, calm and cooperative, respirations even and unlabored. Deny SOB. Vitals obtained, telemetry applied. No distress apparent. Patient reports unchanged mild mid chest pressure with no radiation, but state no acute pain. Patient oriented to room, educated on how to use call light, bed controls, and to call for assistance. Bed in lowest and locked position. Two siderails up for safety. Call light within reach. Verbal safety instructions provided and patient verbalizd understanding.

## 2023-05-08 NOTE — ED PROVIDER NOTES
EMERGENCY DEPARTMENT ENCOUNTER   ATTENDING ATTESTATION     Pt Name: Deon Kamara  MRN: [id number]  [place] 1970  Date of evaluation: 5/8/23   Deon Kamara is a 47 y.o. male with CC: Chest Pain (Pt reports midsternal chest pain since last night. States pain was 9/10 when it woke him up but rates pain 6/10 on arrival. Given 1 nitro, 500mL NS, and 324 ASA by EMS)    MDM:   Patient is a 50-year-old who presented to the emergency department secondary to chest pain. KG no STEMI. Patient admitted for further evaluation and treatment. This visit was performed by both a physician and an APC. I personally evaluated and examined the patient. I performed all aspects of the MDM as documented. CRITICAL CARE:       EKG: All EKG's are interpreted by the Emergency Department Physician who either signs or Co-signs this chart in the absence of a cardiologist.      RADIOLOGY:All plain film, CT, MRI, and formal ultrasound images (except ED bedside ultrasound) are read by the radiologist, see reports below, unless otherwise noted in MDM or here. XR CHEST PORTABLE    (Results Pending)     LABS: All lab results were reviewed by myself, and all abnormals are listed below. Labs Reviewed   CBC WITH AUTO DIFFERENTIAL - Abnormal; Notable for the following components:       Result Value    MCV 82.1 (*)     Seg Neutrophils 70 (*)     Lymphocytes 18 (*)     All other components within normal limits   COMPREHENSIVE METABOLIC PANEL   LIPASE   TROP/MYOGLOBIN   TROP/MYOGLOBIN   TROP/MYOGLOBIN   TROP/MYOGLOBIN   TROP/MYOGLOBIN   TROP/MYOGLOBIN   TROP/MYOGLOBIN     CONSULTS:  None  FINAL IMPRESSION    No diagnosis found.         PASTMEDICAL HISTORY     Past Medical History:   Diagnosis Date    Arthritis     GENERALIZED ALL OVER BODY    Diabetes mellitus ([facility] 2006    NIDDM ON RX    Difficult intravenous access     ED (erectile dysfunction)     Gout 2009    ON RX    Hyperlipidemia 2006    ON RX    Hypertension 2006    ON RX
CONSULTS:  IP CONSULT TO HOSPITALIST    PROCEDURES:  Procedures        FINAL IMPRESSION      1. Chest pain, unspecified type          DISPOSITION/PLAN   DISPOSITION Admitted 05/08/2023 02:48:07 PM      PATIENTREFERRED TO:   No follow-up provider specified.     DISCHARGE MEDICATIONS:     Current Discharge Medication List              (Please note that portions of this note were completed with a voice recognition program.  Efforts were made to edit thedictations but occasionally words are mis-transcribed.)    VIKAS Joya PA-C  05/08/23 2157

## 2023-05-09 ENCOUNTER — APPOINTMENT (OUTPATIENT)
Dept: NUCLEAR MEDICINE | Age: 53
End: 2023-05-09
Payer: COMMERCIAL

## 2023-05-09 PROBLEM — I20.0 UNSTABLE ANGINA (HCC): Status: ACTIVE | Noted: 2023-05-08

## 2023-05-09 LAB
ABSOLUTE EOS #: 0.28 K/UL (ref 0–0.44)
ABSOLUTE IMMATURE GRANULOCYTE: 0.02 K/UL (ref 0–0.3)
ABSOLUTE LYMPH #: 1.68 K/UL (ref 1.1–3.7)
ABSOLUTE MONO #: 0.51 K/UL (ref 0.1–1.2)
ANION GAP SERPL CALCULATED.3IONS-SCNC: 6 MMOL/L (ref 9–17)
ANION GAP SERPL CALCULATED.3IONS-SCNC: 8 MMOL/L (ref 9–17)
BACTERIA: ABNORMAL
BASOPHILS # BLD: 1 % (ref 0–2)
BASOPHILS ABSOLUTE: 0.06 K/UL (ref 0–0.2)
BILIRUBIN URINE: NEGATIVE
BUN SERPL-MCNC: 13 MG/DL (ref 6–20)
BUN SERPL-MCNC: 15 MG/DL (ref 6–20)
BUN/CREAT BLD: 10 (ref 9–20)
BUN/CREAT BLD: 11 (ref 9–20)
CALCIUM SERPL-MCNC: 8.2 MG/DL (ref 8.6–10.4)
CALCIUM SERPL-MCNC: 8.4 MG/DL (ref 8.6–10.4)
CHLORIDE SERPL-SCNC: 99 MMOL/L (ref 98–107)
CHLORIDE SERPL-SCNC: 99 MMOL/L (ref 98–107)
CHOLEST SERPL-MCNC: 105 MG/DL
CHOLESTEROL/HDL RATIO: 10.5
CO2 SERPL-SCNC: 26 MMOL/L (ref 20–31)
CO2 SERPL-SCNC: 27 MMOL/L (ref 20–31)
COLOR: YELLOW
CREAT SERPL-MCNC: 1.18 MG/DL (ref 0.7–1.2)
CREAT SERPL-MCNC: 1.51 MG/DL (ref 0.7–1.2)
CREATININE URINE: 120.9 MG/DL (ref 39–259)
EKG ATRIAL RATE: 94 BPM
EKG P AXIS: 49 DEGREES
EKG P-R INTERVAL: 156 MS
EKG Q-T INTERVAL: 358 MS
EKG QRS DURATION: 102 MS
EKG QTC CALCULATION (BAZETT): 447 MS
EKG R AXIS: -66 DEGREES
EKG T AXIS: 41 DEGREES
EKG VENTRICULAR RATE: 94 BPM
EOSINOPHILS RELATIVE PERCENT: 4 % (ref 1–4)
EPITHELIAL CELLS UA: ABNORMAL /HPF (ref 0–5)
EST. AVERAGE GLUCOSE BLD GHB EST-MCNC: 289 MG/DL
GFR SERPL CREATININE-BSD FRML MDRD: 56 ML/MIN/1.73M2
GFR SERPL CREATININE-BSD FRML MDRD: >60 ML/MIN/1.73M2
GLUCOSE BLD-MCNC: 269 MG/DL (ref 75–110)
GLUCOSE BLD-MCNC: 289 MG/DL (ref 75–110)
GLUCOSE BLD-MCNC: 304 MG/DL (ref 75–110)
GLUCOSE BLD-MCNC: 323 MG/DL (ref 75–110)
GLUCOSE SERPL-MCNC: 277 MG/DL (ref 70–99)
GLUCOSE SERPL-MCNC: 291 MG/DL (ref 70–99)
GLUCOSE UR STRIP.AUTO-MCNC: ABNORMAL MG/DL
HBA1C MFR BLD: 11.7 % (ref 4–6)
HCT VFR BLD AUTO: 41.4 % (ref 40.7–50.3)
HCT VFR BLD AUTO: 43.5 % (ref 40.7–50.3)
HDLC SERPL-MCNC: 10 MG/DL
HGB BLD-MCNC: 14.4 G/DL (ref 13–17)
HGB BLD-MCNC: 14.6 G/DL (ref 13–17)
IMMATURE GRANULOCYTES: 0 %
KETONES UR STRIP.AUTO-MCNC: NEGATIVE MG/DL
LDLC SERPL CALC-MCNC: ABNORMAL MG/DL (ref 0–130)
LDLC SERPL DIRECT ASSAY-MCNC: 52 MG/DL
LEUKOCYTE ESTERASE UR QL STRIP.AUTO: NEGATIVE
LV EF: 63 %
LVEF MODALITY: NORMAL
LYMPHOCYTES # BLD: 25 % (ref 24–43)
MAGNESIUM SERPL-MCNC: 1.5 MG/DL (ref 1.6–2.6)
MCH RBC QN AUTO: 28.2 PG (ref 25.2–33.5)
MCH RBC QN AUTO: 28.8 PG (ref 25.2–33.5)
MCHC RBC AUTO-ENTMCNC: 33.6 G/DL (ref 28.4–34.8)
MCHC RBC AUTO-ENTMCNC: 34.8 G/DL (ref 28.4–34.8)
MCV RBC AUTO: 82.8 FL (ref 82.6–102.9)
MCV RBC AUTO: 84.1 FL (ref 82.6–102.9)
MONOCYTES # BLD: 7 % (ref 3–12)
NITRITE UR QL STRIP.AUTO: NEGATIVE
NRBC AUTOMATED: 0 PER 100 WBC
NRBC AUTOMATED: 0 PER 100 WBC
PDW BLD-RTO: 13.1 % (ref 11.8–14.4)
PDW BLD-RTO: 13.3 % (ref 11.8–14.4)
PLATELET # BLD AUTO: 157 K/UL (ref 138–453)
PLATELET # BLD AUTO: 158 K/UL (ref 138–453)
PMV BLD AUTO: 10.2 FL (ref 8.1–13.5)
PMV BLD AUTO: 10.4 FL (ref 8.1–13.5)
POTASSIUM SERPL-SCNC: 4 MMOL/L (ref 3.7–5.3)
POTASSIUM SERPL-SCNC: 4 MMOL/L (ref 3.7–5.3)
PROT UR STRIP.AUTO-MCNC: 6.5 MG/DL (ref 5–8)
PROT UR STRIP.AUTO-MCNC: NEGATIVE MG/DL
RBC # BLD: 5 M/UL (ref 4.21–5.77)
RBC # BLD: 5.17 M/UL (ref 4.21–5.77)
RBC CLUMPS #/AREA URNS AUTO: ABNORMAL /HPF (ref 0–2)
SEG NEUTROPHILS: 63 % (ref 36–65)
SEGMENTED NEUTROPHILS ABSOLUTE COUNT: 4.32 K/UL (ref 1.5–8.1)
SODIUM SERPL-SCNC: 132 MMOL/L (ref 135–144)
SODIUM SERPL-SCNC: 133 MMOL/L (ref 135–144)
SPECIFIC GRAVITY UA: 1.01 (ref 1–1.03)
TOTAL PROTEIN, URINE: 10 MG/DL
TRIGL SERPL-MCNC: 418 MG/DL
TURBIDITY: CLEAR
URINE HGB: NEGATIVE
URINE TOTAL PROTEIN CREATININE RATIO: 0.08 (ref 0–0.2)
UROBILINOGEN, URINE: NORMAL
WBC # BLD AUTO: 6.9 K/UL (ref 3.5–11.3)
WBC # BLD AUTO: 8.2 K/UL (ref 3.5–11.3)
WBC UA: ABNORMAL /HPF (ref 0–5)

## 2023-05-09 PROCEDURE — 80061 LIPID PANEL: CPT

## 2023-05-09 PROCEDURE — 84156 ASSAY OF PROTEIN URINE: CPT

## 2023-05-09 PROCEDURE — 81001 URINALYSIS AUTO W/SCOPE: CPT

## 2023-05-09 PROCEDURE — 6360000002 HC RX W HCPCS: Performed by: NURSE PRACTITIONER

## 2023-05-09 PROCEDURE — G0378 HOSPITAL OBSERVATION PER HR: HCPCS

## 2023-05-09 PROCEDURE — 85025 COMPLETE CBC W/AUTO DIFF WBC: CPT

## 2023-05-09 PROCEDURE — 83735 ASSAY OF MAGNESIUM: CPT

## 2023-05-09 PROCEDURE — 78452 HT MUSCLE IMAGE SPECT MULT: CPT

## 2023-05-09 PROCEDURE — 99232 SBSQ HOSP IP/OBS MODERATE 35: CPT | Performed by: INTERNAL MEDICINE

## 2023-05-09 PROCEDURE — 2580000003 HC RX 258: Performed by: NURSE PRACTITIONER

## 2023-05-09 PROCEDURE — 6370000000 HC RX 637 (ALT 250 FOR IP): Performed by: INTERNAL MEDICINE

## 2023-05-09 PROCEDURE — 83721 ASSAY OF BLOOD LIPOPROTEIN: CPT

## 2023-05-09 PROCEDURE — 36415 COLL VENOUS BLD VENIPUNCTURE: CPT

## 2023-05-09 PROCEDURE — 3430000000 HC RX DIAGNOSTIC RADIOPHARMACEUTICAL: Performed by: NURSE PRACTITIONER

## 2023-05-09 PROCEDURE — 93017 CV STRESS TEST TRACING ONLY: CPT

## 2023-05-09 PROCEDURE — 83036 HEMOGLOBIN GLYCOSYLATED A1C: CPT

## 2023-05-09 PROCEDURE — 96365 THER/PROPH/DIAG IV INF INIT: CPT

## 2023-05-09 PROCEDURE — 82570 ASSAY OF URINE CREATININE: CPT

## 2023-05-09 PROCEDURE — A9500 TC99M SESTAMIBI: HCPCS | Performed by: NURSE PRACTITIONER

## 2023-05-09 PROCEDURE — 85027 COMPLETE CBC AUTOMATED: CPT

## 2023-05-09 PROCEDURE — 82947 ASSAY GLUCOSE BLOOD QUANT: CPT

## 2023-05-09 PROCEDURE — 6370000000 HC RX 637 (ALT 250 FOR IP): Performed by: NURSE PRACTITIONER

## 2023-05-09 PROCEDURE — 96366 THER/PROPH/DIAG IV INF ADDON: CPT

## 2023-05-09 PROCEDURE — 80048 BASIC METABOLIC PNL TOTAL CA: CPT

## 2023-05-09 PROCEDURE — 96372 THER/PROPH/DIAG INJ SC/IM: CPT

## 2023-05-09 RX ORDER — OMEGA-3-ACID ETHYL ESTERS 1 G/1
2 CAPSULE, LIQUID FILLED ORAL 2 TIMES DAILY
Status: DISCONTINUED | OUTPATIENT
Start: 2023-05-09 | End: 2023-05-10 | Stop reason: HOSPADM

## 2023-05-09 RX ORDER — ALBUTEROL SULFATE 90 UG/1
2 AEROSOL, METERED RESPIRATORY (INHALATION) PRN
Status: ACTIVE | OUTPATIENT
Start: 2023-05-09 | End: 2023-05-09

## 2023-05-09 RX ORDER — MONTELUKAST SODIUM 10 MG/1
10 TABLET ORAL NIGHTLY
Status: DISCONTINUED | OUTPATIENT
Start: 2023-05-09 | End: 2023-05-10 | Stop reason: HOSPADM

## 2023-05-09 RX ORDER — NITROGLYCERIN 0.4 MG/1
0.4 TABLET SUBLINGUAL EVERY 5 MIN PRN
Status: ACTIVE | OUTPATIENT
Start: 2023-05-09 | End: 2023-05-09

## 2023-05-09 RX ORDER — AMLODIPINE BESYLATE 10 MG/1
10 TABLET ORAL DAILY
Status: DISCONTINUED | OUTPATIENT
Start: 2023-05-10 | End: 2023-05-10 | Stop reason: HOSPADM

## 2023-05-09 RX ORDER — MONTELUKAST SODIUM 10 MG/1
10 TABLET ORAL NIGHTLY
COMMUNITY

## 2023-05-09 RX ORDER — ACETAMINOPHEN 325 MG/1
650 TABLET ORAL EVERY 4 HOURS PRN
Status: DISCONTINUED | OUTPATIENT
Start: 2023-05-09 | End: 2023-05-10 | Stop reason: HOSPADM

## 2023-05-09 RX ORDER — METOPROLOL TARTRATE 5 MG/5ML
5 INJECTION INTRAVENOUS EVERY 5 MIN PRN
Status: ACTIVE | OUTPATIENT
Start: 2023-05-09 | End: 2023-05-09

## 2023-05-09 RX ORDER — INSULIN GLARGINE 100 [IU]/ML
28 INJECTION, SOLUTION SUBCUTANEOUS DAILY
Status: DISCONTINUED | OUTPATIENT
Start: 2023-05-09 | End: 2023-05-10 | Stop reason: HOSPADM

## 2023-05-09 RX ORDER — PIOGLITAZONEHYDROCHLORIDE 15 MG/1
15 TABLET ORAL DAILY
Status: DISCONTINUED | OUTPATIENT
Start: 2023-05-09 | End: 2023-05-10 | Stop reason: HOSPADM

## 2023-05-09 RX ORDER — FERROUS SULFATE 325(65) MG
325 TABLET ORAL
COMMUNITY

## 2023-05-09 RX ORDER — TETRAKIS(2-METHOXYISOBUTYLISOCYANIDE)COPPER(I) TETRAFLUOROBORATE 1 MG/ML
15.8 INJECTION, POWDER, LYOPHILIZED, FOR SOLUTION INTRAVENOUS
Status: COMPLETED | OUTPATIENT
Start: 2023-05-09 | End: 2023-05-09

## 2023-05-09 RX ORDER — LANOLIN ALCOHOL/MO/W.PET/CERES
400 CREAM (GRAM) TOPICAL DAILY
Status: DISCONTINUED | OUTPATIENT
Start: 2023-05-09 | End: 2023-05-10 | Stop reason: HOSPADM

## 2023-05-09 RX ORDER — ATROPINE SULFATE 0.1 MG/ML
0.5 INJECTION INTRAVENOUS EVERY 5 MIN PRN
Status: ACTIVE | OUTPATIENT
Start: 2023-05-09 | End: 2023-05-09

## 2023-05-09 RX ORDER — CARVEDILOL 12.5 MG/1
12.5 TABLET ORAL DAILY
Status: DISCONTINUED | OUTPATIENT
Start: 2023-05-10 | End: 2023-05-10 | Stop reason: HOSPADM

## 2023-05-09 RX ORDER — TOPIRAMATE 25 MG/1
50 TABLET ORAL 2 TIMES DAILY
Status: DISCONTINUED | OUTPATIENT
Start: 2023-05-09 | End: 2023-05-10 | Stop reason: HOSPADM

## 2023-05-09 RX ORDER — CARVEDILOL 12.5 MG/1
12.5 TABLET ORAL 2 TIMES DAILY WITH MEALS
COMMUNITY

## 2023-05-09 RX ORDER — ROSUVASTATIN CALCIUM 20 MG/1
20 TABLET, COATED ORAL DAILY
COMMUNITY

## 2023-05-09 RX ORDER — AMLODIPINE BESYLATE 10 MG/1
10 TABLET ORAL DAILY
COMMUNITY

## 2023-05-09 RX ORDER — TETRAKIS(2-METHOXYISOBUTYLISOCYANIDE)COPPER(I) TETRAFLUOROBORATE 1 MG/ML
38.3 INJECTION, POWDER, LYOPHILIZED, FOR SOLUTION INTRAVENOUS
Status: COMPLETED | OUTPATIENT
Start: 2023-05-09 | End: 2023-05-09

## 2023-05-09 RX ORDER — LANOLIN ALCOHOL/MO/W.PET/CERES
400 CREAM (GRAM) TOPICAL DAILY
COMMUNITY

## 2023-05-09 RX ORDER — AMINOPHYLLINE DIHYDRATE 25 MG/ML
50 INJECTION, SOLUTION INTRAVENOUS PRN
Status: ACTIVE | OUTPATIENT
Start: 2023-05-09 | End: 2023-05-09

## 2023-05-09 RX ORDER — GLIPIZIDE 10 MG/1
10 TABLET ORAL
Status: DISCONTINUED | OUTPATIENT
Start: 2023-05-09 | End: 2023-05-10 | Stop reason: HOSPADM

## 2023-05-09 RX ORDER — GLIPIZIDE 10 MG/1
10 TABLET ORAL
Status: DISCONTINUED | OUTPATIENT
Start: 2023-05-10 | End: 2023-05-09

## 2023-05-09 RX ORDER — LANOLIN ALCOHOL/MO/W.PET/CERES
325 CREAM (GRAM) TOPICAL
Status: DISCONTINUED | OUTPATIENT
Start: 2023-05-10 | End: 2023-05-10 | Stop reason: HOSPADM

## 2023-05-09 RX ORDER — TOPIRAMATE 25 MG/1
50 TABLET ORAL 2 TIMES DAILY
COMMUNITY

## 2023-05-09 RX ORDER — SODIUM CHLORIDE 9 MG/ML
500 INJECTION, SOLUTION INTRAVENOUS CONTINUOUS PRN
Status: ACTIVE | OUTPATIENT
Start: 2023-05-09 | End: 2023-05-09

## 2023-05-09 RX ORDER — SODIUM CHLORIDE 0.9 % (FLUSH) 0.9 %
5-40 SYRINGE (ML) INJECTION PRN
Status: ACTIVE | OUTPATIENT
Start: 2023-05-09 | End: 2023-05-09

## 2023-05-09 RX ADMIN — Medication 38.3 MILLICURIE: at 09:35

## 2023-05-09 RX ADMIN — INSULIN GLARGINE 28 UNITS: 100 INJECTION, SOLUTION SUBCUTANEOUS at 11:21

## 2023-05-09 RX ADMIN — Medication 15.8 MILLICURIE: at 08:11

## 2023-05-09 RX ADMIN — SODIUM CHLORIDE, PRESERVATIVE FREE 10 ML: 5 INJECTION INTRAVENOUS at 09:38

## 2023-05-09 RX ADMIN — ASPIRIN 81 MG: 81 TABLET, COATED ORAL at 08:02

## 2023-05-09 RX ADMIN — MAGNESIUM SULFATE HEPTAHYDRATE 1000 MG: 1 INJECTION, SOLUTION INTRAVENOUS at 13:13

## 2023-05-09 RX ADMIN — MAGNESIUM SULFATE HEPTAHYDRATE 1000 MG: 1 INJECTION, SOLUTION INTRAVENOUS at 11:23

## 2023-05-09 RX ADMIN — ALLOPURINOL 100 MG: 100 TABLET ORAL at 08:02

## 2023-05-09 RX ADMIN — MONTELUKAST 10 MG: 10 TABLET, FILM COATED ORAL at 22:23

## 2023-05-09 RX ADMIN — GLIPIZIDE 10 MG: 10 TABLET ORAL at 17:10

## 2023-05-09 RX ADMIN — OMEGA-3-ACID ETHYL ESTERS 2 G: 1 CAPSULE, LIQUID FILLED ORAL at 22:23

## 2023-05-09 RX ADMIN — FOLIC ACID TAB 400 MCG 400 MCG: 400 TAB at 12:07

## 2023-05-09 RX ADMIN — CARVEDILOL 6.25 MG: 6.25 TABLET, FILM COATED ORAL at 13:13

## 2023-05-09 RX ADMIN — ENOXAPARIN SODIUM 30 MG: 100 INJECTION SUBCUTANEOUS at 11:20

## 2023-05-09 RX ADMIN — PIOGLITAZONE 15 MG: 15 TABLET ORAL at 11:21

## 2023-05-09 RX ADMIN — INSULIN LISPRO 6 UNITS: 100 INJECTION, SOLUTION INTRAVENOUS; SUBCUTANEOUS at 16:14

## 2023-05-09 RX ADMIN — INSULIN LISPRO 4 UNITS: 100 INJECTION, SOLUTION INTRAVENOUS; SUBCUTANEOUS at 12:07

## 2023-05-09 RX ADMIN — INSULIN LISPRO 6 UNITS: 100 INJECTION, SOLUTION INTRAVENOUS; SUBCUTANEOUS at 08:06

## 2023-05-09 RX ADMIN — ENOXAPARIN SODIUM 30 MG: 100 INJECTION SUBCUTANEOUS at 22:23

## 2023-05-09 RX ADMIN — METFORMIN HYDROCHLORIDE 500 MG: 500 TABLET ORAL at 16:14

## 2023-05-09 RX ADMIN — SODIUM CHLORIDE, PRESERVATIVE FREE 5 ML: 5 INJECTION INTRAVENOUS at 22:29

## 2023-05-09 RX ADMIN — ACETAMINOPHEN 650 MG: 325 TABLET ORAL at 16:28

## 2023-05-09 RX ADMIN — AMLODIPINE BESYLATE 5 MG: 5 TABLET ORAL at 13:15

## 2023-05-09 RX ADMIN — TOPIRAMATE 50 MG: 25 TABLET, FILM COATED ORAL at 22:22

## 2023-05-09 RX ADMIN — REGADENOSON 0.4 MG: 0.08 INJECTION, SOLUTION INTRAVENOUS at 09:38

## 2023-05-09 ASSESSMENT — PAIN DESCRIPTION - DESCRIPTORS
DESCRIPTORS: DISCOMFORT
DESCRIPTORS: ACHING
DESCRIPTORS: DISCOMFORT
DESCRIPTORS: ACHING

## 2023-05-09 ASSESSMENT — PAIN DESCRIPTION - LOCATION
LOCATION: CHEST
LOCATION: HEAD
LOCATION: HEAD
LOCATION: CHEST

## 2023-05-09 ASSESSMENT — PAIN SCALES - GENERAL
PAINLEVEL_OUTOF10: 4
PAINLEVEL_OUTOF10: 4
PAINLEVEL_OUTOF10: 5
PAINLEVEL_OUTOF10: 5

## 2023-05-09 ASSESSMENT — PAIN DESCRIPTION - ONSET
ONSET: ON-GOING
ONSET: ON-GOING

## 2023-05-09 ASSESSMENT — PAIN DESCRIPTION - FREQUENCY
FREQUENCY: CONTINUOUS
FREQUENCY: INTERMITTENT
FREQUENCY: CONTINUOUS

## 2023-05-09 ASSESSMENT — PAIN DESCRIPTION - PAIN TYPE
TYPE: CHRONIC PAIN
TYPE: OTHER (COMMENT)
TYPE: ACUTE PAIN

## 2023-05-09 ASSESSMENT — PAIN - FUNCTIONAL ASSESSMENT
PAIN_FUNCTIONAL_ASSESSMENT: ACTIVITIES ARE NOT PREVENTED
PAIN_FUNCTIONAL_ASSESSMENT: PREVENTS OR INTERFERES SOME ACTIVE ACTIVITIES AND ADLS

## 2023-05-09 ASSESSMENT — PAIN DESCRIPTION - ORIENTATION
ORIENTATION: UPPER;MID
ORIENTATION: MID
ORIENTATION: MID

## 2023-05-09 NOTE — PROGRESS NOTES
End Of Shift Note  [address] CVICU  Summary of shift: Patient has been NPO since midnight for stress test today. He continues to have chest pressure of 5/10 that he's had since admission. He denies any radiating, sob, or nausea associated with it and states he is okay without pain medication. Ex-wife brought in list of home medications and copy was placed into chart on unit (red binder). Home medication list updated in [facility] based on list. Pharmacy consult still made for [facility] for accuracy. Vital signs were stable throughout shift.      Vitals:    Vitals:    05/08/23 2010 05/09/23 0000 05/09/23 0400 05/09/23 0415   BP: (!) 145/79 (!) 142/86 (!) 154/88 135/84   Pulse: 77 72 69 72   Resp: 15 18 18    Temp:  98.6 °F (37 °C) 98.4 °F (36.9 °C)    TempSrc:  Temporal Temporal    SpO2:  91% 94%    Weight:            I&O:   Intake/Output Summary (Last 24 hours) at 5/9/2023 0557  Last data filed at 5/8/2023 1800  Gross per 24 hour   Intake 959.8 ml   Output --   Net 959.8 ml       Resp Status: Room air 95%    Ventilator Settings:     / / /     Critical Care IV infusions:   dextrose      sodium chloride      sodium chloride 50 mL/hr at 05/08/23 1800        LDA:   Peripheral IV 05/08/23 Left Antecubital (Active)   Number of days: 0

## 2023-05-09 NOTE — FLOWSHEET NOTE
05/09/23 1116   Treatment Team Notification   Reason for Communication Review case   Team Member Name Dr. Eze Vasquez Team Role Attending Provider   Method of Communication Secure Message   Response Waiting for response   Notification Time [phone]     Writer contacted Dr. Екатерина Casas, via secure message. Pt complaining of headache. Noted no PRN for aceteminophen in med profile. Dr. Екатерина Casas contacted for order. Prior to contacting physician, in light of patients allergy to percocet, RN confirmed that pt has taken aceteminophen with no adverse effect. Pt stated that he is able to take aceteminophen with no adverse effect. 1149: RN received response from Dr. Екатерина Casas, via secure message, advising RN \"OK\" for PRN aceteminophen. Order Placed.

## 2023-05-09 NOTE — PROGRESS NOTES
Transitions of Care Pharmacy Service   Medication Review    The patient's list of current home medications has been reviewed. Patient gets his medications in a compliance pack from [employer]. He denies taking any other medications    Source(s) of information: Patients/compliance pack label    Based on information provided by the above source(s), I have updated the patient's home med list as described below. Please review the ACTION REQUESTED section of this note below for any discrepancies on current hospital orders. I changed or updated the following medications on the patient's home medication list:  Removed Aspirin 81mg  Cleocin 300mg  Lasix 20mg  Vascepa 1 Gm  Levofloxacin  Actos 45 mg  Urocit-K  Zocor 20mg     Added none     Adjusted   Metformin to 500mg pO BID  Norvasc to 10mg PO daily  Coreg to 12.5mg PO BID   Other Notes none         PROVIDER ACTION REQUESTED  Medications that need to be addressed by a physician/nurse practitioner:    Medication Action Requested   Vascepa     Please DC as not a current home med         Please feel free to call me with any questions about this encounter. Thank you.     Benja Nunn [facility]   Transitions of Care Pharmacy Service  Phone:  [phone]  Fax: [phone]      Electronically signed by Benja Nunn [facility] on 5/9/2023 at 3:19 PM         Medications Prior to Admission: montelukast (SINGULAIR) 10 MG tablet, Take 1 tablet by mouth nightly  rosuvastatin (CRESTOR) 20 MG tablet, Take 1 tablet by mouth daily  topiramate (TOPAMAX) 25 MG tablet, Take 2 tablets by mouth 2 times daily For 25mg tab, take two tabs 2 times a day  folic acid (FOLVITE) 303 MCG tablet, Take 1 tablet by mouth daily  ferrous sulfate (IRON 325) 325 (65 Fe) MG tablet, Take 1 tablet by mouth daily (with breakfast)  Cyanocobalamin (EQL VITAMIN B-12 TR PO), Take 1,000 mcg by mouth daily  amLODIPine (NORVASC) 10 MG tablet, Take 1 tablet by mouth daily  carvedilol (COREG) 12.5 MG tablet, Take 1 tablet

## 2023-05-09 NOTE — PROGRESS NOTES
[facility] NOTE    Room # 2032/2032-01   Name: Miguel Angel Christine              Reason for visit: Routine    I visited the patient. Admit Date & Time: 5/8/2023  9:37 AM    Assessment:  Miguel Angel Christine is a 47 y.o. male. Upon entering the room patient was out of the room. Intervention:   provided a ministry presence and brief prayer. Outcome:  Patient did not respond. Plan:  Chaplains will remain available to offer spiritual and emotional support as needed. Electronically signed by Bryan Blanco.  Chaplain Nick, on 5/9/2023 at 11:29 AM.  Cristin      05/09/23 1123   Encounter Summary   Service Provided For: Patient not available   Referral/Consult From: [facility]   Support System Unknown   Last Encounter  05/09/23   Complexity of Encounter Low   Begin Time 1013   End Time  1014   Total Time Calculated 1 min   Encounter    Type Initial Screen/Assessment   Assessment/Intervention/Outcome   Assessment Unable to assess   Intervention Prayer (assurance of)/Martha;Sustaining Presence/Ministry of presence

## 2023-05-09 NOTE — PROGRESS NOTES
At approximately 1620 patient's monitor alarming with oxygen saturations of 88%-89%. Upon examination patient is resting in bed, supine and high-fowlers, denying any dyspnea or SOB. After approximately 5 minutes and patient's oxygen saturations did not improve, patient was put on supplemental oxygen, via nasal cannula, at 2L's. Oxygen saturation improved following application of oxygen.

## 2023-05-09 NOTE — PROGRESS NOTES
[] Medication Reconciliation was completed and the patient's home medication list was verified. The Med List Status is \"Complete\". The following sources were used to assist with Medication Reconciliation:    [x] Patient had a list of medications which was transcribed into the EHR. [] Patient provided bottles of their medications    [] Home medications reviewed and confirmed with      [] Contacted patient's pharmacy to confirm home medications    [] Contacted patient's physician office to confirm home medications    [] Medical Records from another facility and/or Care Everywhere were reviewed      [x] There are one or more home medications that need clarification before Medication Reconciliation can be completed. The Med List Status has been marked as In Progress. To assist with Home Medication Reconciliation the following actions have been taken:    [x] Pharmacy medication reconciliation service requested. (Note: This can be done by sending a Perfect Serve message to [facility] Rec Pharmacist or by Caren Triplett [phone].)  [] Family requested to bring medications into the hospital  [] Family requested to call hospital with medication list  [] Message left with physician office  [] Request for medical records made to    [x] Other  ( see below)         Patients ex-wife brought in medication list from home pharmacy ([facility]) that was last updated 4/3/23. Medications updated into home medications tab. Copy of list added to chart on unit.

## 2023-05-09 NOTE — FLOWSHEET NOTE
05/09/23 1038   Treatment Team Notification   Reason for Communication Review case   Team Member Name Dr. Dara Lopez Provider   Method of Communication Page   Response Waiting for response   Notification Time [phone]     As per Internal Medicine's order [Dr. Keke Weston, Cardiology [Dr. Jeannette Mckinley was paged to alert to consult request. Margarito Evelyn spoke with answering service who stated that they would page physician. 1041: Writer receives telephone call from Dr. Shen Turpin. Writer informed him of Consult Request as well as information related to patient's presentation to ER and subsequent admitting diagnosis. Dr. Jose Mcrae was informed that the patient was presently at Stress lab for a Stress Test. Dr. Jose Mcrae instructed writer to contact him as to any negative findings. Writer will continue to monitor. 1055: Results of Stress Test are negative. Dr. Jose Mcrae was not contacted.

## 2023-05-09 NOTE — PROCEDURES
[address] [facility] [place] [address] [address]. [facility]                              CARDIAC STRESS TEST    PATIENT NAME: Amita Martínez                  :        1970  MED REC NO:   [id number]                             ROOM:         ACCOUNT NO:   [de-identified]                           ADMIT DATE: 2023  PROVIDER:     Shiva Hatch    DATE OF STUDY:  2023    LEXISCAN PHARMACOLOGIC STRESS TEST    ATTENDING PROVIDER:  Farshad Hernandez    PRIMARY CARE PROVIDER:  Zulema Paw Paw    PERFORMING PHYSICIAN:  Shiva Marina MD    Indication:  Chest pain. The baseline blood pressure was 137/76 mmHg with a heart rate of 74. Conchetta Carrow was infused using the standard protocol. The patient tolerated the procedure well with no complaints. With Lexiscan infusion, there were no significant changes in blood  pressure. The baseline EKG demonstrated sinus rhythm. Normal EKG. Lexiscan infusion did not demonstrate ST changes diagnostic of ischemia. No atrial or ventricular ectopy was noted during the study. EKG response is negative for ischemic changes. IMPRESSION:  EKG portion of pharmacologic stress test is negative for  ischemia.         Alda Gitelman    D: 2023 11:11:39       T: 2023 11:14:13     MT/[email or web address]  Job#: [id number]     Doc#: Unknown

## 2023-05-09 NOTE — FLOWSHEET NOTE
05/09/23 1056   Treatment Team Notification   Reason for Communication Review case   Team Member Name Dr Sadia Akers Team Role Attending Provider   Method of Communication Secure Message   Response Waiting for response   Notification Time 80     Writer contacted Dr. Julia Acevedo, via secure message to advise of patient's return to the unit post Stress test. Writer inquired if patient could be taken off of NPO status and previous diet resumed. Additionally, pt has voiced desire to take a shower. Writer inquired if this activity would be OK? Awaiting reply. 1105: Dr. Julia Acevedo responded, via secure message, with \"OK\" for resumption of previous diet and \"OK' for shower. Diet order changed; pt informed of \"OK\" for shower.

## 2023-05-09 NOTE — FLOWSHEET NOTE
05/09/23 1635   Treatment Team Notification   Reason for Communication Review case   Team Member Name Dr. Manolo Simms Team Role Attending Provider   Method of Communication Secure Message   Response Waiting for response   Notification Time [phone]     Writer contacted Dr. Venecia Hernandez, via secure message, regarding concerns for persistent elevated sugars. Awaiting reply. 0589: Dr. Venecia Hernandez responded via secure message, an awareness of concern. Dr. Venecia Hernandez indicated that an order has been placed for one time dose of glipizide which is to be administered now. RN was also advised that should blood sugars remain elevated, pt's sliding scale coverage will be adjusted.

## 2023-05-09 NOTE — PROGRESS NOTES
[facility]  Office: [address], DO, Angi Buck, DO, Odin Holguin, DO, Manjit Valenzuela Blood, DO, Zenia Yuen MD, Casie White MD, Ruth Moreira MD, Dorinda Welch MD,  Sonny De La Cruz MD, Pavel Mueller MD, Rocael Barone DO, Katharina Fernandez MD,  Enio Pantoja MD, Zeyad Thornton MD, Jordy Castillo DO, Marshal Vaca MD, Sindhu Machuca MD, Anastacia Ramírez, DO, Gaetano Morse MD, Tin Huizar MD, Chencho Turner MD, Abdias Jenkins MD,  Diamond Morales DO, Ariana Malin MD,  Jasbir Doll, NAYLA,  Kimberly Lamar CNP, Ben Neely, CNP, Gutierrez Hall, CNP,  Joel Negrete, DNP, Jeanie Cano, CNP, Ely Block, CNP, Ravindra Montalvo, CNP, Sheyla Wright, CNP, Yamila Espinal, CNP, Abe Alvarez PA-C, Ned Rey, CNS, Dona Rodriguez, CNP, Jaz Mccall, CNP         [address]    Progress Note    5/9/2023    10:34 AM    Name:   Broderick Joshua  MRN:     [id number]     [place]:      [de-identified]   Room:   2032/2032-01   Day:  0  Admit Date:  5/8/2023  9:37 AM    PCP:   Lynda Farah  Code Status:  Full Code    Subjective: Today patient isnt feeling well. He has some numbness and pain in his legs and his still having some chest discomfort that he says is made worse with palpation. Scr is elevated but per care everywhere pt seems to run high. He has no fevers, chills, nausea, vomiting       Brief History: This is a 73-year-old male who presents to the hospital for evaluation of chest pressure that woke him up from his sleep associated with nausea, diaphoresis and shortness of breath. Patient also has a history of chronic kidney disease. Patient had stress test which showed:     Medications: Allergies:     Allergies   Allergen Reactions    Penicillins Anaphylaxis     Throat swells    Percocet [Oxycodone-Acetaminophen] Anaphylaxis       Current Meds:   Scheduled Meds:    [START ON 5/10/2023]

## 2023-05-09 NOTE — CARE COORDINATION
Case Management Assessment  Initial Evaluation    Date/Time of Evaluation: 5/9/2023 8:53 AM  Assessment Completed by: Zarina Hart RN    If patient is discharged prior to next notation, then this note serves as note for discharge by case management. Patient Name: Nathan Murrieta                   YOB: 1970  Diagnosis: Stable angina ([address] [I20.8]  Chest pain, unspecified type [R07.9]                   Date / Time: 5/8/2023  9:37 AM    Patient Admission Status: Observation   Readmission Risk (Low < 19, Mod (19-27), High > 27): No data recorded  Current PCP: Terrell Evangelista  PCP verified by CM? Yes    Chart Reviewed: Yes      History Provided by: Patient  Patient Orientation: Alert and Oriented, Person, Place, Situation, Self    Patient Cognition: Alert    Hospitalization in the last 30 days (Readmission):  No    If yes, Readmission Assessment in CM Navigator will be completed. Advance Directives:      Code Status: Full Code   Patient's Primary Decision Maker is: Legal Next of Kin      Discharge Planning:    Patient lives with: Alone Type of Home: Apartment  Primary Care Giver: Self  Patient Support Systems include: Family Members   Current Financial resources: None  Current community resources: None  Current services prior to admission: C-pap            Current DME:              Type of Home Care services:  None    ADLS  Prior functional level: Independent in ADLs/IADLs  Current functional level: Independent in ADLs/IADLs    PT AM-PAC:   /24  OT AM-PAC:   /24    Family can provide assistance at DC: No  Would you like Case Management to discuss the discharge plan with any other family members/significant others, and if so, who?  No  Plans to Return to Present Housing: Yes  Other Identified Issues/Barriers to RETURNING to current housing: none  Potential Assistance needed at discharge: N/A            Potential DME:    Patient expects to discharge to: [address] [place] [address] for transportation at

## 2023-05-09 NOTE — PROGRESS NOTES
End Of Shift Note  [facility]    Summary of shift: The focus of today's shift was to complete Stress Test, and to confirm/reconcile patient's home medications. The Stress Test was completed and preliminary report reflects a \"negative\" result. The official reading of the Stress Test remains outstanding. Dr. Amarilis Fontenot has been made aware of Stress Test results. At the time of this writing he has not yet completed bedside rounding. Much time was taken to complete patient's home medication reconciliation. This was finally completed and confirmed. Throughout shift patient's blood sugars remained elevated. Coverage was provided as per standing orders. Please review Progress Notes for detailed information. Patient has consistently denied any complaint of chest pain or any chest discomfort. At the time of this writing patient is resting comfortably in bed, in NAD, watching TV. A full report, inclusive of PMH and current hospital stay has been provided to oncoming RN. There is only one outstanding issue which a urine collection that needs to be sent to lab to evaluate for excreted protein. Claudia RN is aware of this needed lab.     Vitals:    Vitals:    05/09/23 1600 05/09/23 1628 05/09/23 1700 05/09/23 1800   BP: (!) 141/80      Pulse: 70  67 77   Resp: 16      Temp: 97.7 °F (36.5 °C)      TempSrc: Temporal      SpO2: 94% (!) 88% 95% 96%   Weight:            I&O:   Intake/Output Summary (Last 24 hours) at 5/9/2023 1931  Last data filed at 5/9/2023 1800  Gross per 24 hour   Intake 916.8 ml   Output 525 ml   Net 391.8 ml       Resp Status: 2L's via NC    Ventilator Settings:     / / /     Critical Care IV infusions:   dextrose      sodium chloride          LDA:   Peripheral IV 05/08/23 Left Antecubital (Active)   Number of days: 1

## 2023-05-10 VITALS
RESPIRATION RATE: 16 BRPM | SYSTOLIC BLOOD PRESSURE: 122 MMHG | HEART RATE: 71 BPM | BODY MASS INDEX: 35 KG/M2 | TEMPERATURE: 98.7 F | WEIGHT: 243.9 LBS | OXYGEN SATURATION: 94 % | DIASTOLIC BLOOD PRESSURE: 79 MMHG

## 2023-05-10 LAB
ANION GAP SERPL CALCULATED.3IONS-SCNC: 7 MMOL/L (ref 9–17)
BUN SERPL-MCNC: 12 MG/DL (ref 6–20)
BUN/CREAT BLD: 10 (ref 9–20)
CALCIUM SERPL-MCNC: 8.4 MG/DL (ref 8.6–10.4)
CHLORIDE SERPL-SCNC: 100 MMOL/L (ref 98–107)
CO2 SERPL-SCNC: 27 MMOL/L (ref 20–31)
CREAT SERPL-MCNC: 1.17 MG/DL (ref 0.7–1.2)
GFR SERPL CREATININE-BSD FRML MDRD: >60 ML/MIN/1.73M2
GLUCOSE BLD-MCNC: 191 MG/DL (ref 75–110)
GLUCOSE BLD-MCNC: 307 MG/DL (ref 75–110)
GLUCOSE SERPL-MCNC: 205 MG/DL (ref 70–99)
HCT VFR BLD AUTO: 44.6 % (ref 40.7–50.3)
HGB BLD-MCNC: 14.8 G/DL (ref 13–17)
MAGNESIUM SERPL-MCNC: 2 MG/DL (ref 1.6–2.6)
MCH RBC QN AUTO: 28.1 PG (ref 25.2–33.5)
MCHC RBC AUTO-ENTMCNC: 33.2 G/DL (ref 28.4–34.8)
MCV RBC AUTO: 84.6 FL (ref 82.6–102.9)
NRBC AUTOMATED: 0 PER 100 WBC
PDW BLD-RTO: 13.2 % (ref 11.8–14.4)
PLATELET # BLD AUTO: 165 K/UL (ref 138–453)
PMV BLD AUTO: 10.4 FL (ref 8.1–13.5)
POTASSIUM SERPL-SCNC: 3.7 MMOL/L (ref 3.7–5.3)
RBC # BLD: 5.27 M/UL (ref 4.21–5.77)
SODIUM SERPL-SCNC: 134 MMOL/L (ref 135–144)
WBC # BLD AUTO: 7.1 K/UL (ref 3.5–11.3)

## 2023-05-10 PROCEDURE — 82947 ASSAY GLUCOSE BLOOD QUANT: CPT

## 2023-05-10 PROCEDURE — 83735 ASSAY OF MAGNESIUM: CPT

## 2023-05-10 PROCEDURE — 85027 COMPLETE CBC AUTOMATED: CPT

## 2023-05-10 PROCEDURE — 99232 SBSQ HOSP IP/OBS MODERATE 35: CPT | Performed by: STUDENT IN AN ORGANIZED HEALTH CARE EDUCATION/TRAINING PROGRAM

## 2023-05-10 PROCEDURE — 6370000000 HC RX 637 (ALT 250 FOR IP): Performed by: NURSE PRACTITIONER

## 2023-05-10 PROCEDURE — 36415 COLL VENOUS BLD VENIPUNCTURE: CPT

## 2023-05-10 PROCEDURE — 6360000002 HC RX W HCPCS: Performed by: NURSE PRACTITIONER

## 2023-05-10 PROCEDURE — 80048 BASIC METABOLIC PNL TOTAL CA: CPT

## 2023-05-10 PROCEDURE — 94761 N-INVAS EAR/PLS OXIMETRY MLT: CPT

## 2023-05-10 PROCEDURE — G0378 HOSPITAL OBSERVATION PER HR: HCPCS

## 2023-05-10 PROCEDURE — 2580000003 HC RX 258: Performed by: NURSE PRACTITIONER

## 2023-05-10 PROCEDURE — 96372 THER/PROPH/DIAG INJ SC/IM: CPT

## 2023-05-10 PROCEDURE — 6370000000 HC RX 637 (ALT 250 FOR IP): Performed by: INTERNAL MEDICINE

## 2023-05-10 RX ORDER — OMEGA-3-ACID ETHYL ESTERS 1 G/1
2 CAPSULE, LIQUID FILLED ORAL 2 TIMES DAILY
Qty: 60 CAPSULE | Refills: 3 | Status: SHIPPED | OUTPATIENT
Start: 2023-05-10

## 2023-05-10 RX ORDER — EZETIMIBE 10 MG/1
10 TABLET ORAL NIGHTLY
Qty: 30 TABLET | Refills: 3 | Status: SHIPPED | OUTPATIENT
Start: 2023-05-10

## 2023-05-10 RX ORDER — NITROGLYCERIN 0.4 MG/1
0.4 TABLET SUBLINGUAL EVERY 5 MIN PRN
Qty: 25 TABLET | Refills: 3 | Status: SHIPPED | OUTPATIENT
Start: 2023-05-10

## 2023-05-10 RX ORDER — PIOGLITAZONEHYDROCHLORIDE 15 MG/1
15 TABLET ORAL DAILY
Qty: 30 TABLET | Refills: 0 | Status: SHIPPED | OUTPATIENT
Start: 2023-05-10 | End: 2023-06-09

## 2023-05-10 RX ORDER — EZETIMIBE 10 MG/1
10 TABLET ORAL NIGHTLY
Status: DISCONTINUED | OUTPATIENT
Start: 2023-05-10 | End: 2023-05-10 | Stop reason: HOSPADM

## 2023-05-10 RX ADMIN — AMLODIPINE BESYLATE 10 MG: 10 TABLET ORAL at 09:32

## 2023-05-10 RX ADMIN — ASPIRIN 81 MG: 81 TABLET, COATED ORAL at 09:32

## 2023-05-10 RX ADMIN — SODIUM CHLORIDE, PRESERVATIVE FREE 10 ML: 5 INJECTION INTRAVENOUS at 09:30

## 2023-05-10 RX ADMIN — CARVEDILOL 12.5 MG: 12.5 TABLET, FILM COATED ORAL at 09:32

## 2023-05-10 RX ADMIN — GLIPIZIDE 10 MG: 10 TABLET ORAL at 06:07

## 2023-05-10 RX ADMIN — FOLIC ACID TAB 400 MCG 400 MCG: 400 TAB at 09:30

## 2023-05-10 RX ADMIN — METFORMIN HYDROCHLORIDE 500 MG: 500 TABLET ORAL at 11:02

## 2023-05-10 RX ADMIN — ALLOPURINOL 100 MG: 100 TABLET ORAL at 09:32

## 2023-05-10 RX ADMIN — FERROUS SULFATE TAB EC 325 MG (65 MG FE EQUIVALENT) 325 MG: 325 (65 FE) TABLET DELAYED RESPONSE at 09:36

## 2023-05-10 RX ADMIN — INSULIN LISPRO 6 UNITS: 100 INJECTION, SOLUTION INTRAVENOUS; SUBCUTANEOUS at 12:41

## 2023-05-10 RX ADMIN — ROSUVASTATIN CALCIUM 20 MG: 10 TABLET, FILM COATED ORAL at 09:32

## 2023-05-10 RX ADMIN — PIOGLITAZONE 15 MG: 15 TABLET ORAL at 09:30

## 2023-05-10 RX ADMIN — OMEGA-3-ACID ETHYL ESTERS 2 G: 1 CAPSULE, LIQUID FILLED ORAL at 09:32

## 2023-05-10 RX ADMIN — TOPIRAMATE 50 MG: 25 TABLET, FILM COATED ORAL at 09:30

## 2023-05-10 RX ADMIN — INSULIN GLARGINE 28 UNITS: 100 INJECTION, SOLUTION SUBCUTANEOUS at 09:30

## 2023-05-10 RX ADMIN — ENOXAPARIN SODIUM 30 MG: 100 INJECTION SUBCUTANEOUS at 09:30

## 2023-05-10 ASSESSMENT — ENCOUNTER SYMPTOMS
EYE PAIN: 0
EYE ITCHING: 0
ABDOMINAL DISTENTION: 0
BACK PAIN: 0
COLOR CHANGE: 0
SHORTNESS OF BREATH: 0
ABDOMINAL PAIN: 0
APNEA: 0

## 2023-05-10 NOTE — PROGRESS NOTES
End Of Shift Note  [address] [facility]  Summary of shift: uneventful night. Vitals remained stable. HR livier in the mid 50s while asleep. Urine sample sent for protein. Otherwise, no complaints of pain. Patient calls out appropriately for needs.     Vitals:    Vitals:    05/10/23 0000 05/10/23 0207 05/10/23 0400 05/10/23 0423   BP: 122/67  114/73    Pulse: 62 60 67    Resp: 16  16    Temp: 97.5 °F (36.4 °C)  97.5 °F (36.4 °C) 97.5 °F (36.4 °C)   TempSrc: Temporal  Temporal Temporal   SpO2: 95% 93% 94%    Weight:    243 lb 14.4 oz (110.6 kg)        I&O:   Intake/Output Summary (Last 24 hours) at 5/10/2023 0739  Last data filed at 5/10/2023 0423  Gross per 24 hour   Intake 328.96 ml   Output 1475 ml   Net -1146.04 ml       Resp Status: room air during day/2L NC at night    Ventilator Settings:     / / /     Critical Care IV infusions:   dextrose      sodium chloride          LDA:   Peripheral IV 05/08/23 Left Antecubital (Active)   Number of days: 1

## 2023-05-10 NOTE — PROGRESS NOTES
[facility]  Office: [address], DO, Allison Gonzales, DO, Toño Bennett, DO, Crista Pearce Blood, DO, Michelle Shah MD, Desiree Sumner MD, Elaine Neri MD, Magen Urban MD,  Lexy Espinosa MD, Lisset Guzman MD, Daphney Rubio, DO, Dannielle Dinh MD,  Dionisio Zhang MD, Tashia Reynolds MD, Aurea Calle DO, Joceline Wise MD, Nicole Bhandari MD, Homer Feldman DO, Stacy Kebede MD, German Baker MD, Steve Romo MD, José Miguel Eid MD,  Vic Siddiqui, DO, Caitlyn Cheng MD,  Emily Jones, CNP,  Priyanka Cross, CNP, Kunal Bridges, CNP, Roseline Peck, CNP,  Liam Brooks, [facility], Waqar Renee, CNP, Rafa Sena, CNP, William Diego, CNP, Mateus Landin, CNP, Maximo Vista, CNP, Rhoda Thomas PA-C, Damion Oglesby, CNS, Suhas Durant, CNP, Filemon Major, [facility]    Progress Note    5/10/2023    8:42 AM    Name:   Rojelio Purvis  MRN:     [id number]     [place]:      [de-identified]   Room:   2032/2032-01   Day:  0  Admit Date:  5/8/2023  9:37 AM    PCP:   Mart Reason  Code Status:  Full Code    Subjective:     C/C:   Chief Complaint   Patient presents with    Chest Pain     Pt reports midsternal chest pain since last night. States pain was 9/10 when it woke him up but rates pain 6/10 on arrival. Given 1 nitro, 500mL NS, and 324 ASA by EMS     Interval History Status: not changed. Patient seen and examined. Feeling ok, had some issues with some coldness in his right hand improved being under the blanket. Appears to have infiltrated IV at that side. Pulses stable and symmetrical in upper extremties via radial and brachial pulses. Brief History:     47year old male with past medical history of Obesity, diabetse, HTN, KATE presents with chest pain concern for unstable angina underwent stress test which was negative. Pateint started on ezetimibe in addition to statin and lovaza.  Patient

## 2023-05-10 NOTE — DISCHARGE INSTRUCTIONS
with food. You may need frequent blood tests. You may need to follow a special diet. Follow all instructions of your doctor or dietitian. Learn about the foods you should eat or avoid. Tell your doctor if you have a planned surgery. Store at room temperature away from moisture and heat. Do not freeze. What happens if I miss a dose? Take the medicine as soon as you can, but skip the missed dose if it is almost time for your next dose. Do not take two doses at one time. What happens if I overdose? Seek emergency medical attention or call the Poison Help line at 1-704.878.3683. What should I avoid while taking omega-3 polyunsaturated fatty acids? Avoid eating foods high in fat or cholesterol, or omega-3 polyunsaturated fatty acids will not be as effective. Avoid drinking alcohol. It can increase triglycerides and may make your condition worse. What are the possible side effects of omega-3 polyunsaturated fatty acids? Get emergency medical help if you have signs of an allergic reaction: hives; difficult breathing; swelling of your face, lips, tongue, or throat. Call your doctor at once if you have:  chest pain; or  uneven heartbeats. Common side effects may include:  loss of appetite;  diarrhea, constipation, upset stomach, belching;  back pain; or  dry mouth, altered sense of taste. This is not a complete list of side effects and others may occur. Call your doctor for medical advice about side effects. You may report side effects to FDA at 0-912-FDA-9853. What other drugs will affect omega-3 polyunsaturated fatty acids? Tell your doctor about all your other medicines, especially:  a blood thinner --warfarin, Coumadin, Jantoven. This list is not complete. Other drugs may affect omega-3 polyunsaturated fatty acids, including prescription and over-the-counter medicines, vitamins, and herbal products. Not all possible drug interactions are listed here. Where can I get more information?   Your pharmacist

## 2023-05-10 NOTE — PLAN OF CARE
Problem: Chronic Conditions and Co-morbidities  Goal: Patient's chronic conditions and co-morbidity symptoms are monitored and maintained or improved  Outcome: Progressing     Problem: Discharge Planning  Goal: Discharge to home or other facility with appropriate resources  Outcome: Progressing     Problem: Pain  Goal: Verbalizes/displays adequate comfort level or baseline comfort level  Outcome: Progressing  Flowsheets (Taken 5/9/2023 0800)  Verbalizes/displays adequate comfort level or baseline comfort level:   Encourage patient to monitor pain and request assistance   Assess pain using appropriate pain scale   Administer analgesics based on type and severity of pain and evaluate response   Implement non-pharmacological measures as appropriate and evaluate response     Problem: Safety - Adult  Goal: Free from fall injury  Outcome: Progressing  Flowsheets (Taken 5/9/2023 0800)  Free From Fall Injury: Instruct family/caregiver on patient safety     Problem: Metabolic/Fluid and Electrolytes - Adult  Goal: Electrolytes maintained within normal limits  Outcome: Progressing  Goal: Glucose maintained within prescribed range  Outcome: Progressing
on type and severity of pain and evaluate response   Implement non-pharmacological measures as appropriate and evaluate response     Problem: Safety - Adult  Goal: Free from fall injury  5/10/2023 0642 by Rico Bill RN  Outcome: Progressing  5/9/2023 1832 by Kenny Dent RN  Outcome: Progressing  Flowsheets (Taken 5/9/2023 0800)  Free From Fall Injury: Instruct family/caregiver on patient safety     Problem: Metabolic/Fluid and Electrolytes - Adult  Goal: Electrolytes maintained within normal limits  5/10/2023 0642 by Rico Bill RN  Outcome: Progressing  5/9/2023 1832 by Kenny Dent RN  Outcome: Progressing  Flowsheets (Taken 5/9/2023 0800)  Electrolytes maintained within normal limits:   Monitor labs and assess patient for signs and symptoms of electrolyte imbalances   Administer electrolyte replacement as ordered   Monitor response to electrolyte replacements, including repeat lab results as appropriate  Goal: Glucose maintained within prescribed range  5/10/2023 0642 by Rico Bill RN  Outcome: Progressing  5/9/2023 1832 by Kenny Dent RN  Outcome: Progressing  Flowsheets (Taken 5/9/2023 0800)  Glucose maintained within prescribed range:   Monitor blood glucose as ordered   Assess for signs and symptoms of hyperglycemia and hypoglycemia   Administer ordered medications to maintain glucose within target range   Instruct patient on self management of diabetes and initiate consult as needed

## 2023-05-10 NOTE — PROGRESS NOTES
Patient confirms that he has all of his personal belongings that he brought to the hospital in his possession, including his cell phone, wallet and keys. Patient ambulated off unit to be transported home by his friend. Patient alert, oriented and in no apparent distress upon departure of unit.

## 2023-05-10 NOTE — PROGRESS NOTES
Discharge Summary reviewed with patient and he verbalized understanding. Patient awaiting confirmation of transportation home by friend. Taxi service (free of charge) offered, but declined by patient, patient states wish to be transported by known person.

## 2023-05-10 NOTE — DISCHARGE SUMMARY
[facility]  Office: [address], DO, Morenita Contreras, DO, Kevin Garnica, DO, Juliomel Diaz Blood, DO, Leslee Singh MD, Sherley Villalobos MD, Lurdes Sage MD, Marija De MD,  Wanda Alpers, MD, Deepika Givens MD, Francisco Gray, DO, Alana Lopez MD,  Tamika Jeronimo, DO, Rehan Sanchez MD, Mimi Duke MD, Linda Root, DO, Arnulfo Palafox MD, Kimberly Fischer MD, Karla Rueda DO, Melania Wiggins MD, Christopher Al MD, Simeon Aschoff, MD, Isabel Stewart MD,  Silvia Ramirez DO, Mirza Junior MD,  Braulio Cain CNP,  Rico Mart, CNP, Keith Diaz, CNP, Ashley Taylor, CNP,  Max Seth, [facility], Mc Henriquez, CNP, Miriam Sol, CNP, Ofelia Matamoros, CNP, Linda Paez, CNP, Rosalva Kebede, CNP, Lizeth Garcia PATristanC, Wilner Kim, CNS, Abimael Alegre, CNP, Judith Velazquez, CNP         [address]    Discharge Summary     Patient ID: Cristiane Ramos  :  1970   MRN: [id number]     ACCOUNT:  [de-identified]   Patient's PCP: Dago Verdin  Admit Date: 2023   Discharge Date: 5/10/2023     Length of Stay: 0  Code Status:  Full Code  Admitting Physician: Karla Rueda DO  Discharge Physician: Alana Lopez MD     Active Discharge Diagnoses:     Hospital Problem Lists:  Principal Problem:    Unstable angina ([facility] [address]  Active Problems:    Type 2 diabetes mellitus without complication, without long-term current use of insulin ([facility] [address]    Hyperlipidemia    Essential hypertension    KATE (obstructive sleep apnea)    Class 2 obesity due to excess calories in adult  Resolved Problems:    * No resolved hospital problems. *      Admission Condition:  stable     Discharged Condition: stable    Hospital Stay:     Hospital Course:  Cristiane Ramos is a 47 y.o. male who was admitted for the management of   Unstable angina ([facility] [address] , presented to ER with Chest Pain (Pt reports midsternal chest pain since last night.  States pain

## 2023-11-08 LAB — PROSTATE SPECIFIC ANTIGEN: 0.82 NG/ML

## 2023-12-08 ENCOUNTER — APPOINTMENT (OUTPATIENT)
Dept: GENERAL RADIOLOGY | Age: 53
End: 2023-12-08
Payer: COMMERCIAL

## 2023-12-08 ENCOUNTER — HOSPITAL ENCOUNTER (EMERGENCY)
Age: 53
Discharge: HOME | End: 2023-12-08
Attending: EMERGENCY MEDICINE
Payer: COMMERCIAL

## 2023-12-08 VITALS
OXYGEN SATURATION: 95 % | HEART RATE: 99 BPM | DIASTOLIC BLOOD PRESSURE: 81 MMHG | WEIGHT: 245 LBS | TEMPERATURE: 98.3 F | SYSTOLIC BLOOD PRESSURE: 146 MMHG | BODY MASS INDEX: 35.15 KG/M2 | RESPIRATION RATE: 17 BRPM

## 2023-12-08 DIAGNOSIS — U07.1 COVID-19: ICD-10-CM

## 2023-12-08 DIAGNOSIS — E83.42 HYPOMAGNESEMIA: Primary | ICD-10-CM

## 2023-12-08 DIAGNOSIS — R73.9 HYPERGLYCEMIA: ICD-10-CM

## 2023-12-08 LAB
ANION GAP SERPL CALCULATED.3IONS-SCNC: 14 MMOL/L (ref 9–17)
BASOPHILS # BLD: 0.06 K/UL (ref 0–0.2)
BASOPHILS NFR BLD: 1 % (ref 0–2)
BUN SERPL-MCNC: 17 MG/DL (ref 6–20)
BUN/CREAT SERPL: 11 (ref 9–20)
CALCIUM SERPL-MCNC: 8.9 MG/DL (ref 8.6–10.4)
CHLORIDE SERPL-SCNC: 94 MMOL/L (ref 98–107)
CHP ED QC CHECK: 542
CO2 SERPL-SCNC: 23 MMOL/L (ref 20–31)
CREAT SERPL-MCNC: 1.5 MG/DL (ref 0.7–1.2)
EKG ATRIAL RATE: 113 BPM
EKG P AXIS: 33 DEGREES
EKG P-R INTERVAL: 156 MS
EKG Q-T INTERVAL: 336 MS
EKG QRS DURATION: 100 MS
EKG QTC CALCULATION (BAZETT): 460 MS
EKG R AXIS: -70 DEGREES
EKG T AXIS: 41 DEGREES
EKG VENTRICULAR RATE: 113 BPM
EOSINOPHIL # BLD: 0.29 K/UL (ref 0–0.44)
EOSINOPHILS RELATIVE PERCENT: 3 % (ref 1–4)
ERYTHROCYTE [DISTWIDTH] IN BLOOD BY AUTOMATED COUNT: 12.7 % (ref 11.8–14.4)
FLUAV RNA RESP QL NAA+PROBE: NOT DETECTED
FLUBV RNA RESP QL NAA+PROBE: NOT DETECTED
GFR SERPL CREATININE-BSD FRML MDRD: 56 ML/MIN/1.73M2
GLUCOSE BLD-MCNC: 370 MG/DL (ref 75–110)
GLUCOSE BLD-MCNC: 455 MG/DL (ref 75–110)
GLUCOSE BLD-MCNC: 542 MG/DL (ref 75–110)
GLUCOSE SERPL-MCNC: 645 MG/DL (ref 70–99)
HCT VFR BLD AUTO: 43.5 % (ref 40.7–50.3)
HGB BLD-MCNC: 15.2 G/DL (ref 13–17)
IMM GRANULOCYTES # BLD AUTO: 0.05 K/UL (ref 0–0.3)
IMM GRANULOCYTES NFR BLD: 0 %
INR PPP: 1.1
LYMPHOCYTES NFR BLD: 1.58 K/UL (ref 1.1–3.7)
LYMPHOCYTES RELATIVE PERCENT: 14 % (ref 24–43)
MAGNESIUM SERPL-MCNC: 1.5 MG/DL (ref 1.6–2.6)
MCH RBC QN AUTO: 29.5 PG (ref 25.2–33.5)
MCHC RBC AUTO-ENTMCNC: 34.9 G/DL (ref 28.4–34.8)
MCV RBC AUTO: 84.3 FL (ref 82.6–102.9)
MONOCYTES NFR BLD: 1.06 K/UL (ref 0.1–1.2)
MONOCYTES NFR BLD: 9 % (ref 3–12)
NEUTROPHILS NFR BLD: 73 % (ref 36–65)
NEUTS SEG NFR BLD: 8.19 K/UL (ref 1.5–8.1)
NRBC BLD-RTO: 0 PER 100 WBC
PARTIAL THROMBOPLASTIN TIME: 27.2 SEC (ref 23.9–33.8)
PHOSPHATE SERPL-MCNC: 3.4 MG/DL (ref 2.5–4.5)
PLATELET # BLD AUTO: 193 K/UL (ref 138–453)
PMV BLD AUTO: 10.3 FL (ref 8.1–13.5)
POTASSIUM SERPL-SCNC: 3.9 MMOL/L (ref 3.7–5.3)
PROTHROMBIN TIME: 13.7 SEC (ref 11.5–14.2)
RBC # BLD AUTO: 5.16 M/UL (ref 4.21–5.77)
SARS-COV-2 RNA RESP QL NAA+PROBE: DETECTED
SODIUM SERPL-SCNC: 131 MMOL/L (ref 135–144)
SOURCE: ABNORMAL
SPECIMEN DESCRIPTION: ABNORMAL
TROPONIN I SERPL HS-MCNC: 21 NG/L (ref 0–22)
TROPONIN I SERPL HS-MCNC: 24 NG/L (ref 0–22)
WBC OTHER # BLD: 11.2 K/UL (ref 3.5–11.3)

## 2023-12-08 PROCEDURE — 84484 ASSAY OF TROPONIN QUANT: CPT

## 2023-12-08 PROCEDURE — 83735 ASSAY OF MAGNESIUM: CPT

## 2023-12-08 PROCEDURE — 85025 COMPLETE CBC W/AUTO DIFF WBC: CPT

## 2023-12-08 PROCEDURE — 2580000003 HC RX 258: Performed by: EMERGENCY MEDICINE

## 2023-12-08 PROCEDURE — 84100 ASSAY OF PHOSPHORUS: CPT

## 2023-12-08 PROCEDURE — 6360000002 HC RX W HCPCS: Performed by: EMERGENCY MEDICINE

## 2023-12-08 PROCEDURE — 80048 BASIC METABOLIC PNL TOTAL CA: CPT

## 2023-12-08 PROCEDURE — 85610 PROTHROMBIN TIME: CPT

## 2023-12-08 PROCEDURE — 71045 X-RAY EXAM CHEST 1 VIEW: CPT

## 2023-12-08 PROCEDURE — 85730 THROMBOPLASTIN TIME PARTIAL: CPT

## 2023-12-08 PROCEDURE — 82947 ASSAY GLUCOSE BLOOD QUANT: CPT

## 2023-12-08 PROCEDURE — 99285 EMERGENCY DEPT VISIT HI MDM: CPT

## 2023-12-08 PROCEDURE — 87636 SARSCOV2 & INF A&B AMP PRB: CPT

## 2023-12-08 PROCEDURE — 96372 THER/PROPH/DIAG INJ SC/IM: CPT

## 2023-12-08 PROCEDURE — 6370000000 HC RX 637 (ALT 250 FOR IP): Performed by: EMERGENCY MEDICINE

## 2023-12-08 PROCEDURE — 96365 THER/PROPH/DIAG IV INF INIT: CPT

## 2023-12-08 RX ORDER — 0.9 % SODIUM CHLORIDE 0.9 %
1000 INTRAVENOUS SOLUTION INTRAVENOUS ONCE
Status: COMPLETED | OUTPATIENT
Start: 2023-12-08 | End: 2023-12-08

## 2023-12-08 RX ORDER — MAGNESIUM SULFATE 1 G/100ML
1000 INJECTION INTRAVENOUS ONCE
Status: COMPLETED | OUTPATIENT
Start: 2023-12-08 | End: 2023-12-08

## 2023-12-08 RX ORDER — MAGNESIUM SULFATE IN WATER 40 MG/ML
2000 INJECTION, SOLUTION INTRAVENOUS ONCE
Status: DISCONTINUED | OUTPATIENT
Start: 2023-12-08 | End: 2023-12-08

## 2023-12-08 RX ORDER — 0.9 % SODIUM CHLORIDE 0.9 %
500 INTRAVENOUS SOLUTION INTRAVENOUS ONCE
Status: COMPLETED | OUTPATIENT
Start: 2023-12-08 | End: 2023-12-08

## 2023-12-08 RX ADMIN — INSULIN HUMAN 10 UNITS: 100 INJECTION, SOLUTION PARENTERAL at 12:50

## 2023-12-08 RX ADMIN — SODIUM CHLORIDE 1000 ML: 9 INJECTION, SOLUTION INTRAVENOUS at 11:17

## 2023-12-08 RX ADMIN — INSULIN HUMAN 10 UNITS: 100 INJECTION, SOLUTION PARENTERAL at 10:17

## 2023-12-08 RX ADMIN — INSULIN HUMAN 10 UNITS: 100 INJECTION, SOLUTION PARENTERAL at 11:17

## 2023-12-08 RX ADMIN — SODIUM CHLORIDE 500 ML: 9 INJECTION, SOLUTION INTRAVENOUS at 12:47

## 2023-12-08 RX ADMIN — MAGNESIUM SULFATE HEPTAHYDRATE 1000 MG: 1 INJECTION, SOLUTION INTRAVENOUS at 10:17

## 2023-12-08 RX ADMIN — SODIUM CHLORIDE 1000 ML: 9 INJECTION, SOLUTION INTRAVENOUS at 10:13

## 2023-12-08 ASSESSMENT — ENCOUNTER SYMPTOMS
VOMITING: 0
TROUBLE SWALLOWING: 0
NAUSEA: 0
COLOR CHANGE: 0
ABDOMINAL PAIN: 0
COUGH: 1
SHORTNESS OF BREATH: 0
PHOTOPHOBIA: 0
DIARRHEA: 0

## 2023-12-08 ASSESSMENT — PAIN SCALES - GENERAL: PAINLEVEL_OUTOF10: 6

## 2023-12-08 ASSESSMENT — HEART SCORE: ECG: 0

## 2023-12-08 ASSESSMENT — PAIN - FUNCTIONAL ASSESSMENT: PAIN_FUNCTIONAL_ASSESSMENT: 0-10

## 2023-12-08 NOTE — ED PROVIDER NOTES
EMERGENCY DEPARTMENT ENCOUNTER    Pt Name: Luz Parker  MRN: [id number]  [address] [place] 1970  Date of evaluation: 12/8/23  CHIEF COMPLAINT       Chief Complaint   Patient presents with    Cough     HISTORY OF PRESENT ILLNESS   69-year-old male presenting to the ER complaining of a persistent cough that is producing phlegm as well as chest tightness. Patient does admit to an underlying history of type 2 diabetes as well as heart issues but denies any history of a heart attack. Patient states the chest tightness is stemming from his congestion. The history is provided by the patient. Cough  Cough characteristics:  Productive  Severity:  Moderate  Duration:  3 weeks  Timing:  Constant  Associated symptoms: chest pain (tightness)    Associated symptoms: no ear pain, no fever, no myalgias, no rash and no shortness of breath            REVIEW OF SYSTEMS     Review of Systems   Constitutional:  Negative for activity change, fatigue and fever. HENT:  Negative for congestion, ear pain and trouble swallowing. Eyes:  Negative for photophobia and visual disturbance. Respiratory:  Positive for cough. Negative for shortness of breath. Cardiovascular:  Positive for chest pain (tightness). Negative for palpitations. Gastrointestinal:  Negative for abdominal pain, diarrhea, nausea and vomiting. Genitourinary:  Negative for dysuria, flank pain and urgency. Musculoskeletal:  Negative for arthralgias and myalgias. Skin:  Negative for color change and rash. Neurological:  Negative for dizziness and facial asymmetry. Psychiatric/Behavioral:  Negative for agitation and behavioral problems.       PASTMEDICAL HISTORY     Past Medical History:   Diagnosis Date    Arthritis     GENERALIZED ALL OVER BODY    Class 2 obesity due to excess calories in adult 5/8/2023    Diabetes mellitus ([address]) 2006    NIDDM ON RX    Difficult intravenous access     ED (erectile dysfunction)     Gout 2009    ON RX    Hyperlipidemia

## 2023-12-11 LAB
EKG ATRIAL RATE: 113 BPM
EKG P AXIS: 33 DEGREES
EKG P-R INTERVAL: 156 MS
EKG Q-T INTERVAL: 336 MS
EKG QRS DURATION: 100 MS
EKG QTC CALCULATION (BAZETT): 460 MS
EKG R AXIS: -70 DEGREES
EKG T AXIS: 41 DEGREES
EKG VENTRICULAR RATE: 113 BPM

## (undated) DEVICE — RADIFOCUS GLIDEWIRE: Brand: GLIDEWIRE

## (undated) DEVICE — 3M™ STERI-STRIP™ COMPOUND BENZOIN TINCTURE 40 BAGS/CARTON 4 CARTONS/CASE C1544: Brand: 3M™ STERI-STRIP™

## (undated) DEVICE — GLOVE SURG SZ 8 L12IN FNGR THK79MIL GRN LTX FREE

## (undated) DEVICE — STRIP,CLOSURE,WOUND,MEDI-STRIP,1/2X4: Brand: MEDLINE

## (undated) DEVICE — PACK PROCEDURE SURG CYSTO SVMMC LF

## (undated) DEVICE — GOWN,SIRUS,POLYRNF,BRTHSLV,XL,30/CS: Brand: MEDLINE

## (undated) DEVICE — SINGLE ACTION PUMPING SYSTEM

## (undated) DEVICE — GARMENT,MEDLINE,DVT,INT,CALF,MED, GEN2: Brand: MEDLINE

## (undated) DEVICE — GLOVE ORANGE PI 7   MSG9070

## (undated) DEVICE — DUAL LUMEN URETERAL CATHETER

## (undated) DEVICE — STRAP,CATHETER,ELASTIC,HOOK&LOOP: Brand: MEDLINE

## (undated) DEVICE — GUIDEWIRE URO L150CM DIA0.035IN STIFF NIT HYDRPHLC STR TIP

## (undated) DEVICE — SOLUTION IV IRRIG WATER 1000ML POUR BRL 2F7114

## (undated) DEVICE — GLOVE SURG SZ 65 THK91MIL LTX FREE SYN POLYISOPRENE

## (undated) DEVICE — TUBING, SUCTION, 1/4" X 12', STRAIGHT: Brand: MEDLINE

## (undated) DEVICE — GLOVE ORANGE PI 7 1/2   MSG9075

## (undated) DEVICE — ELECTRODE ELECSURG NDL 2.8 INX7.2 CM COAT INSUL EDGE

## (undated) DEVICE — DRAINBAG,ANTI-REFLUX TOWER,L/F,2000ML,LL: Brand: MEDLINE

## (undated) DEVICE — DRAPE,REIN 53X77,STERILE: Brand: MEDLINE

## (undated) DEVICE — Device

## (undated) DEVICE — GLOVE ORANGE PI 8   MSG9080

## (undated) DEVICE — SYRINGE MED 20ML STD CLR PLAS LUERLOCK TIP N CTRL DISP

## (undated) DEVICE — GLOVE SURG SZ 75 L12IN FNGR THK87MIL WHT LTX FREE

## (undated) DEVICE — FIBER LSR DIA550UM HOLM SIDE FIRING DISP BPH DUOTOME

## (undated) DEVICE — ADAPTER URO SCP UROLOK LL

## (undated) DEVICE — GAUZE,SPONGE,FLUFF,6"X6.75",STRL,5/TRAY: Brand: MEDLINE

## (undated) DEVICE — FIBER LASER W/ 200 EXCALIBUR

## (undated) DEVICE — 100% SILICONE FOLEY CATHETER,5-10 ML, 2-WAY: Brand: DOVER

## (undated) DEVICE — GOWN,AURORA,NONRNF,XL,30/CS: Brand: MEDLINE

## (undated) DEVICE — GOWN,SIRUS,POLYRNF,BRTHSLV,2XL,18/CS: Brand: MEDLINE

## (undated) DEVICE — FIBER LSR DIA200UM FLEXSHIELD COAT HOLM HI PWR TRAC TIP

## (undated) DEVICE — MEDICINE CUP, GRADUATED, STER: Brand: MEDLINE

## (undated) DEVICE — GLOVE SURG SZ 7 L12IN FNGR THK87MIL WHT LTX FREE

## (undated) DEVICE — YANKAUER,FLEXIBLE HANDLE,REGLR CAPACITY: Brand: MEDLINE INDUSTRIES, INC.

## (undated) DEVICE — PLUG,CATHETER,DRAINAGE PROTECTOR,TUBE: Brand: MEDLINE